# Patient Record
Sex: FEMALE | Race: WHITE | URBAN - METROPOLITAN AREA
[De-identification: names, ages, dates, MRNs, and addresses within clinical notes are randomized per-mention and may not be internally consistent; named-entity substitution may affect disease eponyms.]

---

## 2017-05-06 ENCOUNTER — INPATIENT (INPATIENT)
Facility: HOSPITAL | Age: 82
LOS: 4 days | Discharge: HOME HEALTH | End: 2017-05-11
Attending: FAMILY MEDICINE | Admitting: INTERNAL MEDICINE
Payer: MEDICARE

## 2017-05-06 VITALS — WEIGHT: 89.95 LBS

## 2017-05-06 DIAGNOSIS — J15.6 PNEUMONIA DUE TO OTHER GRAM-NEGATIVE BACTERIA: ICD-10-CM

## 2017-05-06 DIAGNOSIS — S62.102A FRACTURE OF UNSPECIFIED CARPAL BONE, LEFT WRIST, INITIAL ENCOUNTER FOR CLOSED FRACTURE: Chronic | ICD-10-CM

## 2017-05-06 DIAGNOSIS — S72.91XA UNSPECIFIED FRACTURE OF RIGHT FEMUR, INITIAL ENCOUNTER FOR CLOSED FRACTURE: Chronic | ICD-10-CM

## 2017-05-06 LAB
ALBUMIN SERPL ELPH-MCNC: 3.1 G/DL — LOW (ref 3.3–5)
ALP SERPL-CCNC: 135 U/L — HIGH (ref 40–120)
ALT FLD-CCNC: 34 U/L — SIGNIFICANT CHANGE UP (ref 12–78)
ANION GAP SERPL CALC-SCNC: 14 MMOL/L — SIGNIFICANT CHANGE UP (ref 5–17)
AST SERPL-CCNC: 23 U/L — SIGNIFICANT CHANGE UP (ref 15–37)
BASOPHILS # BLD AUTO: SIGNIFICANT CHANGE UP K/UL (ref 0–0.2)
BILIRUB SERPL-MCNC: 0.6 MG/DL — SIGNIFICANT CHANGE UP (ref 0.2–1.2)
BUN SERPL-MCNC: 16 MG/DL — SIGNIFICANT CHANGE UP (ref 7–23)
CALCIUM SERPL-MCNC: 10.2 MG/DL — HIGH (ref 8.5–10.1)
CHLORIDE SERPL-SCNC: 102 MMOL/L — SIGNIFICANT CHANGE UP (ref 96–108)
CO2 SERPL-SCNC: 27 MMOL/L — SIGNIFICANT CHANGE UP (ref 22–31)
CREAT SERPL-MCNC: 0.5 MG/DL — SIGNIFICANT CHANGE UP (ref 0.5–1.3)
EOSINOPHIL # BLD AUTO: SIGNIFICANT CHANGE UP K/UL (ref 0–0.5)
GLUCOSE SERPL-MCNC: 111 MG/DL — HIGH (ref 70–99)
HCT VFR BLD CALC: 45.5 % — HIGH (ref 34.5–45)
HGB BLD-MCNC: 14.8 G/DL — SIGNIFICANT CHANGE UP (ref 11.5–15.5)
LACTATE SERPL-SCNC: 1.5 MMOL/L — SIGNIFICANT CHANGE UP (ref 0.7–2)
LYMPHOCYTES # BLD AUTO: 11 % — LOW (ref 13–44)
LYMPHOCYTES # BLD AUTO: SIGNIFICANT CHANGE UP K/UL (ref 1–3.3)
MANUAL DIF COMMENT BLD-IMP: SIGNIFICANT CHANGE UP
MCHC RBC-ENTMCNC: 29 PG — SIGNIFICANT CHANGE UP (ref 27–34)
MCHC RBC-ENTMCNC: 32.5 GM/DL — SIGNIFICANT CHANGE UP (ref 32–36)
MCV RBC AUTO: 89.3 FL — SIGNIFICANT CHANGE UP (ref 80–100)
MONOCYTES # BLD AUTO: HIGH K/UL (ref 0–0.9)
MONOCYTES NFR BLD AUTO: 20 % — HIGH (ref 2–14)
NEUTROPHILS # BLD AUTO: HIGH K/UL (ref 1.8–7.4)
NEUTROPHILS NFR BLD AUTO: 61 % — SIGNIFICANT CHANGE UP (ref 43–77)
NEUTS BAND # BLD: 8 % — SIGNIFICANT CHANGE UP (ref 0–8)
PLAT MORPH BLD: NORMAL — SIGNIFICANT CHANGE UP
PLATELET # BLD AUTO: 366 K/UL — SIGNIFICANT CHANGE UP (ref 150–400)
POTASSIUM SERPL-MCNC: 3.4 MMOL/L — LOW (ref 3.5–5.3)
POTASSIUM SERPL-SCNC: 3.4 MMOL/L — LOW (ref 3.5–5.3)
PROT SERPL-MCNC: 7.9 GM/DL — SIGNIFICANT CHANGE UP (ref 6–8.3)
RAPID RVP RESULT: SIGNIFICANT CHANGE UP
RBC # BLD: 5.1 M/UL — SIGNIFICANT CHANGE UP (ref 3.8–5.2)
RBC # FLD: 12.5 % — SIGNIFICANT CHANGE UP (ref 10.3–14.5)
RBC BLD AUTO: NORMAL — SIGNIFICANT CHANGE UP
SODIUM SERPL-SCNC: 143 MMOL/L — SIGNIFICANT CHANGE UP (ref 135–145)
TROPONIN I SERPL-MCNC: 0.31 NG/ML — HIGH (ref 0.01–0.04)
TROPONIN I SERPL-MCNC: 0.38 NG/ML — HIGH (ref 0.01–0.04)
WBC # BLD: 10.4 K/UL — SIGNIFICANT CHANGE UP (ref 3.8–10.5)
WBC # FLD AUTO: 10.4 K/UL — SIGNIFICANT CHANGE UP (ref 3.8–10.5)

## 2017-05-06 PROCEDURE — 99285 EMERGENCY DEPT VISIT HI MDM: CPT

## 2017-05-06 PROCEDURE — 71010: CPT | Mod: 26

## 2017-05-06 RX ORDER — POTASSIUM CHLORIDE 20 MEQ
40 PACKET (EA) ORAL ONCE
Qty: 0 | Refills: 0 | Status: COMPLETED | OUTPATIENT
Start: 2017-05-06 | End: 2017-05-06

## 2017-05-06 RX ORDER — ASPIRIN/CALCIUM CARB/MAGNESIUM 324 MG
325 TABLET ORAL ONCE
Qty: 0 | Refills: 0 | Status: COMPLETED | OUTPATIENT
Start: 2017-05-06 | End: 2017-05-06

## 2017-05-06 RX ORDER — FUROSEMIDE 40 MG
40 TABLET ORAL DAILY
Qty: 0 | Refills: 0 | Status: DISCONTINUED | OUTPATIENT
Start: 2017-05-06 | End: 2017-05-07

## 2017-05-06 RX ORDER — ALBUTEROL 90 UG/1
2 AEROSOL, METERED ORAL EVERY 6 HOURS
Qty: 0 | Refills: 0 | Status: DISCONTINUED | OUTPATIENT
Start: 2017-05-06 | End: 2017-05-09

## 2017-05-06 RX ORDER — METOPROLOL TARTRATE 50 MG
12.5 TABLET ORAL EVERY 12 HOURS
Qty: 0 | Refills: 0 | Status: DISCONTINUED | OUTPATIENT
Start: 2017-05-06 | End: 2017-05-08

## 2017-05-06 RX ORDER — CEFTRIAXONE 500 MG/1
1 INJECTION, POWDER, FOR SOLUTION INTRAMUSCULAR; INTRAVENOUS ONCE
Qty: 0 | Refills: 0 | Status: COMPLETED | OUTPATIENT
Start: 2017-05-06 | End: 2017-05-06

## 2017-05-06 RX ORDER — ASPIRIN/CALCIUM CARB/MAGNESIUM 324 MG
81 TABLET ORAL DAILY
Qty: 0 | Refills: 0 | Status: DISCONTINUED | OUTPATIENT
Start: 2017-05-06 | End: 2017-05-11

## 2017-05-06 RX ORDER — FAMOTIDINE 10 MG/ML
20 INJECTION INTRAVENOUS AT BEDTIME
Qty: 0 | Refills: 0 | Status: DISCONTINUED | OUTPATIENT
Start: 2017-05-06 | End: 2017-05-11

## 2017-05-06 RX ORDER — DOCUSATE SODIUM 100 MG
100 CAPSULE ORAL THREE TIMES A DAY
Qty: 0 | Refills: 0 | Status: DISCONTINUED | OUTPATIENT
Start: 2017-05-06 | End: 2017-05-11

## 2017-05-06 RX ORDER — ONDANSETRON 8 MG/1
4 TABLET, FILM COATED ORAL EVERY 6 HOURS
Qty: 0 | Refills: 0 | Status: DISCONTINUED | OUTPATIENT
Start: 2017-05-06 | End: 2017-05-11

## 2017-05-06 RX ORDER — ZOLPIDEM TARTRATE 10 MG/1
5 TABLET ORAL AT BEDTIME
Qty: 0 | Refills: 0 | Status: DISCONTINUED | OUTPATIENT
Start: 2017-05-06 | End: 2017-05-11

## 2017-05-06 RX ORDER — ALBUTEROL 90 UG/1
2 AEROSOL, METERED ORAL
Qty: 0 | Refills: 0 | COMMUNITY

## 2017-05-06 RX ORDER — BUDESONIDE AND FORMOTEROL FUMARATE DIHYDRATE 160; 4.5 UG/1; UG/1
2 AEROSOL RESPIRATORY (INHALATION)
Qty: 0 | Refills: 0 | Status: DISCONTINUED | OUTPATIENT
Start: 2017-05-06 | End: 2017-05-11

## 2017-05-06 RX ORDER — ENOXAPARIN SODIUM 100 MG/ML
40 INJECTION SUBCUTANEOUS EVERY 24 HOURS
Qty: 0 | Refills: 0 | Status: DISCONTINUED | OUTPATIENT
Start: 2017-05-06 | End: 2017-05-11

## 2017-05-06 RX ORDER — SODIUM CHLORIDE 9 MG/ML
1500 INJECTION INTRAMUSCULAR; INTRAVENOUS; SUBCUTANEOUS ONCE
Qty: 0 | Refills: 0 | Status: COMPLETED | OUTPATIENT
Start: 2017-05-06 | End: 2017-05-06

## 2017-05-06 RX ORDER — NITROGLYCERIN 6.5 MG
0.5 CAPSULE, EXTENDED RELEASE ORAL ONCE
Qty: 0 | Refills: 0 | Status: COMPLETED | OUTPATIENT
Start: 2017-05-06 | End: 2017-05-06

## 2017-05-06 RX ORDER — ALBUTEROL 90 UG/1
2.5 AEROSOL, METERED ORAL ONCE
Qty: 0 | Refills: 0 | Status: COMPLETED | OUTPATIENT
Start: 2017-05-06 | End: 2017-05-06

## 2017-05-06 RX ADMIN — Medication 40 MILLIGRAM(S): at 13:46

## 2017-05-06 RX ADMIN — Medication 12.5 MILLIGRAM(S): at 18:44

## 2017-05-06 RX ADMIN — Medication 325 MILLIGRAM(S): at 13:34

## 2017-05-06 RX ADMIN — ENOXAPARIN SODIUM 40 MILLIGRAM(S): 100 INJECTION SUBCUTANEOUS at 16:28

## 2017-05-06 RX ADMIN — BUDESONIDE AND FORMOTEROL FUMARATE DIHYDRATE 2 PUFF(S): 160; 4.5 AEROSOL RESPIRATORY (INHALATION) at 20:48

## 2017-05-06 RX ADMIN — Medication 100 MILLIGRAM(S): at 21:24

## 2017-05-06 RX ADMIN — SODIUM CHLORIDE 1500 MILLILITER(S): 9 INJECTION INTRAMUSCULAR; INTRAVENOUS; SUBCUTANEOUS at 12:38

## 2017-05-06 RX ADMIN — FAMOTIDINE 20 MILLIGRAM(S): 10 INJECTION INTRAVENOUS at 21:24

## 2017-05-06 RX ADMIN — Medication 0.5 INCH(S): at 14:24

## 2017-05-06 RX ADMIN — CEFTRIAXONE 100 GRAM(S): 500 INJECTION, POWDER, FOR SOLUTION INTRAMUSCULAR; INTRAVENOUS at 13:07

## 2017-05-06 RX ADMIN — ALBUTEROL 2.5 MILLIGRAM(S): 90 AEROSOL, METERED ORAL at 12:58

## 2017-05-06 RX ADMIN — Medication 40 MILLIEQUIVALENT(S): at 16:01

## 2017-05-06 RX ADMIN — ZOLPIDEM TARTRATE 5 MILLIGRAM(S): 10 TABLET ORAL at 22:38

## 2017-05-06 NOTE — H&P ADULT - ASSESSMENT
83 year old woman with PMHx of asthma who presents with worsening dyspnea on exertion    SOB/MARTIN secondary to probable acute CHF exacerbation with troponemia/NSTEMI  -r/o ACS, trend troponins.  Denies chest pain or discomfort.  -xray --> mild congestion. Await official read  -tele shows sinus tach w/ frequent PVCs  -elevated BNP  -nitro patch in place  -start metoprolol  -lipid panel  -aspirin  -hold off on heparin gtt at this time  -lasix therapy  -f/u echo  -cardio consult for further evaluation    Asthma: No wheezing, appears stable.  -RVP negative  -dulera  -fluticasone  -nebs    Chronic  back pain:  -fentanyl 50mcg patch    GERD:  -ranitidine 150mg    Dvt ppx:  -lovenox

## 2017-05-06 NOTE — H&P ADULT - HISTORY OF PRESENT ILLNESS
83 year old woman with PMHx of asthma, chronic back pain, who presents with worsening dyspnea on exertion for past 5 days or so.  Breathing worse with ambulation.  SOB associated with generalized weakness.  Per son she is normally very active but has been lacking energy last several days.  Pt recently treated for URI with zithromax and prednisone.     in ER: Given lasix, albuterol, aspirin 325mg and ceftriaxone for possible CHF exacerbation.

## 2017-05-06 NOTE — ED PROVIDER NOTE - NS ED MD SCRIBE ATTENDING SCRIBE SECTIONS
REVIEW OF SYSTEMS/RESULTS/PAST MEDICAL/SURGICAL/SOCIAL HISTORY/DISPOSITION/PROGRESS NOTE/PHYSICAL EXAM/HISTORY OF PRESENT ILLNESS

## 2017-05-06 NOTE — ED PROVIDER NOTE - CARE PLAN
Principal Discharge DX:	Acute congestive heart failure, unspecified congestive heart failure type  Secondary Diagnosis:	Troponin I above reference range

## 2017-05-06 NOTE — H&P ADULT - NSHPPHYSICALEXAM_GEN_ALL_CORE
PHYSICAL EXAM:  Vital Signs Last 24 Hrs  T(C): 36.8, Max: 37.6 (05-06 @ 12:49)  T(F): 98.2, Max: 99.7 (05-06 @ 12:49)  HR: 109 (101 - 120)  BP: 131/93 (116/76 - 153/86)  BP(mean): --  RR: 21 (18 - 22)  SpO2: 99% (92% - 100%)    Constitutional: NAD, awake and alert, well-developed  HEENT: PERR, EOMI, Normal Hearing, MMM  Neck: Soft and supple  Respiratory: Breath sounds are decreased but clear bilaterally, No wheezing, rales or rhonchi  Cardiovascular: S1 and S2, regular rate and rhythm, no Murmurs, gallops or rubs  Gastrointestinal: Bowel Sounds present, soft, nontender, nondistended, no guarding, no rebound  Extremities: No peripheral edema  Neurological: A/O x 3, no focal deficits  Skin: No rashes

## 2017-05-06 NOTE — ED ADULT NURSE REASSESSMENT NOTE - NS ED NURSE REASSESS COMMENT FT1
pt urinated moderate amount in bedpan, unable to measure before discarded by techs.
pt reports her breathing has improved. resp even, labored at times, pt able to talk in complete sentences with no difficulty. assisted with using bedpan multiple times to void s/p lasix iv. pt assessed by hospitalist at bedside, awaiting inpatient orders.

## 2017-05-06 NOTE — ED PROVIDER NOTE - CARDIAC, MLM
Normal rate, regular rhythm.  Heart sounds S1, S2.  No murmurs, rubs or gallops. Normal rate, regular rhythm.  Heart sounds S1, S2.  tachycardic

## 2017-05-06 NOTE — ED PROVIDER NOTE - OBJECTIVE STATEMENT
82 y/o female with hx of asthma, IBS presents to the ED c/o cough and SOB x 5 days. She also notes chest pressure with deep breaths.  Notes mild productive cough, unable to bring anything up. Son notes weakness. Had a URI recently and was tx with z-pack and prednisone from PMD. Pt is a former smoker. No hx of PNA, MI, CHF.  No recent hospitalizations. Currently pt has no other complaints and denies fever and abd pain. PMD=[place].

## 2017-05-06 NOTE — PATIENT PROFILE ADULT. - VISION (WITH CORRECTIVE LENSES IF THE PATIENT USUALLY WEARS THEM):
Partially impaired: cannot see medication labels or newsprint, but can see obstacles in path, and the surrounding layout; can count fingers at arm's length/reading glasses/ glaucoma

## 2017-05-06 NOTE — ED STATDOCS - NS ED MD SCRIBE ATTENDING SCRIBE SECTIONS
REVIEW OF SYSTEMS/RESULTS/PROGRESS NOTE/PHYSICAL EXAM/HISTORY OF PRESENT ILLNESS/PAST MEDICAL/SURGICAL/SOCIAL HISTORY/VITAL SIGNS( Pullset)/DISPOSITION

## 2017-05-06 NOTE — H&P ADULT - NSHPLABSRESULTS_GEN_ALL_CORE
CARDIAC MARKERS ( 06 May 2017 12:42 )  0.415 ng/mL / x     / x     / x     / x                                14.8   10.4  )-----------( 366      ( 06 May 2017 12:42 )             45.5     05-06    143  |  102  |  16  ----------------------------<  111<H>  3.4<L>   |  27  |  0.50    Ca    10.2<H>      06 May 2017 12:42    TPro  7.9  /  Alb  3.1<L>  /  TBili  0.6  /  DBili  x   /  AST  23  /  ALT  34  /  AlkPhos  135<H>  05-06    CAPILLARY BLOOD GLUCOSE    LIVER FUNCTIONS - ( 06 May 2017 12:42 )  Alb: 3.1 g/dL / Pro: 7.9 gm/dL / ALK PHOS: 135 U/L / ALT: 34 U/L / AST: 23 U/L / GGT: x

## 2017-05-06 NOTE — ED ADULT NURSE NOTE - OBJECTIVE STATEMENT
pt states trouble breathing, worsening since last monday, trouble ambulating now due to breathing difficulty.

## 2017-05-06 NOTE — ED STATDOCS - OBJECTIVE STATEMENT
84 y/o female with PMHx of asthma, IBS presents to the ED c/o cough starting on Sunday. +difficulty breathing and chest pressure. Denies fevers. Notes mild productive cough, unable to bring anything up. Son notes weakness. Had a URI recently and was tx with z-pack, prednisone. No hx of PNA, MI, CHF. No recent hospitalizations.

## 2017-05-06 NOTE — H&P ADULT - PSH
Bakers cyst     delivery delivered  x 2  Closed fracture of right femur, unspecified fracture morphology, unspecified portion of femur, initial encounter    Fracture of left wrist, closed, initial encounter  s/p repair x 2  History of back surgery    S/P cataract surgery  left

## 2017-05-06 NOTE — ED PROVIDER NOTE - DETAILS:
The history, relevant review of systems, past medical and surgical history, medical decision making, and physical examination was documented by the scribe in my presence and I attest to the accuracy of the documentation (Leticia Macias MD).

## 2017-05-06 NOTE — ED PROVIDER NOTE - PROGRESS NOTE DETAILS
Suad Butterfield: Given pt's BMP and CXR, pt probably with CHF, will stop fluids at this time. Attending jelly Macias/ivy Ventura for admission.

## 2017-05-06 NOTE — ED PROVIDER NOTE - CONSTITUTIONAL, MLM
normal... Well appearing, well nourished, awake, alert, oriented to person, place, time/situation and in no apparent distress. awake, alert, oriented to person, place, time/situation and in mild distress due to SOB.

## 2017-05-07 DIAGNOSIS — R06.02 SHORTNESS OF BREATH: ICD-10-CM

## 2017-05-07 DIAGNOSIS — I50.9 HEART FAILURE, UNSPECIFIED: ICD-10-CM

## 2017-05-07 DIAGNOSIS — R74.8 ABNORMAL LEVELS OF OTHER SERUM ENZYMES: ICD-10-CM

## 2017-05-07 DIAGNOSIS — J45.909 UNSPECIFIED ASTHMA, UNCOMPLICATED: ICD-10-CM

## 2017-05-07 LAB
ADD ON TEST-SPECIMEN IN LAB: SIGNIFICANT CHANGE UP
ALBUMIN SERPL ELPH-MCNC: 2.6 G/DL — LOW (ref 3.3–5)
ALP SERPL-CCNC: 107 U/L — SIGNIFICANT CHANGE UP (ref 40–120)
ALT FLD-CCNC: 29 U/L — SIGNIFICANT CHANGE UP (ref 12–78)
ANION GAP SERPL CALC-SCNC: 9 MMOL/L — SIGNIFICANT CHANGE UP (ref 5–17)
APPEARANCE UR: CLEAR — SIGNIFICANT CHANGE UP
AST SERPL-CCNC: 20 U/L — SIGNIFICANT CHANGE UP (ref 15–37)
BACTERIA # UR AUTO: (no result)
BILIRUB SERPL-MCNC: 0.4 MG/DL — SIGNIFICANT CHANGE UP (ref 0.2–1.2)
BILIRUB UR-MCNC: NEGATIVE — SIGNIFICANT CHANGE UP
BUN SERPL-MCNC: 20 MG/DL — SIGNIFICANT CHANGE UP (ref 7–23)
CALCIUM SERPL-MCNC: 9.4 MG/DL — SIGNIFICANT CHANGE UP (ref 8.5–10.1)
CHLORIDE SERPL-SCNC: 106 MMOL/L — SIGNIFICANT CHANGE UP (ref 96–108)
CHOLEST SERPL-MCNC: 165 MG/DL — SIGNIFICANT CHANGE UP (ref 10–199)
CO2 SERPL-SCNC: 31 MMOL/L — SIGNIFICANT CHANGE UP (ref 22–31)
COLOR SPEC: YELLOW — SIGNIFICANT CHANGE UP
CREAT SERPL-MCNC: 0.5 MG/DL — SIGNIFICANT CHANGE UP (ref 0.5–1.3)
DIFF PNL FLD: (no result)
EPI CELLS # UR: SIGNIFICANT CHANGE UP
GLUCOSE SERPL-MCNC: 84 MG/DL — SIGNIFICANT CHANGE UP (ref 70–99)
GLUCOSE UR QL: NEGATIVE MG/DL — SIGNIFICANT CHANGE UP
GRAN CASTS # UR COMP ASSIST: (no result) /LPF
HBA1C BLD-MCNC: 5.6 % — SIGNIFICANT CHANGE UP (ref 4–5.6)
HCT VFR BLD CALC: 38.3 % — SIGNIFICANT CHANGE UP (ref 34.5–45)
HDLC SERPL-MCNC: 57 MG/DL — SIGNIFICANT CHANGE UP (ref 40–125)
HGB BLD-MCNC: 12.9 G/DL — SIGNIFICANT CHANGE UP (ref 11.5–15.5)
HYALINE CASTS # UR AUTO: (no result) /LPF
KETONES UR-MCNC: (no result)
LEUKOCYTE ESTERASE UR-ACNC: NEGATIVE — SIGNIFICANT CHANGE UP
LIPID PNL WITH DIRECT LDL SERPL: 91 MG/DL — SIGNIFICANT CHANGE UP
MAGNESIUM SERPL-MCNC: 2.1 MG/DL — SIGNIFICANT CHANGE UP (ref 1.8–2.4)
MCHC RBC-ENTMCNC: 30.2 PG — SIGNIFICANT CHANGE UP (ref 27–34)
MCHC RBC-ENTMCNC: 33.6 GM/DL — SIGNIFICANT CHANGE UP (ref 32–36)
MCV RBC AUTO: 90.1 FL — SIGNIFICANT CHANGE UP (ref 80–100)
NITRITE UR-MCNC: NEGATIVE — SIGNIFICANT CHANGE UP
NT-PROBNP SERPL-SCNC: 2190 PG/ML — HIGH (ref 0–450)
PH UR: 5 — SIGNIFICANT CHANGE UP (ref 5–8)
PHOSPHATE SERPL-MCNC: 3.1 MG/DL — SIGNIFICANT CHANGE UP (ref 2.5–4.5)
PLATELET # BLD AUTO: 308 K/UL — SIGNIFICANT CHANGE UP (ref 150–400)
POTASSIUM SERPL-MCNC: 3.3 MMOL/L — LOW (ref 3.5–5.3)
POTASSIUM SERPL-SCNC: 3.3 MMOL/L — LOW (ref 3.5–5.3)
PROT SERPL-MCNC: 6.7 GM/DL — SIGNIFICANT CHANGE UP (ref 6–8.3)
PROT UR-MCNC: 30 MG/DL
RBC # BLD: 4.26 M/UL — SIGNIFICANT CHANGE UP (ref 3.8–5.2)
RBC # FLD: 12.4 % — SIGNIFICANT CHANGE UP (ref 10.3–14.5)
RBC CASTS # UR COMP ASSIST: (no result) /HPF (ref 0–4)
SODIUM SERPL-SCNC: 146 MMOL/L — HIGH (ref 135–145)
SP GR SPEC: 1.01 — SIGNIFICANT CHANGE UP (ref 1.01–1.02)
T4 AB SER-ACNC: 7.8 UG/DL — SIGNIFICANT CHANGE UP (ref 4.6–12)
T4 FREE SERPL-MCNC: 1.42 NG/DL — SIGNIFICANT CHANGE UP (ref 0.76–1.46)
TOTAL CHOLESTEROL/HDL RATIO MEASUREMENT: 3 RATIO — LOW (ref 3.3–7.1)
TRIGL SERPL-MCNC: 87 MG/DL — SIGNIFICANT CHANGE UP (ref 10–149)
TSH SERPL-MCNC: 0.24 UIU/ML — LOW (ref 0.36–3.74)
UROBILINOGEN FLD QL: NEGATIVE MG/DL — SIGNIFICANT CHANGE UP
WBC # BLD: 9.5 K/UL — SIGNIFICANT CHANGE UP (ref 3.8–10.5)
WBC # FLD AUTO: 9.5 K/UL — SIGNIFICANT CHANGE UP (ref 3.8–10.5)
WBC UR QL: SIGNIFICANT CHANGE UP

## 2017-05-07 PROCEDURE — 93010 ELECTROCARDIOGRAM REPORT: CPT

## 2017-05-07 PROCEDURE — 99223 1ST HOSP IP/OBS HIGH 75: CPT

## 2017-05-07 RX ORDER — CLOPIDOGREL BISULFATE 75 MG/1
75 TABLET, FILM COATED ORAL DAILY
Qty: 0 | Refills: 0 | Status: DISCONTINUED | OUTPATIENT
Start: 2017-05-07 | End: 2017-05-09

## 2017-05-07 RX ORDER — FUROSEMIDE 40 MG
20 TABLET ORAL DAILY
Qty: 0 | Refills: 0 | Status: DISCONTINUED | OUTPATIENT
Start: 2017-05-07 | End: 2017-05-07

## 2017-05-07 RX ORDER — POTASSIUM CHLORIDE 20 MEQ
40 PACKET (EA) ORAL ONCE
Qty: 0 | Refills: 0 | Status: COMPLETED | OUTPATIENT
Start: 2017-05-07 | End: 2017-05-07

## 2017-05-07 RX ORDER — FENTANYL CITRATE 50 UG/ML
1 INJECTION INTRAVENOUS
Qty: 0 | Refills: 0 | Status: DISCONTINUED | OUTPATIENT
Start: 2017-05-07 | End: 2017-05-11

## 2017-05-07 RX ADMIN — Medication 100 MILLIGRAM(S): at 05:37

## 2017-05-07 RX ADMIN — Medication 81 MILLIGRAM(S): at 09:32

## 2017-05-07 RX ADMIN — Medication 12.5 MILLIGRAM(S): at 16:21

## 2017-05-07 RX ADMIN — FAMOTIDINE 20 MILLIGRAM(S): 10 INJECTION INTRAVENOUS at 21:12

## 2017-05-07 RX ADMIN — Medication 12.5 MILLIGRAM(S): at 05:38

## 2017-05-07 RX ADMIN — Medication 40 MILLIGRAM(S): at 05:39

## 2017-05-07 RX ADMIN — BUDESONIDE AND FORMOTEROL FUMARATE DIHYDRATE 2 PUFF(S): 160; 4.5 AEROSOL RESPIRATORY (INHALATION) at 10:11

## 2017-05-07 RX ADMIN — Medication 40 MILLIEQUIVALENT(S): at 09:32

## 2017-05-07 RX ADMIN — Medication 100 MILLIGRAM(S): at 21:11

## 2017-05-07 RX ADMIN — BUDESONIDE AND FORMOTEROL FUMARATE DIHYDRATE 2 PUFF(S): 160; 4.5 AEROSOL RESPIRATORY (INHALATION) at 20:33

## 2017-05-07 RX ADMIN — ENOXAPARIN SODIUM 40 MILLIGRAM(S): 100 INJECTION SUBCUTANEOUS at 16:21

## 2017-05-07 RX ADMIN — FENTANYL CITRATE 1 PATCH: 50 INJECTION INTRAVENOUS at 11:19

## 2017-05-07 RX ADMIN — Medication 0.5 INCH(S): at 05:49

## 2017-05-07 RX ADMIN — CLOPIDOGREL BISULFATE 75 MILLIGRAM(S): 75 TABLET, FILM COATED ORAL at 11:19

## 2017-05-07 RX ADMIN — ZOLPIDEM TARTRATE 5 MILLIGRAM(S): 10 TABLET ORAL at 21:10

## 2017-05-07 NOTE — CONSULT NOTE ADULT - SUBJECTIVE AND OBJECTIVE BOX
C    HPI:  83 year old woman with PMHx of asthma, chronic back pain, who presents with worsening dyspnea on exertion for past one week   Patient was feeling weak for few days with subsequent cough with  scanty productive cough , with chest heavyness , increase with activity , occasional wheezing , patient did receive bronchodilator treatment , with antibiotics , however she continue feel weak , decided to come to ER   . Pt recently treated for URI with zithromax and prednisone.     in ER: Given lasix, albuterol, aspirin 325mg and ceftriaxone for possible  pneumonia  (06 May 2017 15:11)  Patient blood work showed mild elevated troponin  with new EKg changes     patient says she had  negative cardiac work up 2 years       PAST MEDICAL & SURGICAL HISTORY:  IBS (irritable bowel syndrome)  Chronic back pain  Asthma  Fracture of left wrist, closed, initial encounter: s/p repair x 2  Closed fracture of right femur, unspecified fracture morphology, unspecified portion of femur, initial encounter   delivery delivered: x 2  Bakers cyst  S/P cataract surgery: left  History of back surgery      Allergies    &quot;almost &quot; from MRI dye (Other)  No Known Drug Allergies    Intolerances    oxycodone (Nausea)      SOCIAL HISTORY: never smoker     FAMILY HISTORY:  No pertinent family history in first degree relatives      MEDICATIONS:  MEDICATIONS  (STANDING):  furosemide   Injectable 40milliGRAM(s) IV Push daily  buDESOnide 160 MICROgram(s)/formoterol 4.5 MICROgram(s) Inhaler 2Puff(s) Inhalation two times a day  enoxaparin Injectable 40milliGRAM(s) SubCutaneous every 24 hours  metoprolol 12.5milliGRAM(s) Oral every 12 hours  docusate sodium 100milliGRAM(s) Oral three times a day  aspirin  chewable 81milliGRAM(s) Oral daily  famotidine    Tablet 20milliGRAM(s) Oral at bedtime  potassium chloride    Tablet ER 40milliEquivalent(s) Oral once    MEDICATIONS  (PRN):  ondansetron Injectable 4milliGRAM(s) IV Push every 6 hours PRN Nausea  zolpidem 5milliGRAM(s) Oral at bedtime PRN Insomnia  ALBUTerol    90 MICROgram(s) HFA Inhaler 2Puff(s) Inhalation every 6 hours PRN Bronchospasm      REVIEW OF SYSTEMS:     no fever   All other review of systems is negative unless indicated above    Vital Signs Last 24 Hrs  T(C): 36.4, Max: 37.6 ( @ 12:49)  T(F): 97.6, Max: 99.7 ( @ 12:49)  HR: 87 (72 - 120)  BP: 120/70 (102/76 - 153/86)  BP(mean): --  RR: 17 (17 - 22)  SpO2: 98% (92% - 100%)    I&O's Summary    I & Os for current day (as of 07 May 2017 09:24)  =============================================  IN: 1200 ml / OUT: 575 ml / NET: 625 ml      PHYSICAL EXAM:    Constitutional: NAD, awake and alert, thin built   HEENT: PERR, EOMI,  No oral cyananosis.  Neck:  supple,  No JVD  Respiratory: Breath sounds are clear bilaterally,scattered  rhonchi  Cardiovascular: S1 and S2, regular rate and rhythm,with ectopic beats  Gastrointestinal: Bowel Sounds present, soft, nontender.   Extremities: No peripheral edema. No clubbing or cyanosis.  Vascular: 2+ peripheral pulses  Neurological: A/O x 3, no focal deficits  Musculoskeletal: no calf tenderness.  Skin: No rashes.      LABS: All Labs Reviewed:                        12.9   9.5   )-----------( 308      ( 07 May 2017 05:28 )             38.3                         14.8   10.4  )-----------( 366      ( 06 May 2017 12:42 )             45.5     07 May 2017 05:28    146    |  106    |  20     ----------------------------<  84     3.3     |  31     |  0.50   06 May 2017 12:42    143    |  102    |  16     ----------------------------<  111    3.4     |  27     |  0.50     Ca    9.4        07 May 2017 05:28  Ca    10.2       06 May 2017 12:42  Phos  3.1       07 May 2017 05:28  Mg     2.1       07 May 2017 05:28    TPro  6.7    /  Alb  2.6    /  TBili  0.4    /  DBili  x      /  AST  20     /  ALT  29     /  AlkPhos  107    07 May 2017 05:28  TPro  7.9    /  Alb  3.1    /  TBili  0.6    /  DBili  x      /  AST  23     /  ALT  34     /  AlkPhos  135    06 May 2017 12:42      CARDIAC MARKERS ( 06 May 2017 19:42 )  0.312 ng/mL / x     / x     / x     / x      CARDIAC MARKERS ( 06 May 2017 16:25 )  0.384 ng/mL / x     / x     / x     / x      CARDIAC MARKERS ( 06 May 2017 12:42 )  0.415 ng/mL / x     / x     / x     / x          Blood Culture:    @ 05:28  Pro Bnp 2190   @ 12:42  Pro Bnp 1377     @ 05:28  TSH: 0.243      RADIOLOGY/EK2017  Sinus tachycardia  frequent PVC  left axis  ICRBBB,  2017  Sinus rhythm  New T wave inversion inferior leads , anterior leads ,

## 2017-05-07 NOTE — CONSULT NOTE ADULT - PROBLEM SELECTOR RECOMMENDATION 4
elevated troponin with eg changes ,  will continue ecotrin , will give plavix ,  recommend cardiac cath , will evaluate after echo

## 2017-05-07 NOTE — DIETITIAN INITIAL EVALUATION ADULT. - NS AS NUTRI DX NUTRIENT
Patient meets criteria for severe protein energy malnutrition due to <75% intake of nutritional needs >7 days, and NFPE reveals severe clavicle, interosseous, biceps, quadriceps muscle wasting and severe orbital  fat loss, visible squaring of shoulders and protruding acromion process/Malnutrition

## 2017-05-07 NOTE — CONSULT NOTE ADULT - PROBLEM SELECTOR RECOMMENDATION 3
possible ACS as patient elevated troponin with New EKG changes   patient might need coronary angiogram.  left message with son

## 2017-05-07 NOTE — DIETITIAN INITIAL EVALUATION ADULT. - PHYSICAL APPEARANCE
NFPE reveals severe clavicle, interosseous, biceps, quadriceps muscle wasting and severe orbital  fat loss, visible squaring of shoulders and protruding acromion process./underweight

## 2017-05-07 NOTE — PHYSICAL THERAPY INITIAL EVALUATION ADULT - DIAGNOSIS, PT EVAL
SOB/MARTIN secondary to probable acute CHF exacerbation with troponemia/NSTEMI vs less likely asthma exacerbation

## 2017-05-07 NOTE — CHART NOTE - NSCHARTNOTEFT_GEN_A_CORE
Upon Nutritional Assessment by the Registered Dietitian your patient was determined to meet criteria has evidence of the following diagnosis/diagnoses:        [ ]  Mild Protein Calorie Malnutrition        [ ]  Moderate Protein Calorie Malnutrition        [X] Severe Protein Calorie Malnutrition        [ ] Unspecified Protein Calorie Malnutrition    Findings as based on:  •  Comprehensive nutrition assessment and Nutrition focused physical examination  •  Insufficient food/energy intake  •  Weight loss over time(Please specify time period)  •  Loss of muscle mass  •  Loss of fat mass  •  Fluid accumulation    Findings relevant to patient:  1.<75% intake of nutritional needs >7 days,   2.  severe clavicle, interosseous, biceps, quadriceps muscle wasting   3 severe orbital  fat loss, visible squaring of shoulders and protruding acromion process    Nutrition Interventions:  1. add 4 oz ensure enlive TID  2. ensure pudding TID  3.    TO BE COMPLETED BY PROVIDER:          [ ] Agree:         [ ] Disagree:    Comments:

## 2017-05-07 NOTE — PROGRESS NOTE ADULT - SUBJECTIVE AND OBJECTIVE BOX
Patient is a 83y old  Female who presents with a chief complaint of Difficulty breathing. (06 May 2017 15:11)      HPI:  83 year old woman with PMHx of asthma, chronic back pain, who presents with worsening dyspnea on exertion for past 5 days or so.  Breathing worse with ambulation.  SOB associated with generalized weakness.  Per son she is normally very active but has been lacking energy last several days.  Pt recently treated for URI with zithromax and prednisone.     in ER: Given lasix, albuterol, aspirin 325mg and ceftriaxone for possible CHF exacerbation. (06 May 2017 15:11).    : Seen at bedside, still winded on ambulation.  Comfortable at rest.  Denies fever, chills, N, V, abd pain, CP.      Review of system- Rest of the review of system are normal except mentioned in HPI      Vital Signs Last 24 Hrs  T(C): 36.3, Max: 37.6 (05-06 @ 12:49)  T(F): 97.3, Max: 99.7 (05-06 @ 12:49)  HR: 105 (72 - 113)  BP: 103/61 (102/76 - 153/86)  BP(mean): --  RR: 16 (16 - 22)  SpO2: 93% (93% - 100%)    PHYSICAL EXAM:    Constitutional: NAD, awake and alert, well-developed  HEENT: PERR, EOMI, Normal Hearing, MMM  Neck: Soft and supple  Respiratory: Breath sounds are clear bilaterally, No wheezing, rales or rhonchi  Cardiovascular: S1 and S2, regular rate and rhythm, no Murmurs, gallops or rubs  Gastrointestinal: Bowel Sounds present, soft, nontender, nondistended, no guarding, no rebound  Extremities: No peripheral edema  Neurological: A/O x 3, no focal deficits  Skin: No rashes            Allergies    &quot;almost &quot; from MRI dye (Other)  No Known Drug Allergies    Intolerances    oxycodone (Nausea)    MEDICATIONS  (STANDING):  buDESOnide 160 MICROgram(s)/formoterol 4.5 MICROgram(s) Inhaler 2Puff(s) Inhalation two times a day  enoxaparin Injectable 40milliGRAM(s) SubCutaneous every 24 hours  metoprolol 12.5milliGRAM(s) Oral every 12 hours  docusate sodium 100milliGRAM(s) Oral three times a day  aspirin  chewable 81milliGRAM(s) Oral daily  famotidine    Tablet 20milliGRAM(s) Oral at bedtime  furosemide   Injectable 20milliGRAM(s) IV Push daily  clopidogrel Tablet 75milliGRAM(s) Oral daily  fentaNYL   Patch  50 MICROgram(s)/Hr. 1Patch Transdermal every 48 hours    MEDICATIONS  (PRN):  ondansetron Injectable 4milliGRAM(s) IV Push every 6 hours PRN Nausea  zolpidem 5milliGRAM(s) Oral at bedtime PRN Insomnia  ALBUTerol    90 MICROgram(s) HFA Inhaler 2Puff(s) Inhalation every 6 hours PRN Bronchospasm        CARDIAC MARKERS ( 06 May 2017 19:42 )  0.312 ng/mL / x     / x     / x     / x      CARDIAC MARKERS ( 06 May 2017 16:25 )  0.384 ng/mL / x     / x     / x     / x      CARDIAC MARKERS ( 06 May 2017 12:42 )  0.415 ng/mL / x     / x     / x     / x                                12.9   9.5   )-----------( 308      ( 07 May 2017 05:28 )             38.3     05-07    146<H>  |  106  |  20  ----------------------------<  84  3.3<L>   |  31  |  0.50    Ca    9.4      07 May 2017 05:28  Phos  3.1     05-07  Mg     2.1     05-07    TPro  6.7  /  Alb  2.6<L>  /  TBili  0.4  /  DBili  x   /  AST  20  /  ALT  29  /  AlkPhos  107  05-07    CAPILLARY BLOOD GLUCOSE    LIVER FUNCTIONS - ( 07 May 2017 05:28 )  Alb: 2.6 g/dL / Pro: 6.7 gm/dL / ALK PHOS: 107 U/L / ALT: 29 U/L / AST: 20 U/L / GGT: x             Assessment and Plan:   Assessment:  · Assessment		  83 year old woman with PMHx of asthma who presents with worsening dyspnea on exertion.    SOB/MARTIN secondary to probable acute CHF exacerbation with troponemia/NSTEMI vs less likely asthma exacerbation  -monitor on tele. Sinus tach, PVCs.  EKG changes noted by cardiology.  -Troponins trending down.  Denies chest pain or discomfort.  -xray --> mild congestion. Await official read  -elevated BNP  -s/p nitro patch in ER  -c/w metoprolol - started on this admission.  Tolerating well.  BP improved.  -f/u lipid panel  -a1c 5.6  -aspirin and plavix initiated  -c/w daily lasix therapy  -f/u echo  -cardio consult for further evaluation - may need cardiac catheterization.    Asthma: No wheezing, appears stable.  -RVP negative  -dulera  -fluticasone  -nebs    hypokalemia: secondary to lasix  -replete and monitor    Chronic  back pain:  -fentanyl 50mcg patch q48hrs    GERD:  -ranitidine 150mg    Dvt ppx:  -lovenox    Attending Statement:  >35 minutes spent on total encounter; more than 50% of the visit was spent counseling and/or coordinating care by the attending physician.

## 2017-05-07 NOTE — DIETITIAN INITIAL EVALUATION ADULT. - OTHER INFO
24 Hour Recall: Breakfast: eggs, pancakes; Luch: ensure, gatorade, yogurt; Dinner: steak, potato 24 Hour Recall: Breakfast: eggs, pancakes; Lunch: ensure, gatorade, yogurt; Dinner: steak, potato

## 2017-05-07 NOTE — CONSULT NOTE ADULT - PROBLEM SELECTOR RECOMMENDATION 9
multifactorial , possible acute bronchitis with asthma , new CHF ,       ecotrin plavix  , echocardiogram to asses left ventricular function

## 2017-05-07 NOTE — PHYSICAL THERAPY INITIAL EVALUATION ADULT - PERTINENT HX OF CURRENT PROBLEM, REHAB EVAL
82 yo F admitted  with c/o worsening dyspnea on exertion for past 5 days or so.  Breathing worse with ambulation.  SOB associated with generalized weakness.

## 2017-05-08 LAB
ANION GAP SERPL CALC-SCNC: 8 MMOL/L — SIGNIFICANT CHANGE UP (ref 5–17)
BUN SERPL-MCNC: 29 MG/DL — HIGH (ref 7–23)
CALCIUM SERPL-MCNC: 9.8 MG/DL — SIGNIFICANT CHANGE UP (ref 8.5–10.1)
CHLORIDE SERPL-SCNC: 106 MMOL/L — SIGNIFICANT CHANGE UP (ref 96–108)
CO2 SERPL-SCNC: 32 MMOL/L — HIGH (ref 22–31)
CREAT SERPL-MCNC: 0.67 MG/DL — SIGNIFICANT CHANGE UP (ref 0.5–1.3)
CULTURE RESULTS: SIGNIFICANT CHANGE UP
GLUCOSE SERPL-MCNC: 99 MG/DL — SIGNIFICANT CHANGE UP (ref 70–99)
POTASSIUM SERPL-MCNC: 4.1 MMOL/L — SIGNIFICANT CHANGE UP (ref 3.5–5.3)
POTASSIUM SERPL-SCNC: 4.1 MMOL/L — SIGNIFICANT CHANGE UP (ref 3.5–5.3)
SODIUM SERPL-SCNC: 146 MMOL/L — HIGH (ref 135–145)
SPECIMEN SOURCE: SIGNIFICANT CHANGE UP
T4 FREE SERPL-MCNC: 1.5 NG/DL — HIGH (ref 0.76–1.46)

## 2017-05-08 PROCEDURE — 71250 CT THORAX DX C-: CPT | Mod: 26

## 2017-05-08 PROCEDURE — 93306 TTE W/DOPPLER COMPLETE: CPT | Mod: 26

## 2017-05-08 PROCEDURE — 99233 SBSQ HOSP IP/OBS HIGH 50: CPT

## 2017-05-08 RX ORDER — SODIUM CHLORIDE 9 MG/ML
500 INJECTION, SOLUTION INTRAVENOUS
Qty: 0 | Refills: 0 | Status: DISCONTINUED | OUTPATIENT
Start: 2017-05-08 | End: 2017-05-10

## 2017-05-08 RX ORDER — METOPROLOL TARTRATE 50 MG
25 TABLET ORAL
Qty: 0 | Refills: 0 | Status: DISCONTINUED | OUTPATIENT
Start: 2017-05-08 | End: 2017-05-09

## 2017-05-08 RX ORDER — CEFUROXIME AXETIL 250 MG
500 TABLET ORAL EVERY 12 HOURS
Qty: 0 | Refills: 0 | Status: DISCONTINUED | OUTPATIENT
Start: 2017-05-08 | End: 2017-05-09

## 2017-05-08 RX ADMIN — Medication 100 MILLIGRAM(S): at 05:34

## 2017-05-08 RX ADMIN — Medication 40 MILLIGRAM(S): at 22:20

## 2017-05-08 RX ADMIN — Medication 100 MILLIGRAM(S): at 22:20

## 2017-05-08 RX ADMIN — ZOLPIDEM TARTRATE 5 MILLIGRAM(S): 10 TABLET ORAL at 22:20

## 2017-05-08 RX ADMIN — Medication 12.5 MILLIGRAM(S): at 05:34

## 2017-05-08 RX ADMIN — FAMOTIDINE 20 MILLIGRAM(S): 10 INJECTION INTRAVENOUS at 22:20

## 2017-05-08 RX ADMIN — Medication 81 MILLIGRAM(S): at 12:09

## 2017-05-08 RX ADMIN — Medication 600 MILLIGRAM(S): at 22:20

## 2017-05-08 RX ADMIN — ENOXAPARIN SODIUM 40 MILLIGRAM(S): 100 INJECTION SUBCUTANEOUS at 18:01

## 2017-05-08 RX ADMIN — CLOPIDOGREL BISULFATE 75 MILLIGRAM(S): 75 TABLET, FILM COATED ORAL at 12:09

## 2017-05-08 RX ADMIN — Medication 25 MILLIGRAM(S): at 18:01

## 2017-05-08 RX ADMIN — Medication 500 MILLIGRAM(S): at 22:20

## 2017-05-08 RX ADMIN — BUDESONIDE AND FORMOTEROL FUMARATE DIHYDRATE 2 PUFF(S): 160; 4.5 AEROSOL RESPIRATORY (INHALATION) at 08:19

## 2017-05-08 NOTE — PROGRESS NOTE ADULT - ASSESSMENT
Patient is seen and consult dictated     IMPRESSION     - Asthma with symptoms of exacerbation with continued cough with ppt by bronchoilitis with tree in bud pattern of lung infiltrates with small vessel air way plugging   - status post cardiac cath with recent positive troponins with non obstructive coronaries.  - History of acid reflux .  - no chf with the cxr or current ct scan of the chest     PLAN   - would recommend repeat course of low dose prednisone with po ceftin along symbicort with the use of albuterol as needed for the wheezing .  - check sat on room air and with ambulation   - continue with anti ischemic therapy as per the cardiology   - encourage ambulation   - dvt prophylaxis with Lovenox

## 2017-05-08 NOTE — PROGRESS NOTE ADULT - SUBJECTIVE AND OBJECTIVE BOX
Patient is a 83y old  Female who presents with a chief complaint of Difficulty breathing. (06 May 2017 15:11)      HPI:  83 year old woman with PMHx of asthma, chronic back pain, who presents with worsening dyspnea on exertion for past 5 days or so.  Breathing worse with ambulation.  SOB associated with generalized weakness.  Per son she is normally very active but has been lacking energy last several days.  Pt recently treated for URI with zithromax and prednisone.     in ER: Given lasix, albuterol, aspirin 325mg and ceftriaxone for possible CHF exacerbation. (06 May 2017 15:11).    : Seen at bedside, still winded on ambulation.  Comfortable at rest.  Denies fever, chills, N, V, abd pain, CP.  : Pt still with dyspnea on exertion.  No fever, chills.  Has cough but non-productive.        Review of system- Rest of the review of system are normal except mentioned in HPI    Vital Signs Last 24 Hrs  T(C): 36.2, Max: 36.8 (-08 @ 10:10)  T(F): 97.2, Max: 98.3 (05-08 @ 10:10)  HR: 75 (75 - 104)  BP: 150/71 (121/87 - 170/93)  BP(mean): --  RR: 16 (16 - 20)  SpO2: 98% (86% - 98%)    PHYSICAL EXAM:    Constitutional: NAD, awake and alert, well-developed  HEENT: PERR, EOMI, Normal Hearing, MMM  Neck: Soft and supple  Respiratory: Breath sounds are clear bilaterally, No wheezing, rales or rhonchi  Cardiovascular: S1 and S2, regular rate and rhythm, no Murmurs, gallops or rubs  Gastrointestinal: Bowel Sounds present, soft, nontender, nondistended, no guarding, no rebound  Extremities: No peripheral edema  Neurological: A/O x 3, no focal deficits  Skin: No rashes            Allergies    &quot;almost &quot; from MRI dye (Other)  No Known Drug Allergies    Intolerances    oxycodone (Nausea)    MEDICATIONS  (STANDING):  buDESOnide 160 MICROgram(s)/formoterol 4.5 MICROgram(s) Inhaler 2Puff(s) Inhalation two times a day  enoxaparin Injectable 40milliGRAM(s) SubCutaneous every 24 hours  docusate sodium 100milliGRAM(s) Oral three times a day  aspirin  chewable 81milliGRAM(s) Oral daily  famotidine    Tablet 20milliGRAM(s) Oral at bedtime  clopidogrel Tablet 75milliGRAM(s) Oral daily  fentaNYL   Patch  50 MICROgram(s)/Hr. 1Patch Transdermal every 48 hours  sodium chloride 0.45%. 500milliLiter(s) IV Continuous <Continuous>  metoprolol 25milliGRAM(s) Oral two times a day    MEDICATIONS  (PRN):  ondansetron Injectable 4milliGRAM(s) IV Push every 6 hours PRN Nausea  zolpidem 5milliGRAM(s) Oral at bedtime PRN Insomnia  ALBUTerol    90 MICROgram(s) HFA Inhaler 2Puff(s) Inhalation every 6 hours PRN Bronchospasm        CARDIAC MARKERS ( 06 May 2017 19:42 )  0.312 ng/mL / x     / x     / x     / x      CARDIAC MARKERS ( 06 May 2017 16:25 )  0.384 ng/mL / x     / x     / x     / x                                12.9   9.5   )-----------( 308      ( 07 May 2017 05:28 )             38.3     05-08    146<H>  |  106  |  29<H>  ----------------------------<  99  4.1   |  32<H>  |  0.67    Ca    9.8      08 May 2017 05:45  Phos  3.1     05-07  Mg     2.1     05-07    TPro  6.7  /  Alb  2.6<L>  /  TBili  0.4  /  DBili  x   /  AST  20  /  ALT  29  /  AlkPhos  107  05-07    CAPILLARY BLOOD GLUCOSE    LIVER FUNCTIONS - ( 07 May 2017 05:28 )  Alb: 2.6 g/dL / Pro: 6.7 gm/dL / ALK PHOS: 107 U/L / ALT: 29 U/L / AST: 20 U/L / GGT: x             Urinalysis Basic - ( 07 May 2017 15:20 )    Color: Yellow / Appearance: Clear / S.015 / pH: x  Gluc: x / Ketone: Trace  / Bili: Negative / Urobili: Negative mg/dL   Blood: x / Protein: 30 mg/dL / Nitrite: Negative   Leuk Esterase: Negative / RBC: 3-5 /HPF / WBC 3-5   Sq Epi: x / Non Sq Epi: Occasional / Bacteria: Few      Assessment and Plan:   Assessment:  · Assessment		  83 year old woman with PMHx of asthma who presents with worsening dyspnea on exertion.    SOB/MARTIN: Unclear etiology, possibly related to acute diastolic CHF vs asthma exacerbation vs PE?  -monitor on tele. Sinus tach, PVCs.  EKG changes noted by cardiology.  -Troponins trending down.  Denies chest pain or discomfort.  -cardio catheterization --> non-obstructive CAD, preserved EF.  -xray official read --> Clear lungs  -elevated BNP  -s/p nitro patch in ER and lasix therapy x 3 days.  -c/w metoprolol - started on this admission.  Tolerating well.  BP improved.  -ipid panel - acceptable  -a1c 5.6  -aspirin and plavix initiated.  d/c plavix and continue aspirin.  -f/u echo  -If still with MARTIN and no source may need to r/o PE    Hypertension: New onset, uncontrolled  -uptitrate metoprolol      Asthma: No wheezing, appears stable.  -RVP negative  -CT chest to r/o underlying pathology  -pulm consult  -dulera  -fluticasone  -nebs    hypokalemia: secondary to lasix: RESOLVED.  -replete and monitor    Chronic  back pain: STABLE  -fentanyl 50mcg patch q48hrs    GERD: STABLE  -ranitidine 150mg    Dvt ppx:  -lovenox    Attending Statement:  >35 minutes spent on total encounter; more than 50% of the visit was spent counseling and/or coordinating care by the attending physician. Patient is a 83y old  Female who presents with a chief complaint of Difficulty breathing. (06 May 2017 15:11)      HPI:  83 year old woman with PMHx of asthma, chronic back pain, who presents with worsening dyspnea on exertion for past 5 days or so.  Breathing worse with ambulation.  SOB associated with generalized weakness.  Per son she is normally very active but has been lacking energy last several days.  Pt recently treated for URI with zithromax and prednisone.     in ER: Given lasix, albuterol, aspirin 325mg and ceftriaxone for possible CHF exacerbation. (06 May 2017 15:11).    : Seen at bedside, still winded on ambulation.  Comfortable at rest.  Denies fever, chills, N, V, abd pain, CP.  : Pt still with dyspnea on exertion.  No fever, chills.  Has cough but non-productive.        Review of system- Rest of the review of system are normal except mentioned in HPI    Vital Signs Last 24 Hrs  T(C): 36.2, Max: 36.8 (-08 @ 10:10)  T(F): 97.2, Max: 98.3 (05-08 @ 10:10)  HR: 75 (75 - 104)  BP: 150/71 (121/87 - 170/93)  BP(mean): --  RR: 16 (16 - 20)  SpO2: 98% (86% - 98%)    PHYSICAL EXAM:    Constitutional: NAD, awake and alert, well-developed  HEENT: PERR, EOMI, Normal Hearing, MMM  Neck: Soft and supple  Respiratory: Breath sounds are clear bilaterally, No wheezing, rales or rhonchi  Cardiovascular: S1 and S2, regular rate and rhythm, no Murmurs, gallops or rubs  Gastrointestinal: Bowel Sounds present, soft, nontender, nondistended, no guarding, no rebound  Extremities: No peripheral edema  Neurological: A/O x 3, no focal deficits  Skin: No rashes            Allergies    &quot;almost &quot; from MRI dye (Other)  No Known Drug Allergies    Intolerances    oxycodone (Nausea)    MEDICATIONS  (STANDING):  buDESOnide 160 MICROgram(s)/formoterol 4.5 MICROgram(s) Inhaler 2Puff(s) Inhalation two times a day  enoxaparin Injectable 40milliGRAM(s) SubCutaneous every 24 hours  docusate sodium 100milliGRAM(s) Oral three times a day  aspirin  chewable 81milliGRAM(s) Oral daily  famotidine    Tablet 20milliGRAM(s) Oral at bedtime  clopidogrel Tablet 75milliGRAM(s) Oral daily  fentaNYL   Patch  50 MICROgram(s)/Hr. 1Patch Transdermal every 48 hours  sodium chloride 0.45%. 500milliLiter(s) IV Continuous <Continuous>  metoprolol 25milliGRAM(s) Oral two times a day    MEDICATIONS  (PRN):  ondansetron Injectable 4milliGRAM(s) IV Push every 6 hours PRN Nausea  zolpidem 5milliGRAM(s) Oral at bedtime PRN Insomnia  ALBUTerol    90 MICROgram(s) HFA Inhaler 2Puff(s) Inhalation every 6 hours PRN Bronchospasm        CARDIAC MARKERS ( 06 May 2017 19:42 )  0.312 ng/mL / x     / x     / x     / x      CARDIAC MARKERS ( 06 May 2017 16:25 )  0.384 ng/mL / x     / x     / x     / x                                12.9   9.5   )-----------( 308      ( 07 May 2017 05:28 )             38.3     05-08    146<H>  |  106  |  29<H>  ----------------------------<  99  4.1   |  32<H>  |  0.67    Ca    9.8      08 May 2017 05:45  Phos  3.1     05-07  Mg     2.1     05-07    TPro  6.7  /  Alb  2.6<L>  /  TBili  0.4  /  DBili  x   /  AST  20  /  ALT  29  /  AlkPhos  107  05-07    CAPILLARY BLOOD GLUCOSE    LIVER FUNCTIONS - ( 07 May 2017 05:28 )  Alb: 2.6 g/dL / Pro: 6.7 gm/dL / ALK PHOS: 107 U/L / ALT: 29 U/L / AST: 20 U/L / GGT: x             Urinalysis Basic - ( 07 May 2017 15:20 )    Color: Yellow / Appearance: Clear / S.015 / pH: x  Gluc: x / Ketone: Trace  / Bili: Negative / Urobili: Negative mg/dL   Blood: x / Protein: 30 mg/dL / Nitrite: Negative   Leuk Esterase: Negative / RBC: 3-5 /HPF / WBC 3-5   Sq Epi: x / Non Sq Epi: Occasional / Bacteria: Few      Assessment and Plan:   Assessment:  · Assessment		  83 year old woman with PMHx of asthma who presents with worsening dyspnea on exertion.    SOB/MARTIN: Unclear etiology, possibly related to acute diastolic CHF vs asthma exacerbation vs PE?  -monitor on tele. Sinus tach, PVCs.  EKG changes noted by cardiology.  -Troponins trending down.  Denies chest pain or discomfort.  -cardio catheterization --> non-obstructive CAD, preserved EF.  -xray official read --> Clear lungs  -elevated BNP  -s/p nitro patch in ER and lasix therapy x 3 days.  -c/w metoprolol - started on this admission.  Tolerating well.  BP improved.  -ipid panel - acceptable  -a1c 5.6  -aspirin and plavix initiated.  d/c plavix and continue aspirin.  -f/u echo  -If still with MARTIN and no source may need to r/o PE    Hypertension: New onset, uncontrolled  -uptitrate metoprolol      Asthma: No wheezing, appears stable.  -RVP negative  -CT chest to r/o underlying pathology  -pulm consult  -dulera  -fluticasone  -nebs    Hyperthyroidism: No history of this  -TSH borderline low with borderline high free T4  -endo eval    hypokalemia: secondary to lasix: RESOLVED.  -replete and monitor    Chronic  back pain: STABLE  -fentanyl 50mcg patch q48hrs    GERD: STABLE  -ranitidine 150mg    Dvt ppx:  -lovenox    Attending Statement:  >35 minutes spent on total encounter; more than 50% of the visit was spent counseling and/or coordinating care by the attending physician.

## 2017-05-08 NOTE — CDI QUERY NOTE - NSCDIOTHERTXTBX_GEN_ALL_CORE_HH
1) CHF exacerbation and New CHF documented.  BNP= 2190, patient on lasix 40mg IV qday. Echo done 5/8, results pending.    When known please clarify the type of CHF exacerbation...  Diastolic ? Systolic ? Other ?    2) Per nutrition consult:Malnutrition; Patient meets criteria for severe protein energy malnutrition due to <75% intake of nutritional needs >7 days, and NFPE reveals severe clavicle, interosseous, biceps, quadriceps muscle wasting and severe orbital  fat loss, visible squaring of shoulders and protruding acromion process. BMI= 16.9    Please clarify if you agree or disagree with the clinical diagnosis of Severe protein-calorie malnutrition ?

## 2017-05-08 NOTE — PROGRESS NOTE ADULT - SUBJECTIVE AND OBJECTIVE BOX
s/p LHC revealing non obstructive CAD    Patient feels well.  Denies chest pain, shortness of breath, dizziness or palpitations at this time    Right groin procedure site CDI.  no bleeding, no hematoma, site soft, non tender, positive pedal pulses bilaterally        HPI:  83 year old woman with PMHx of asthma, chronic back pain, who presents with worsening dyspnea on exertion for past 5 days or so.  Breathing worse with ambulation.  SOB associated with generalized weakness.   now s/p C revealing non obstructive CAD  continue medical management  no intervention advised at this time      -continue tele  -continue hospitalist service  -vital signs, labs, diet and activity per post procedure orders  -ambulte ad christina post bedrest  -iv hydration  -encourage PO fluids  -plan of care discussed with patient, family and MD Segovia  -post procedure and d/c instructions reviewed  -follow up with MD in 1-2 weeks  -Discussed therapeutic lifestyle changes to reduce risk factors such as following a cardiac diet, weight loss, maintaining a healthy weight, exercise, smoking cessation, medication compliance, and regular follow-up  with MD to know your numbers (BP, cholesterol, weight, and glucose)

## 2017-05-08 NOTE — PROGRESS NOTE ADULT - SUBJECTIVE AND OBJECTIVE BOX
HPI:  83 year old woman with PMHx of asthma, chronic back pain, who presents with worsening dyspnea on exertion for past one week   Patient was feeling weak for few days with subsequent cough with  scanty productive cough , with chest heavyness , increase with activity , occasional wheezing , patient did receive bronchodilator treatment , with antibiotics , however she continue feel weak , decided to come to ER   . Pt recently treated for URI with zithromax and prednisone.     in ER: Given lasix, albuterol, aspirin 325mg and ceftriaxone for possible  pneumonia  (06 May 2017 15:11)  Patient blood work showed mild elevated troponin  with new EKg changes     patient says she had  negative cardiac work up 2 years     2017  Patient is feeling better ,denies any chest pain , breathing better ,         PAST MEDICAL & SURGICAL HISTORY:  IBS (irritable bowel syndrome)  Chronic back pain  Asthma  Fracture of left wrist, closed, initial encounter: s/p repair x 2  Closed fracture of right femur, unspecified fracture morphology, unspecified portion of femur, initial encounter   delivery delivered: x 2  Bakers cyst  S/P cataract surgery: left  History of back surgery      Allergies  almost  from MRI dye (Other)  No Known Drug Allergies    Intolerances    oxycodone (Nausea)      SOCIAL HISTORY: never smoker     FAMILY HISTORY:  No pertinent family history in first degree relatives      MEDICATIONS  (STANDING):  buDESOnide 160 MICROgram(s)/formoterol 4.5 MICROgram(s) Inhaler 2Puff(s) Inhalation two times a day  enoxaparin Injectable 40milliGRAM(s) SubCutaneous every 24 hours  metoprolol 12.5milliGRAM(s) Oral every 12 hours  docusate sodium 100milliGRAM(s) Oral three times a day  aspirin  chewable 81milliGRAM(s) Oral daily  famotidine    Tablet 20milliGRAM(s) Oral at bedtime  clopidogrel Tablet 75milliGRAM(s) Oral daily  fentaNYL   Patch  50 MICROgram(s)/Hr. 1Patch Transdermal every 48 hours    MEDICATIONS  (PRN):  ondansetron Injectable 4milliGRAM(s) IV Push every 6 hours PRN Nausea  zolpidem 5milliGRAM(s) Oral at bedtime PRN Insomnia  ALBUTerol    90 MICROgram(s) HFA Inhaler 2Puff(s) Inhalation every 6 hours PRN Bronchospasm    REVIEW OF SYSTEMS:     no fever   All other review of systems is negative unless indicated above    Vital Signs Last 24 Hrs  T(C): 36.8, Max: 36.8 (-08 @ 10:10)  T(F): 98.3, Max: 98.3 (-08 @ 10:10)  HR: 86 (80 - 104)  BP: 121/87 (121/87 - 170/93)  BP(mean): --  RR: 20 (20 - 20)  SpO2: 86% (86% - 93%)    I&O's Summary    I & Os for current day (as of 08 May 2017 11:28)  =============================================  IN: 0 ml / OUT: 200 ml / NET: -200 ml    PHYSICAL EXAM:    Constitutional: NAD, awake and alert, thin built   HEENT: PERR, EOMI,  No oral cyanosis  Neck:  supple,  No JVD  Respiratory: Breath sounds are clear bilaterally,scattered  rhonchi  Cardiovascular: S1 and S2, regular rate and rhythm, with ectopic beats  Gastrointestinal: Bowel Sounds present, soft, nontender.   Extremities: No peripheral edema. No clubbing or cyanosis.  Vascular: 2+ peripheral pulses  Neurological: A/O x 3, no focal deficits  Musculoskeletal: no calf tenderness.  Skin: No rashes.      LABS: All Labs Reviewed:                                 12.9   9.5   )-----------( 308      ( 07 May 2017 05:28 )             38.3     05-08    146<H>  |  106  |  29<H>  ----------------------------<  99  4.1   |  32<H>  |  0.67    Ca    9.8      08 May 2017 05:45  Phos  3.1     05-07  Mg     2.1     05-07    TPro  6.7  /  Alb  2.6<L>  /  TBili  0.4  /  DBili  x   /  AST  20  /  ALT  29  /  AlkPhos  107  05-07    CARDIAC MARKERS ( 06 May 2017 19:42 )  0.312 ng/mL / x     / x     / x     / x      CARDIAC MARKERS ( 06 May 2017 16:25 )  0.384 ng/mL / x     / x     / x     / x      CARDIAC MARKERS ( 06 May 2017 12:42 )  0.415 ng/mL / x     / x     / x     / x          CARDIAC MARKERS ( 06 May 2017 19:42 )  0.312 ng/mL / x     / x     / x     / x      CARDIAC MARKERS ( 06 May 2017 16:25 )  0.384 ng/mL / x     / x     / x     / x      CARDIAC MARKERS ( 06 May 2017 12:42 )  0.415 ng/mL / x     / x     / x     / x          Blood Culture:    @ 05:28  Pro Bnp 2190   @ 12:42  Pro Bnp 1377     @ 05:28  TSH: 0.243      RADIOLOGY/EK2017  Sinus tachycardia  frequent PVC  left axis  ICRBBB,  2017  Sinus rhythm  New T wave inversion inferior leads , anterior leads ,       chest x ray 2017   NTERPRETATION:  CHEST SINGLE VIEW FRONTAL    Single AP view    HISTORY:  fever    Comparison:  none.    The cardiac silhouette is within normal limits. The lungs are clear. No   effusion.    IMPRESSION: Clear lungs        Echo: preliminary , normal EF

## 2017-05-09 DIAGNOSIS — R94.31 ABNORMAL ELECTROCARDIOGRAM [ECG] [EKG]: ICD-10-CM

## 2017-05-09 LAB
ALBUMIN SERPL ELPH-MCNC: 3 G/DL — LOW (ref 3.3–5)
ALP SERPL-CCNC: 101 U/L — SIGNIFICANT CHANGE UP (ref 40–120)
ALT FLD-CCNC: 33 U/L — SIGNIFICANT CHANGE UP (ref 12–78)
ANION GAP SERPL CALC-SCNC: 8 MMOL/L — SIGNIFICANT CHANGE UP (ref 5–17)
AST SERPL-CCNC: 25 U/L — SIGNIFICANT CHANGE UP (ref 15–37)
BILIRUB SERPL-MCNC: 0.3 MG/DL — SIGNIFICANT CHANGE UP (ref 0.2–1.2)
BUN SERPL-MCNC: 23 MG/DL — SIGNIFICANT CHANGE UP (ref 7–23)
CALCIUM SERPL-MCNC: 9.3 MG/DL — SIGNIFICANT CHANGE UP (ref 8.5–10.1)
CHLORIDE SERPL-SCNC: 107 MMOL/L — SIGNIFICANT CHANGE UP (ref 96–108)
CO2 SERPL-SCNC: 31 MMOL/L — SIGNIFICANT CHANGE UP (ref 22–31)
CREAT SERPL-MCNC: 0.62 MG/DL — SIGNIFICANT CHANGE UP (ref 0.5–1.3)
D DIMER BLD IA.RAPID-MCNC: 340 NG/ML DDU — HIGH
GLUCOSE SERPL-MCNC: 139 MG/DL — HIGH (ref 70–99)
HCT VFR BLD CALC: 42.2 % — SIGNIFICANT CHANGE UP (ref 34.5–45)
HGB BLD-MCNC: 13.6 G/DL — SIGNIFICANT CHANGE UP (ref 11.5–15.5)
MCHC RBC-ENTMCNC: 29.1 PG — SIGNIFICANT CHANGE UP (ref 27–34)
MCHC RBC-ENTMCNC: 32.4 GM/DL — SIGNIFICANT CHANGE UP (ref 32–36)
MCV RBC AUTO: 89.7 FL — SIGNIFICANT CHANGE UP (ref 80–100)
PLATELET # BLD AUTO: 393 K/UL — SIGNIFICANT CHANGE UP (ref 150–400)
POTASSIUM SERPL-MCNC: 3.7 MMOL/L — SIGNIFICANT CHANGE UP (ref 3.5–5.3)
POTASSIUM SERPL-SCNC: 3.7 MMOL/L — SIGNIFICANT CHANGE UP (ref 3.5–5.3)
PROT SERPL-MCNC: 7.2 GM/DL — SIGNIFICANT CHANGE UP (ref 6–8.3)
RBC # BLD: 4.7 M/UL — SIGNIFICANT CHANGE UP (ref 3.8–5.2)
RBC # FLD: 12.7 % — SIGNIFICANT CHANGE UP (ref 10.3–14.5)
SODIUM SERPL-SCNC: 146 MMOL/L — HIGH (ref 135–145)
WBC # BLD: 7 K/UL — SIGNIFICANT CHANGE UP (ref 3.8–10.5)
WBC # FLD AUTO: 7 K/UL — SIGNIFICANT CHANGE UP (ref 3.8–10.5)

## 2017-05-09 PROCEDURE — 99232 SBSQ HOSP IP/OBS MODERATE 35: CPT

## 2017-05-09 RX ORDER — DIPHENHYDRAMINE HCL 50 MG
25 CAPSULE ORAL ONCE
Qty: 0 | Refills: 0 | Status: COMPLETED | OUTPATIENT
Start: 2017-05-09 | End: 2017-05-09

## 2017-05-09 RX ORDER — CEFTRIAXONE 500 MG/1
1 INJECTION, POWDER, FOR SOLUTION INTRAMUSCULAR; INTRAVENOUS EVERY 24 HOURS
Qty: 0 | Refills: 0 | Status: DISCONTINUED | OUTPATIENT
Start: 2017-05-10 | End: 2017-05-11

## 2017-05-09 RX ORDER — CEFTRIAXONE 500 MG/1
INJECTION, POWDER, FOR SOLUTION INTRAMUSCULAR; INTRAVENOUS
Qty: 0 | Refills: 0 | Status: DISCONTINUED | OUTPATIENT
Start: 2017-05-09 | End: 2017-05-11

## 2017-05-09 RX ORDER — CEFTRIAXONE 500 MG/1
1 INJECTION, POWDER, FOR SOLUTION INTRAMUSCULAR; INTRAVENOUS ONCE
Qty: 0 | Refills: 0 | Status: COMPLETED | OUTPATIENT
Start: 2017-05-09 | End: 2017-05-09

## 2017-05-09 RX ORDER — ALBUTEROL 90 UG/1
2.5 AEROSOL, METERED ORAL EVERY 6 HOURS
Qty: 0 | Refills: 0 | Status: DISCONTINUED | OUTPATIENT
Start: 2017-05-09 | End: 2017-05-11

## 2017-05-09 RX ORDER — AMLODIPINE BESYLATE 2.5 MG/1
5 TABLET ORAL AT BEDTIME
Qty: 0 | Refills: 0 | Status: DISCONTINUED | OUTPATIENT
Start: 2017-05-09 | End: 2017-05-09

## 2017-05-09 RX ADMIN — Medication 81 MILLIGRAM(S): at 11:33

## 2017-05-09 RX ADMIN — Medication 25 MILLIGRAM(S): at 22:18

## 2017-05-09 RX ADMIN — ENOXAPARIN SODIUM 40 MILLIGRAM(S): 100 INJECTION SUBCUTANEOUS at 16:09

## 2017-05-09 RX ADMIN — FENTANYL CITRATE 1 PATCH: 50 INJECTION INTRAVENOUS at 11:33

## 2017-05-09 RX ADMIN — Medication 100 MILLIGRAM(S): at 16:09

## 2017-05-09 RX ADMIN — CEFTRIAXONE 100 GRAM(S): 500 INJECTION, POWDER, FOR SOLUTION INTRAMUSCULAR; INTRAVENOUS at 14:02

## 2017-05-09 RX ADMIN — Medication 25 MILLIGRAM(S): at 07:04

## 2017-05-09 RX ADMIN — Medication 100 MILLIGRAM(S): at 07:04

## 2017-05-09 RX ADMIN — ZOLPIDEM TARTRATE 5 MILLIGRAM(S): 10 TABLET ORAL at 22:17

## 2017-05-09 RX ADMIN — Medication 500 MILLIGRAM(S): at 07:03

## 2017-05-09 RX ADMIN — FENTANYL CITRATE 1 PATCH: 50 INJECTION INTRAVENOUS at 11:43

## 2017-05-09 RX ADMIN — Medication 600 MILLIGRAM(S): at 07:04

## 2017-05-09 RX ADMIN — Medication 600 MILLIGRAM(S): at 17:43

## 2017-05-09 NOTE — PROGRESS NOTE ADULT - PROBLEM SELECTOR PLAN 3
Mild elevation in serum troponin without CAD; d/c Plavix; d/c Metoprolol (given the presence of asthma with acute exacerbation and the absence of CAD/LV dysfunction).
possible ACS as patient elevated troponin with New EKG changes   recommended cardiac cath . patient understand , accept the recommendation , will schedule cath today

## 2017-05-09 NOTE — PROGRESS NOTE ADULT - SUBJECTIVE AND OBJECTIVE BOX
REASON FOR VISIT: Abnormal ECG, elevated troponin    HPI:  83 year old woman with asthma, chronic back pain, IBS, admitted 5/6/17 with asthma exacerbation / bronchitis associated with abnormal ECG and elevated serum troponin --  now s/p coronary angiography (5/8) with no obstructive disease.    5/9/17:  Modest improvement in cough, dyspnea; no angina.    MEDICATIONS  (STANDING):  buDESOnide 160 MICROgram(s)/formoterol 4.5 MICROgram(s) Inhaler 2Puff(s) Inhalation two times a day  enoxaparin Injectable 40milliGRAM(s) SubCutaneous every 24 hours  docusate sodium 100milliGRAM(s) Oral three times a day  aspirin  chewable 81milliGRAM(s) Oral daily  famotidine    Tablet 20milliGRAM(s) Oral at bedtime  clopidogrel Tablet 75milliGRAM(s) Oral daily  fentaNYL   Patch  50 MICROgram(s)/Hr. 1Patch Transdermal every 48 hours  sodium chloride 0.45%. 500milliLiter(s) IV Continuous <Continuous>  metoprolol 25milliGRAM(s) Oral two times a day  predniSONE   Tablet 40milliGRAM(s) Oral daily  cefuroxime   Tablet 500milliGRAM(s) Oral every 12 hours  guaiFENesin ER 600milliGRAM(s) Oral every 12 hours    MEDICATIONS  (PRN):  ondansetron Injectable 4milliGRAM(s) IV Push every 6 hours PRN Nausea  zolpidem 5milliGRAM(s) Oral at bedtime PRN Insomnia  ALBUTerol    90 MICROgram(s) HFA Inhaler 2Puff(s) Inhalation every 6 hours PRN Bronchospasm    Vital Signs Last 24 Hrs  T(C): 36.4, Max: 36.8 (05-08 @ 10:10)  T(F): 97.5, Max: 98.3 (05-08 @ 10:10)  HR: 82 (73 - 86)  BP: 147/92 (121/87 - 150/71)  RR: 18 (16 - 20)  SpO2: 96% (86% - 98%)    PHYSICAL EXAM:  Constitutional: NAD, awake and alert  HEENT: No oral cyanosis  Neck:  supple,  No JVD  Respiratory: Nonlabored, no wheeze or crackles  Cardiovascular: S1 and S2, regular rate and rhythm, femoral cath site appears normal (no hematoma, clean/dry bandage in place)  Gastrointestinal: Bowel Sounds present, soft, nontender.   Extremities: No peripheral edema.   Skin: No rash.    LABS:                  13.6   7.0   )-----------( 393      ( 09 May 2017 06:41 )             42.2     146<H>  |  107  |  23  ----------------------------<  139<H>  3.7   |  31  |  0.62    Echo:  Left ventricular wall motion is normal.  The right ventricle is normal in size, wall thickness, wall motion, and  contractility.  The aortic valve is well visualized, appears mildly sclerotic. Trace aortic regurgitation.    ECG (5/7/17): Sinus rhythm with frequent Premature ventricular complexes  Left anterior fascicular block  Possible Inferior infarct (cited on or before 07-MAY-2017)  Anterior infarct (cited on or before 07-MAY-2017)  T wave abnormality, consider lateral ischemia  Abnormal ECG    Cath (5/8/17): Non obstructive coronary artery disease.   Normal LV systolic function. Estimated LV ejection fraction is 60 %.

## 2017-05-09 NOTE — PROGRESS NOTE ADULT - SUBJECTIVE AND OBJECTIVE BOX
SUBJECTIVE  patient feeling better with the cough and still has wheeze and cough and says want to ambulate feels not ready to discharge. seen by the cardiology and betablocker was discontinued as she has normal coronaries. No fever           OBJECTIVE     Vital Signs Last 24 Hrs  T(C): 36.5, Max: 36.8 (05-08 @ 20:11)  T(F): 97.7, Max: 98.2 (05-08 @ 20:11)  HR: 71 (71 - 85)  BP: 112/70 (112/70 - 150/71)  BP(mean): --  RR: 16 (16 - 19)  SpO2: 90% (90% - 98%)    PHYSICAL EXAMINATION:    GENERAL: The patient comfortable with no apparent distress.     HEENT: Head is normocephalic and atraumatic.      NECK: Supple with no elevated JVP.    LUNGS: Bilateral air entry with mild wheeze and rhonchi    HEART: S1 and S2 present without murmur.    ABDOMEN: Soft, nontender, and nondistended.     EXTREMITIES: No edema or calf tenderness.    NEUROLOGIC: Grossly intact.      MEDICATIONS     MEDICATIONS  (STANDING):  buDESOnide 160 MICROgram(s)/formoterol 4.5 MICROgram(s) Inhaler 2Puff(s) Inhalation two times a day  enoxaparin Injectable 40milliGRAM(s) SubCutaneous every 24 hours  docusate sodium 100milliGRAM(s) Oral three times a day  aspirin  chewable 81milliGRAM(s) Oral daily  famotidine    Tablet 20milliGRAM(s) Oral at bedtime  fentaNYL   Patch  50 MICROgram(s)/Hr. 1Patch Transdermal every 48 hours  sodium chloride 0.45%. 500milliLiter(s) IV Continuous <Continuous>  predniSONE   Tablet 40milliGRAM(s) Oral daily  guaiFENesin ER 600milliGRAM(s) Oral every 12 hours  cefTRIAXone   IVPB  IV Intermittent       LABS:                        13.6   7.0   )-----------( 393      ( 09 May 2017 06:41 )             42.2     05-09    146<H>  |  107  |  23  ----------------------------<  139<H>  3.7   |  31  |  0.62    Ca    9.3      09 May 2017 06:41    TPro  7.2  /  Alb  3.0<L>  /  TBili  0.3  /  DBili  x   /  AST  25  /  ALT  33  /  AlkPhos  101  05-09

## 2017-05-09 NOTE — PROGRESS NOTE ADULT - SUBJECTIVE AND OBJECTIVE BOX
Patient is a 83y old  Female who presents with a chief complaint of Difficulty breathing. (06 May 2017 15:11)      HPI:  83 year old woman with PMHx of asthma, chronic back pain, who presents with worsening dyspnea on exertion for past 5 days or so.  Breathing worse with ambulation.  SOB associated with generalized weakness.  Per son she is normally very active but has been lacking energy last several days.  Pt recently treated for URI with zithromax and prednisone.     in ER: Given lasix, albuterol, aspirin 325mg and ceftriaxone for possible CHF exacerbation. (06 May 2017 15:11).    : Seen at bedside, still winded on ambulation.  Comfortable at rest.  Denies fever, chills, N, V, abd pain, CP.  : Pt still with dyspnea on exertion.  No fever, chills.  Has cough but non-productive.    : Continued MARTIN.  s/p cath showing non-obstructive CAD.  CT revealed likely bronchitis/PNA.  Started on antibiotics.      Review of system- Rest of the review of system are normal except mentioned in HPI    Vital Signs Last 24 Hrs  T(C): 36.5, Max: 36.8 (05-08 @ 20:11)  T(F): 97.7, Max: 98.2 (05-08 @ 20:11)  HR: 71 (71 - 85)  BP: 112/70 (112/70 - 147/92)  BP(mean): --  RR: 16 (16 - 18)  SpO2: 90% (90% - 97%)    PHYSICAL EXAM:    Constitutional: NAD, awake and alert, well-developed  HEENT: PERR, EOMI, Normal Hearing, MMM  Neck: Soft and supple  Respiratory: Breath sounds are clear bilaterally, No wheezing, rales or rhonchi  Cardiovascular: S1 and S2, regular rate and rhythm, no Murmurs, gallops or rubs  Gastrointestinal: Bowel Sounds present, soft, nontender, nondistended, no guarding, no rebound  Extremities: No peripheral edema  Neurological: A/O x 3, no focal deficits  Skin: No rashes    MEDICATIONS  (STANDING):  buDESOnide 160 MICROgram(s)/formoterol 4.5 MICROgram(s) Inhaler 2Puff(s) Inhalation two times a day  enoxaparin Injectable 40milliGRAM(s) SubCutaneous every 24 hours  docusate sodium 100milliGRAM(s) Oral three times a day  aspirin  chewable 81milliGRAM(s) Oral daily  famotidine    Tablet 20milliGRAM(s) Oral at bedtime  fentaNYL   Patch  50 MICROgram(s)/Hr. 1Patch Transdermal every 48 hours  sodium chloride 0.45%. 500milliLiter(s) IV Continuous <Continuous>  predniSONE   Tablet 40milliGRAM(s) Oral daily  guaiFENesin ER 600milliGRAM(s) Oral every 12 hours  cefTRIAXone   IVPB  IV Intermittent   ALBUTerol    0.083% 2.5milliGRAM(s) Nebulizer every 6 hours    MEDICATIONS  (PRN):  ondansetron Injectable 4milliGRAM(s) IV Push every 6 hours PRN Nausea  zolpidem 5milliGRAM(s) Oral at bedtime PRN Insomnia                              13.6   7.0   )-----------( 393      ( 09 May 2017 06:41 )             42.2     05-    146<H>  |  107  |  23  ----------------------------<  139<H>  3.7   |  31  |  0.62    Ca    9.3      09 May 2017 06:41    TPro  7.2  /  Alb  3.0<L>  /  TBili  0.3  /  DBili  x   /  AST  25  /  ALT  33  /  AlkPhos  101  05-09    CAPILLARY BLOOD GLUCOSE    LIVER FUNCTIONS - ( 09 May 2017 06:41 )  Alb: 3.0 g/dL / Pro: 7.2 gm/dL / ALK PHOS: 101 U/L / ALT: 33 U/L / AST: 25 U/L / GGT: x             Urinalysis Basic - ( 07 May 2017 15:20 )    Color: Yellow / Appearance: Clear / S.015 / pH: x  Gluc: x / Ketone: Trace  / Bili: Negative / Urobili: Negative mg/dL   Blood: x / Protein: 30 mg/dL / Nitrite: Negative   Leuk Esterase: Negative / RBC: 3-5 /HPF / WBC 3-5   Sq Epi: x / Non Sq Epi: Occasional / Bacteria: Few      CT Chest:  IMPRESSION:     Mild right-sided bronchiolitis characterized by bronchial wall   thickening, distal airway mucous plugging, and tree-in-bud opacities.      Assessment and Plan:   Assessment:  · Assessment		  83 year old woman with PMHx of asthma who presents with worsening dyspnea on exertion.    SOB/MARTIN: Likely multifactorial - asthma exacerbation in setting of bronchitis/pneumonia  -d/c tele - no significant events.  -Troponins trending down.  Denies chest pain or discomfort.  -cardio catheterization --> non-obstructive CAD, preserved EF.  -xray official read --> Clear lungs however CT with bronchiolitis/mucous plugging/tree in bud opacities.  -Stop further metoprolol to prevent worsening bronchospam.  -RVP negative  -pulm consult appreciated - IV antibiotics and po steroids  -dulera  -fluticasone  -nebs    CAD :   -cath --> non-obstructive CAD  -elevated BNP. s/p nitro patch in ER and lasix therapy x 3 days - hold further.   -c/w aspirin   -lipid panel - acceptable  -a1c 5.6  -f/u echo = preserved EF, no significant valvular abnormalities.    Hypertension: New onset, uncontrolled  -hold metoprolol to prevent bronchospam.   -If remains elevated, will start ACEI    Severe protein calorie malnutrition:  -nutrition consult    Hyperthyroidism?: Borderline values.  -TSH borderline low with borderline high free T4  -endo eval as outpatient for repeat studies.    hypokalemia: secondary to lasix: RESOLVED.  -replete and monitor    Chronic  back pain: STABLE  -fentanyl 50mcg patch q48hrs    GERD: STABLE  -ranitidine 150mg    Dvt ppx:  -lovenox    Attending Statement:  >35 minutes spent on total encounter; more than 50% of the visit was spent counseling and/or coordinating care by the attending physician.

## 2017-05-09 NOTE — PROGRESS NOTE ADULT - PROBLEM SELECTOR PLAN 1
Dyspnea is improving and likely multifactorial - however, no significant CAD and normal LV function on echo.
multifactorial , possible acute bronchitis with asthma , new CHF ,       ecotrin,plavix  , echocardiogram to asses left ventricular function.

## 2017-05-09 NOTE — CDI QUERY NOTE - NSCDIOTHERTXTBX_GEN_ALL_CORE_HH
1) Per nutrition consult: Malnutrition; Patient meets criteria for severe protein energy malnutrition due to <75% intake of nutritional needs >7 days, and NFPE reveals severe clavicle, interosseous, biceps, quadriceps muscle wasting and severe orbital  fat loss, visible squaring of shoulders and protruding acromion process. BMI= 16.9    Please clarify if you agree or disagree with the clinical diagnosis of Severe protein-calorie malnutrition ?

## 2017-05-09 NOTE — PROGRESS NOTE ADULT - ASSESSMENT
IMPRESSION     - Asthma with symptoms of exacerbation with continued cough with ppt by bronchoilitis with tree in bud pattern of lung infiltrates with small vessel air way plugging   - status post cardiac cath with recent positive troponins with non obstructive coronaries.  - History of acid reflux .  - no chf with the cxr or current ct scan of the chest     PLAN   - would recommend as the pt still symptomatic with iv antibiotics during the hospital stay   - check sat on room air and with ambulation   - continue with prednisone and pt prefers to have low dose of prednisone   -  agree with the cardiology with discontinuation of metoprolol   - encourage ambulation .   - dvt prophylaxis with Lovenox.  - monitor for symptomatic improvement and follow up of the electrolytes

## 2017-05-09 NOTE — PROGRESS NOTE ADULT - PROBLEM SELECTOR PLAN 4
Associated with normal LV systolic function and essentially normal coronary arteries on cath
elevated troponin with eg changes ,  will continue ecotrin , will give plavix ,  recommend cardiac cath , will evaluate after echo.

## 2017-05-10 PROCEDURE — 78582 LUNG VENTILAT&PERFUS IMAGING: CPT | Mod: 26

## 2017-05-10 PROCEDURE — 71010: CPT | Mod: 26

## 2017-05-10 RX ORDER — ASPIRIN/CALCIUM CARB/MAGNESIUM 324 MG
1 TABLET ORAL
Qty: 0 | Refills: 0 | COMMUNITY
Start: 2017-05-10

## 2017-05-10 RX ORDER — LISINOPRIL 2.5 MG/1
10 TABLET ORAL DAILY
Qty: 0 | Refills: 0 | Status: DISCONTINUED | OUTPATIENT
Start: 2017-05-10 | End: 2017-05-11

## 2017-05-10 RX ADMIN — ENOXAPARIN SODIUM 40 MILLIGRAM(S): 100 INJECTION SUBCUTANEOUS at 17:16

## 2017-05-10 RX ADMIN — LISINOPRIL 10 MILLIGRAM(S): 2.5 TABLET ORAL at 17:14

## 2017-05-10 RX ADMIN — CEFTRIAXONE 100 GRAM(S): 500 INJECTION, POWDER, FOR SOLUTION INTRAMUSCULAR; INTRAVENOUS at 12:57

## 2017-05-10 RX ADMIN — Medication 600 MILLIGRAM(S): at 17:14

## 2017-05-10 RX ADMIN — ZOLPIDEM TARTRATE 5 MILLIGRAM(S): 10 TABLET ORAL at 23:02

## 2017-05-10 RX ADMIN — Medication 81 MILLIGRAM(S): at 12:50

## 2017-05-10 RX ADMIN — Medication 100 MILLIGRAM(S): at 14:30

## 2017-05-10 RX ADMIN — ALBUTEROL 2.5 MILLIGRAM(S): 90 AEROSOL, METERED ORAL at 14:13

## 2017-05-10 RX ADMIN — Medication 600 MILLIGRAM(S): at 05:52

## 2017-05-10 RX ADMIN — ALBUTEROL 2.5 MILLIGRAM(S): 90 AEROSOL, METERED ORAL at 19:44

## 2017-05-10 RX ADMIN — Medication 40 MILLIGRAM(S): at 05:52

## 2017-05-10 RX ADMIN — BUDESONIDE AND FORMOTEROL FUMARATE DIHYDRATE 2 PUFF(S): 160; 4.5 AEROSOL RESPIRATORY (INHALATION) at 19:54

## 2017-05-10 RX ADMIN — Medication 100 MILLIGRAM(S): at 21:03

## 2017-05-10 RX ADMIN — FAMOTIDINE 20 MILLIGRAM(S): 10 INJECTION INTRAVENOUS at 21:03

## 2017-05-10 RX ADMIN — BUDESONIDE AND FORMOTEROL FUMARATE DIHYDRATE 2 PUFF(S): 160; 4.5 AEROSOL RESPIRATORY (INHALATION) at 07:41

## 2017-05-10 RX ADMIN — ALBUTEROL 2.5 MILLIGRAM(S): 90 AEROSOL, METERED ORAL at 07:41

## 2017-05-10 NOTE — SWALLOW BEDSIDE ASSESSMENT ADULT - ORAL PHASE
Bolus formation/transfer were mechanically functional for age and  she cleared oral debris on own after age acceptable piecemeal deglutition.

## 2017-05-10 NOTE — SWALLOW BEDSIDE ASSESSMENT ADULT - SLP GENERAL OBSERVATIONS
Pt seen at bedside, On encounter, pt was alert and interactive. A non nutritive cough was noted at times in setting of acute on chronic pulmonary issues(PNA/asthma). Patient was verbally responsive and c/o decreased appetite while acutely ill. When verbalizing, she demonstrated functional vocal Presbypnonia in setting of advanced age with presbyphonic features possibly appearing heightened at times when respiratory statis is acutely deconditioned. With that being stated, pt's voice was functionally audible. Moreover, pt was able to competently verbalize her intents conversation without an overt primary motor speech or primary linguistic pathology. No overt dysarthria, verbal apraxia or aphasia were evident on exam. Pt seen at bedside, On encounter, pt was alert and interactive. A non nutritive cough was noted at times in setting of acute on chronic pulmonary issues(PNA/asthma). Patient was verbally responsive and c/o decreased appetite while acutely ill. When verbalizing, she demonstrated functional vocal Presbypnonia in setting of advanced age with presbyphonic features possibly appearing heightened at times when respiratory status is acutely declined. With that being stated, pt's voice was functionally audible. Moreover, pt was able to competently verbalize her intents in conversation without an overt primary motor speech or primary linguistic pathology. No overt dysarthria, verbal apraxia or aphasia were evident on exam.

## 2017-05-10 NOTE — SWALLOW BEDSIDE ASSESSMENT ADULT - COMMENTS
Pt admitted to  with dyspnea, and generalized weakness. Hosp course is notable for pneumonia with asthma exacerbation, elevated BP, hyperthyroidism, elevated troponins, hypokalemia, protein calorie malnutrition and generalized weakness, This profile is superimposed upon a history of recent URI(treated with abx), asthma, CAD, HTN, GERD and chronic back pain. Pt admitted to  with dyspnea, and generalized weakness. Hosp course is notable for pneumonia with asthma exacerbation, elevated BP, hyperthyroidism, elevated troponins, hypokalemia, protein calorie malnutrition and generalized weakness, This profile is superimposed upon a history of recent URI(treated with abx), asthma, CAD, HTN, IBS, GERD, chronic back pain, cataracts s/p sx, Bakers Cyst s/p sx.

## 2017-05-10 NOTE — SWALLOW BEDSIDE ASSESSMENT ADULT - SWALLOW EVAL: CRITERIA FOR SKILLED INTERVENTION MET
no problems identified which require skilled intervention/Acute speech path intervention is not clinically warranted. Thus, will NOT actively follow. Reconsult PRN.

## 2017-05-10 NOTE — SWALLOW BEDSIDE ASSESSMENT ADULT - SWALLOW EVAL: DIAGNOSIS
1) Pt with non nutritive cough in setting of acute on chronic pulmonary issues(PNA/asthma) as well as c/o acutely decreased appetite while acutely ill. This is atop Oropharyngeal Swallowing which subjectively appeared to be stable and within functional parameters for age. No behavioral aspiration signs exhibited. Do NOT feel that non nutritive cough is reflective of an overt oropharyngeal swallowing problem,   2) Pt demonstrates functional vocal Presbypnonia in setting of advanced age with presbyphonic features possibly appearing heightened at times when respiratory statis is acutely deconditioned. With that being stated, pt's voice was functionally audible. Moreover, pt was able to competently verbalize her intents conversation without an overt primary motor speech or primary linguistic pathology. No overt dysarthria, verbal apraxia or aphasia were evident on exam. 1) Pt with non nutritive cough in setting of acute on chronic pulmonary issues(PNA/asthma) as well as c/o recently decreased appetite while acutely ill. This is atop Oropharyngeal Swallowing which subjectively appeared to be stable and within functional parameters for age. No behavioral aspiration signs exhibited. Do NOT feel that non nutritive cough is reflective of an overt oropharyngeal swallowing problem,   2) Pt demonstrates functional vocal Presbypnonia in setting of advanced age with presbyphonic features possibly appearing heightened at times when respiratory status is acutely decompensated. With that being stated, pt's voice was functionally audible. Moreover, pt was able to competently verbalize her intents in conversation without an overt primary motor speech or primary linguistic pathology. No overt dysarthria, verbal apraxia or aphasia were evident on exam.

## 2017-05-10 NOTE — PROGRESS NOTE ADULT - SUBJECTIVE AND OBJECTIVE BOX
Patient is a 83y old  Female who presents with a chief complaint of Difficulty breathing. (06 May 2017 15:11)      HPI:  83 year old woman with PMHx of asthma, chronic back pain, who presents with worsening dyspnea on exertion for past 5 days or so.  Breathing worse with ambulation.  SOB associated with generalized weakness.  Per son she is normally very active but has been lacking energy last several days.  Pt recently treated for URI with zithromax and prednisone.     in ER: Given lasix, albuterol, aspirin 325mg and ceftriaxone for possible CHF exacerbation. (06 May 2017 15:11).    5/7: Seen at bedside, still winded on ambulation.  Comfortable at rest.  Denies fever, chills, N, V, abd pain, CP.  5/8: Pt still with dyspnea on exertion.  No fever, chills.  Has cough but non-productive.    5/9: Continued MARTIN.  s/p cath showing non-obstructive CAD.  CT revealed likely bronchitis/PNA.  Started on antibiotics.  5/10: Breathing/MARTIN improving per pt.  Feeling better on steroid therapy and antibiotics.  Denies fever, chills, N, V, abd pain, CP.    Review of system- Rest of the review of system are normal except mentioned in HPI    Vital Signs Last 24 Hrs  T(C): 36.8, Max: 36.8 (05-09 @ 20:00)  T(F): 98.3, Max: 98.3 (05-09 @ 20:00)  HR: 76 (76 - 87)  BP: 127/71 (127/71 - 154/84)  BP(mean): --  RR: 18 (18 - 18)  SpO2: 91% (91% - 99%)    PHYSICAL EXAM:    Constitutional: NAD, awake and alert, frail, thin, anxious appearing  HEENT: PERR, EOMI, Normal Hearing, MMM  Neck: Soft and supple  Respiratory: Breath sounds are clear bilaterally, No wheezing, rales or rhonchi  Cardiovascular: S1 and S2, regular rate and rhythm, no Murmurs, gallops or rubs  Gastrointestinal: Bowel Sounds present, soft, nontender, nondistended, no guarding, no rebound  Extremities: No peripheral edema  Neurological: A/O x 3, no focal deficits  Skin: No rashes    MEDICATIONS  (STANDING):  buDESOnide 160 MICROgram(s)/formoterol 4.5 MICROgram(s) Inhaler 2Puff(s) Inhalation two times a day  enoxaparin Injectable 40milliGRAM(s) SubCutaneous every 24 hours  docusate sodium 100milliGRAM(s) Oral three times a day  aspirin  chewable 81milliGRAM(s) Oral daily  famotidine    Tablet 20milliGRAM(s) Oral at bedtime  fentaNYL   Patch  50 MICROgram(s)/Hr. 1Patch Transdermal every 48 hours  predniSONE   Tablet 40milliGRAM(s) Oral daily  guaiFENesin ER 600milliGRAM(s) Oral every 12 hours  cefTRIAXone   IVPB  IV Intermittent   ALBUTerol    0.083% 2.5milliGRAM(s) Nebulizer every 6 hours  cefTRIAXone   IVPB 1Gram(s) IV Intermittent every 24 hours    MEDICATIONS  (PRN):  ondansetron Injectable 4milliGRAM(s) IV Push every 6 hours PRN Nausea  zolpidem 5milliGRAM(s) Oral at bedtime PRN Insomnia                              13.6   7.0   )-----------( 393      ( 09 May 2017 06:41 )             42.2     05-09    146<H>  |  107  |  23  ----------------------------<  139<H>  3.7   |  31  |  0.62    Ca    9.3      09 May 2017 06:41    TPro  7.2  /  Alb  3.0<L>  /  TBili  0.3  /  DBili  x   /  AST  25  /  ALT  33  /  AlkPhos  101  05-09    CAPILLARY BLOOD GLUCOSE    LIVER FUNCTIONS - ( 09 May 2017 06:41 )  Alb: 3.0 g/dL / Pro: 7.2 gm/dL / ALK PHOS: 101 U/L / ALT: 33 U/L / AST: 25 U/L / GGT: x             V/Q scan 5/10: IMPRESSION: Abnormal ventilation/perfusion lung scan.  Low probability of pulmonary embolus.    CT Chest:  IMPRESSION:     Mild right-sided bronchiolitis characterized by bronchial wall   thickening, distal airway mucous plugging, and tree-in-bud opacities.      Assessment and Plan:   Assessment:  · Assessment		  83 year old woman with PMHx of asthma who presents with worsening dyspnea on exertion.    SOB/MARTIN: Likely multifactorial - asthma exacerbation in setting of bronchitis/pneumonia  -d/c tele - no significant events.  -xray official read --> Clear lungs however CT with bronchiolitis/mucous plugging/tree in bud opacities.  -d-dimer slightly elevated, v/q scan negative for PE.  -RVP negative  -Stop further metoprolol to prevent worsening bronchospam.  -pulm consult appreciated --> IV antibiotics and po steroids  -dulera  -fluticasone  -nebs    CAD :   -cath --> non-obstructive CAD, preserved EF.  -Troponins trending down.  Denies chest pain or discomfort.   -elevated BNP. s/p nitro patch in ER and lasix therapy x 3 days - hold further.   -c/w aspirin   -lipid panel - acceptable  -a1c 5.6  -f/u echo = preserved EF, no significant valvular abnormalities.    Hypertension: New onset, uncontrolled  -hold metoprolol to prevent bronchospam.   -start lisinopril 10mg and monitor    Severe protein calorie malnutrition:  -nutrition consult appreciated - increase protein, ensure.    -Card given for meals on wheels program.    Hyperthyroidism?: Borderline values.  -TSH borderline low with borderline high free T4  -Spoke with endocrine, and would like endo eval as outpatient for repeat studies.    hypokalemia: secondary to lasix: RESOLVED.  -replete and monitor    Chronic  back pain: STABLE  -fentanyl 50mcg patch q48hrs    GERD: STABLE  -ranitidine 150mg    Dvt ppx:  -lovenox    Dispo:  -If continues to improve, d/c tomorrow on po abx.    Attending Statement:  >35 minutes spent on total encounter; more than 50% of the visit was spent counseling and/or coordinating care by the attending physician.

## 2017-05-10 NOTE — SWALLOW BEDSIDE ASSESSMENT ADULT - SWALLOW EVAL: RECOMMENDED DIET
No oropharyngeal swallowing contraindications evident for being on a regular texture diet with thin liquids as pt tolerates same from an oropharyngeal swallowing stance.

## 2017-05-10 NOTE — PROGRESS NOTE ADULT - ASSESSMENT
IMPRESSION     - Asthma with symptoms of exacerbation with continued cough with ppt by bronchoilitis with tree in bud pattern of lung infiltrates with small vessel air way plugging   - status post cardiac cath with recent positive troponins with non obstructive coronaries.  - History of acid reflux .  - no chf with the cxr or current ct scan of the chest   - elevated didimer could not specific how ever has un explined troponins with no obstructive air way disease   - episode of confusion resolved could be related with anxiety     PLAN   - would recommend obtaining vq scan given the recent contrast study with angiogram   - check sat on room air and with ambulation today with desaturation   - continue with prednisone along with symbicort now    -  agree with the cardiology with discontinuation of metoprolol   - encourage ambulation .   - dvt prophylaxis with Lovenox.  - monitor for symptomatic improvement and follow up of the electrolytes

## 2017-05-10 NOTE — SWALLOW BEDSIDE ASSESSMENT ADULT - SWALLOW EVAL: SECRETION MANAGEMENT
Adequate on clinical exam. Note that pt's volitional cough was somewhat moist but produced with sufficient strength.

## 2017-05-10 NOTE — PROGRESS NOTE ADULT - SUBJECTIVE AND OBJECTIVE BOX
SUBJECTIVE    Patient has an episode of confusion and anxiety last night and currently feels better and found to have elevated didimer assay which could be non specific . patient mentions she has past history following immobilization and was treated with the coumadin with no clear history of the P.E . fells cough and wheezing is better . no fever or chest pain .      OBJECTIVE     Vital Signs Last 24 Hrs  T(C): 36.8, Max: 36.8 (05-09 @ 20:00)  T(F): 98.3, Max: 98.3 (05-09 @ 20:00)  HR: 82 (71 - 87)  BP: 127/71 (112/70 - 154/84)  BP(mean): --  RR: 18 (16 - 18)  SpO2: 99% (90% - 99%)    PHYSICAL EXAMINATION:    GENERAL: The patient comfortable with no apparent distress.     HEENT: Head is normocephalic and atraumatic.      NECK: Supple with no elevated JVP.    LUNGS: Bilateral air entry with mild prolonged expiration     HEART: S1 and S2 present without murmur.    ABDOMEN: Soft, nontender, and nondistended.     EXTREMITIES: No edema or calf tenderness.    NEUROLOGIC: Grossly intact.      MEDICATIONS     MEDICATIONS  (STANDING):  buDESOnide 160 MICROgram(s)/formoterol 4.5 MICROgram(s) Inhaler 2Puff(s) Inhalation two times a day  enoxaparin Injectable 40milliGRAM(s) SubCutaneous every 24 hours  docusate sodium 100milliGRAM(s) Oral three times a day  aspirin  chewable 81milliGRAM(s) Oral daily  famotidine    Tablet 20milliGRAM(s) Oral at bedtime  fentaNYL   Patch  50 MICROgram(s)/Hr. 1Patch Transdermal every 48 hours  predniSONE   Tablet 40milliGRAM(s) Oral daily  guaiFENesin ER 600milliGRAM(s) Oral every 12 hours  cefTRIAXone   IVPB  IV Intermittent   ALBUTerol    0.083% 2.5milliGRAM(s) Nebulizer every 6 hours  cefTRIAXone   IVPB 1Gram(s) IV Intermittent every 24 hours      LABS:                        13.6   7.0   )-----------( 393      ( 09 May 2017 06:41 )             42.2     05-09    146<H>  |  107  |  23  ----------------------------<  139<H>  3.7   |  31  |  0.62    Ca    9.3      09 May 2017 06:41    TPro  7.2  /  Alb  3.0<L>  /  TBili  0.3  /  DBili  x   /  AST  25  /  ALT  33  /  AlkPhos  101  05-09

## 2017-05-10 NOTE — SWALLOW BEDSIDE ASSESSMENT ADULT - ADDITIONAL RECOMMENDATIONS
1) Dietary f/u. Pt at nutrition risk. Provide supplements as needed.    2) Medical f/u per attendings, Note that I do NOT feel that patient's non nutritive cough is reflective of an overt dysphagic/aspiration syndrome.

## 2017-05-10 NOTE — SWALLOW BEDSIDE ASSESSMENT ADULT - PHARYNGEAL PHASE
Swallow triggered in an acceptable time frame for age. Laryngeal lift on palpation during swallow trials was functional and within age acceptable parameters. No behavioral aspiration signs were exhibited. Odynophagia was denied.

## 2017-05-11 ENCOUNTER — TRANSCRIPTION ENCOUNTER (OUTPATIENT)
Age: 82
End: 2017-05-11

## 2017-05-11 VITALS
DIASTOLIC BLOOD PRESSURE: 67 MMHG | HEART RATE: 86 BPM | TEMPERATURE: 98 F | SYSTOLIC BLOOD PRESSURE: 121 MMHG | OXYGEN SATURATION: 91 % | RESPIRATION RATE: 18 BRPM

## 2017-05-11 LAB
CULTURE RESULTS: SIGNIFICANT CHANGE UP
CULTURE RESULTS: SIGNIFICANT CHANGE UP
SPECIMEN SOURCE: SIGNIFICANT CHANGE UP
SPECIMEN SOURCE: SIGNIFICANT CHANGE UP

## 2017-05-11 RX ORDER — MOMETASONE FUROATE AND FORMOTEROL FUMARATE DIHYDRATE 200; 5 UG/1; UG/1
2 AEROSOL RESPIRATORY (INHALATION)
Qty: 0 | Refills: 0 | COMMUNITY

## 2017-05-11 RX ORDER — ALBUTEROL 90 UG/1
2 AEROSOL, METERED ORAL
Qty: 0 | Refills: 0 | COMMUNITY

## 2017-05-11 RX ORDER — RANITIDINE HYDROCHLORIDE 150 MG/1
1 TABLET, FILM COATED ORAL
Qty: 0 | Refills: 0 | COMMUNITY

## 2017-05-11 RX ORDER — FENTANYL CITRATE 50 UG/ML
1 INJECTION INTRAVENOUS
Qty: 0 | Refills: 0 | COMMUNITY

## 2017-05-11 RX ORDER — LISINOPRIL 2.5 MG/1
1 TABLET ORAL
Qty: 30 | Refills: 0
Start: 2017-05-11 | End: 2017-06-10

## 2017-05-11 RX ORDER — CEFOXITIN 1 G/1
1 INJECTION, POWDER, FOR SOLUTION INTRAVENOUS
Qty: 10 | Refills: 0
Start: 2017-05-11 | End: 2017-05-16

## 2017-05-11 RX ORDER — ALBUTEROL 90 UG/1
2 AEROSOL, METERED ORAL
Qty: 1 | Refills: 0
Start: 2017-05-11 | End: 2017-06-10

## 2017-05-11 RX ORDER — FLUTICASONE PROPIONATE 50 MCG
1 SPRAY, SUSPENSION NASAL
Qty: 0 | Refills: 0 | COMMUNITY

## 2017-05-11 RX ORDER — ASPIRIN/CALCIUM CARB/MAGNESIUM 324 MG
1 TABLET ORAL
Qty: 30 | Refills: 0
Start: 2017-05-11 | End: 2017-06-10

## 2017-05-11 RX ORDER — LACTOBACILLUS ACIDOPHILUS 100MM CELL
2 CAPSULE ORAL
Qty: 0 | Refills: 0 | COMMUNITY

## 2017-05-11 RX ADMIN — ALBUTEROL 2.5 MILLIGRAM(S): 90 AEROSOL, METERED ORAL at 13:52

## 2017-05-11 RX ADMIN — FENTANYL CITRATE 1 PATCH: 50 INJECTION INTRAVENOUS at 11:54

## 2017-05-11 RX ADMIN — ALBUTEROL 2.5 MILLIGRAM(S): 90 AEROSOL, METERED ORAL at 08:03

## 2017-05-11 RX ADMIN — BUDESONIDE AND FORMOTEROL FUMARATE DIHYDRATE 2 PUFF(S): 160; 4.5 AEROSOL RESPIRATORY (INHALATION) at 11:57

## 2017-05-11 RX ADMIN — Medication 600 MILLIGRAM(S): at 06:15

## 2017-05-11 RX ADMIN — Medication 81 MILLIGRAM(S): at 11:59

## 2017-05-11 RX ADMIN — Medication 100 MILLIGRAM(S): at 06:15

## 2017-05-11 RX ADMIN — Medication 40 MILLIGRAM(S): at 06:15

## 2017-05-11 RX ADMIN — CEFTRIAXONE 100 GRAM(S): 500 INJECTION, POWDER, FOR SOLUTION INTRAMUSCULAR; INTRAVENOUS at 11:55

## 2017-05-11 RX ADMIN — ALBUTEROL 2.5 MILLIGRAM(S): 90 AEROSOL, METERED ORAL at 02:53

## 2017-05-11 RX ADMIN — FENTANYL CITRATE 1 PATCH: 50 INJECTION INTRAVENOUS at 11:59

## 2017-05-11 RX ADMIN — LISINOPRIL 10 MILLIGRAM(S): 2.5 TABLET ORAL at 06:16

## 2017-05-11 NOTE — PROGRESS NOTE ADULT - ASSESSMENT
83 year old woman with PMHx of asthma who presents with worsening dyspnea on exertion.    SOB/MARTIN: Likely multifactorial - asthma exacerbation in setting of bronchitis/pneumonia  -d/c tele - no significant events.  -xray official read --> Clear lungs however CT with bronchiolitis/mucous plugging/tree in bud opacities.  -d-dimer slightly elevated, v/q scan negative for PE.  -RVP negative  -Stop further metoprolol to prevent worsening bronchospam.  -pulm consult appreciated --> IV antibiotics and po steroids  -dulera  -fluticasone  -nebs  -O2 on Ambulation w/o oxygen was 92%  CAD :   -cath --> non-obstructive CAD, preserved EF.  -Troponins trending down.  Denies chest pain or discomfort.   -elevated BNP. s/p nitro patch in ER and lasix therapy x 3 days - hold further.   -c/w aspirin   -lipid panel - acceptable  -a1c 5.6  -f/u echo = preserved EF, no significant valvular abnormalities.    Hypertension: New onset, uncontrolled  -hold metoprolol to prevent bronchospam.   -start lisinopril 10mg and monitor    Severe protein calorie malnutrition:  -nutrition consult appreciated - increase protein, ensure.    -Card given for meals on wheels program.    Hyperthyroidism?: Borderline values.  -TSH borderline low with borderline high free T4  -Spoke with endocrine, and would like endo al as outpatient for repeat studies.    hypokalemia: secondary to lasix: RESOLVED.  -replete and monitor  Chronic  back pain: STABLE  -fentanyl 50mcg patch q48hrs  GERD: STABLE  -ranitidine 150mg  Dvt ppx:  -lovenox    DISCHARGE INSTRUCTIONS/FOLLOW UP/PENDING LABS:  -D/C Patient home on Ceftin, Prednisone taper and albuterol; FU with Pulm Dr Griffin in 3-4 days; D/CMetoprolol due to DW of bronchospasm and started on lisinopril   -Abnormal TSH levels needs outpatient Endocrine evaluation to Rule out Hyperthyroidism  Message left for Dr Stone  Discussed with pulm  Total time: 45 minutes 83 year old woman with PMHx of asthma who presents with worsening dyspnea on exertion.    SOB/MARTIN: Likely multifactorial - asthma exacerbation in setting of bronchitis/pneumonia suspected gram neg, gram pos and other resistant organisms  -d/c tele - no significant events.  -xray official read --> Clear lungs however CT with bronchiolitis/mucous plugging/tree in bud opacities.  -d-dimer slightly elevated, v/q scan negative for PE.  -RVP negative  -Stop further metoprolol to prevent worsening bronchospam.  -pulm consult appreciated --> IV antibiotics and po steroids  -dulera  -fluticasone  -nebs  -O2 on Ambulation w/o oxygen was 92%    CAD with elevated troponin :   -cath --> non-obstructive CAD, preserved EF.  -Troponins trending down.  Denies chest pain or discomfort.   -elevated BNP. s/p nitro patch in ER and lasix therapy x 3 days - hold further.   -c/w aspirin   -lipid panel - acceptable  -a1c 5.6  -f/u echo = preserved EF, no significant valvular abnormalities.    Hypertension: New onset, uncontrolled  -hold metoprolol to prevent bronchospam.   -start lisinopril 10mg and monitor    Severe protein calorie malnutrition:  -nutrition consult appreciated - increase protein, ensure.    -Card given for meals on wheels program.    Hyperthyroidism?: Borderline values.  -TSH borderline low with borderline high free T4  -Spoke with endocrine, and would like endo eval as outpatient for repeat studies.  hypokalemia: secondary to lasix: RESOLVED.  -replete and monitor  Chronic  back pain: STABLE  -fentanyl 50mcg patch q48hrs  GERD: STABLE  -ranitidine 150mg  Dvt ppx:  -lovenox    DISCHARGE INSTRUCTIONS/FOLLOW UP/PENDING LABS:  -D/C Patient home on Ceftin, Prednisone taper and albuterol; FU with Pulm Dr Griffin in 3-4 days; D/CMetoprolol due to DW of bronchospasm and started on lisinopril   -Abnormal TSH levels needs outpatient Endocrine evaluation to Rule out Hyperthyroidism  Message left for Dr Stone  Discussed with pulm  Total time: 45 minutes

## 2017-05-11 NOTE — DISCHARGE NOTE ADULT - HOSPITAL COURSE
PCP: Dr. Rubio O/cordelia  Cardio: Dr. Hu  Pulm: Dr Griffin    Patient is a 83y old  Female who presents with a chief complaint of Difficulty breathing. (06 May 2017 15:11)    HPI:  83 year old woman with PMHx of asthma, chronic back pain, who presents with worsening dyspnea on exertion for past 5 days or so.  Breathing worse with ambulation.  SOB associated with generalized weakness.  Per son she is normally very active but has been lacking energy last several days.  Pt recently treated for URI with zithromax and prednisone.     in ER: Given lasix, albuterol, aspirin 325mg and ceftriaxone for possible CHF exacerbation. (06 May 2017 15:11).    5/7: Seen at bedside, still winded on ambulation.  Comfortable at rest.  Denies fever, chills, N, V, abd pain, CP.  5/8: Pt still with dyspnea on exertion.  No fever, chills.  Has cough but non-productive.    5/9: Continued MARTIN.  s/p cath showing non-obstructive CAD.  CT revealed likely bronchitis/PNA.  Started on antibiotics.  5/10: Breathing/MARTIN improving per pt.  Feeling better on steroid therapy and antibiotics.  Denies fever, chills, N, V, abd pain, CP.  5/11:  Patient has no complaints. Would like to go home. O2 on ambulation 92%. No CP, dyspnea wheezing.         Assessment and Plan:   · Assessment		  83 year old woman with PMHx of asthma who presents with worsening dyspnea on exertion.    SOB/MARTIN: Likely multifactorial - asthma exacerbation in setting of bronchitis/pneumonia  -d/c tele - no significant events.  -xray official read --> Clear lungs however CT with bronchiolitis/mucous plugging/tree in bud opacities.  -d-dimer slightly elevated, v/q scan negative for PE.  -RVP negative  -Stop further metoprolol to prevent worsening bronchospam.  -pulm consult appreciated --> IV antibiotics and po steroids  -dulera  -fluticasone  -nebs  -O2 on Ambulation w/o oxygen was 92%  CAD :   -cath --> non-obstructive CAD, preserved EF.  -Troponins trending down.  Denies chest pain or discomfort.   -elevated BNP. s/p nitro patch in ER and lasix therapy x 3 days - hold further.   -c/w aspirin   -lipid panel - acceptable  -a1c 5.6  -f/u echo = preserved EF, no significant valvular abnormalities.    Hypertension: New onset, uncontrolled  -hold metoprolol to prevent bronchospam.   -start lisinopril 10mg and monitor    Severe protein calorie malnutrition:  -nutrition consult appreciated - increase protein, ensure.    -Card given for meals on wheels program.    Hyperthyroidism?: Borderline values.  -TSH borderline low with borderline high free T4  -Spoke with endocrine, and would like endo eval as outpatient for repeat studies.    hypokalemia: secondary to lasix: RESOLVED.  -replete and monitor  Chronic  back pain: STABLE  -fentanyl 50mcg patch q48hrs  GERD: STABLE  -ranitidine 150mg  Dvt ppx:  -lovenox    DISCHARGE INSTRUCTIONS/FOLLOW UP/PENDING LABS:  -D/C Patient home on Ceftin, Prednisone taper and albuterol; FU with Pulm Dr Griffin in 3-4 days; D/CMetoprolol due to DW of bronchospasm and started on lisinopril   -Abnormal TSH levels needs outpatient Endocrine evaluation to Rule out Hyperthyroidism  Message left for Dr Stone  Discussed with pulm  Total time: 45 minutes

## 2017-05-11 NOTE — DISCHARGE NOTE ADULT - OTHER SIGNIFICANT FINDINGS
Lab Results:                          13.6   7.0   )-----------( 393      ( 09 May 2017 06:41 )             42.2     05-09    146<H>  |  107  |  23  ----------------------------<  139<H>  3.7   |  31  |  0.62    Ca    9.3      09 May 2017 06:41    TPro  7.2  /  Alb  3.0<L>  /  TBili  0.3  /  DBili  x   /  AST  25  /  ALT  33  /  AlkPhos  101  05-09    CAPILLARY BLOOD GLUCOSE    LIVER FUNCTIONS - ( 09 May 2017 06:41 )  Alb: 3.0 g/dL / Pro: 7.2 gm/dL / ALK PHOS: 101 U/L / ALT: 33 U/L / AST: 25 U/L / GGT: x           MICROBIOLOGY/CULTURES:  Culture Results:   <10,000 CFU/ml Normal Urogenital sheryl present (05-07 @ 15:20)  Culture Results:   No growth to date. (05-06 @ 12:57)  Culture Results:   No growth to date. (05-06 @ 12:42)      RADIOLOGY RESULTS:    V/Q scan 5/10: IMPRESSION: Abnormal ventilation/perfusion lung scan.  Low probability of pulmonary embolus.    CT Chest:  IMPRESSION:     Mild right-sided bronchiolitis characterized by bronchial wall   thickening, distal airway mucous plugging, and tree-in-bud opacities.

## 2017-05-11 NOTE — DISCHARGE NOTE ADULT - MEDICATION SUMMARY - MEDICATIONS TO STOP TAKING
I will STOP taking the medications listed below when I get home from the hospital:  None I will STOP taking the medications listed below when I get home from the hospital:    Dulera 200 mcg-5 mcg/inh inhalation aerosol  -- 2 puff(s) inhaled 2 times a day

## 2017-05-11 NOTE — DISCHARGE NOTE ADULT - CARE PROVIDER_API CALL
Gela Griffin), Critical Care Medicine; Internal Medicine; Pulmonary Disease; Sleep Medicine  [address] [place] [address]  Phone: [phone]  Fax: [phone]    Dr Jordan Stone,   Phone: (   )    -  Fax: (   )    -

## 2017-05-11 NOTE — PROGRESS NOTE ADULT - SUBJECTIVE AND OBJECTIVE BOX
PCP: Dr. Rubio O/cordelia  Cardio: Dr. Hu  Pulm: Dr Griffin    Patient is a 83y old  Female who presents with a chief complaint of Difficulty breathing. (06 May 2017 15:11)    HPI:  83 year old woman with PMHx of asthma, chronic back pain, who presents with worsening dyspnea on exertion for past 5 days or so.  Breathing worse with ambulation.  SOB associated with generalized weakness.  Per son she is normally very active but has been lacking energy last several days.  Pt recently treated for URI with zithromax and prednisone.     in ER: Given lasix, albuterol, aspirin 325mg and ceftriaxone for possible CHF exacerbation. (06 May 2017 15:11).    5/7: Seen at bedside, still winded on ambulation.  Comfortable at rest.  Denies fever, chills, N, V, abd pain, CP.  5/8: Pt still with dyspnea on exertion.  No fever, chills.  Has cough but non-productive.    5/9: Continued MARTIN.  s/p cath showing non-obstructive CAD.  CT revealed likely bronchitis/PNA.  Started on antibiotics.  5/10: Breathing/MARTIN improving per pt.  Feeling better on steroid therapy and antibiotics.  Denies fever, chills, N, V, abd pain, CP.    Review of system- Rest of the review of system are normal except mentioned in HPI  Vital Signs Last 24 Hrs  T(C): 36.9, Max: 36.9 (05-11 @ 05:11)  T(F): 98.5, Max: 98.5 (05-11 @ 05:11)  HR: 79 (74 - 86)  BP: 121/67 (106/57 - 121/67)  BP(mean): --  RR: 18 (16 - 18)  SpO2: 91% (91% - 97%)    PHYSICAL EXAM:    Constitutional: NAD, awake and alert, frail, thin, anxious appearing  HEENT: PERR, EOMI, Normal Hearing, MMM  Neck: Soft and supple  Respiratory: Breath sounds are clear bilaterally, No wheezing, rales or rhonchi  Cardiovascular: S1 and S2, regular rate and rhythm, no Murmurs, gallops or rubs  Gastrointestinal: Bowel Sounds present, soft, nontender, nondistended, no guarding, no rebound  Extremities: No peripheral edema  Neurological: A/O x 3, no focal deficits  Skin: No rashes    MEDICATIONS  (STANDING):  buDESOnide 160 MICROgram(s)/formoterol 4.5 MICROgram(s) Inhaler 2Puff(s) Inhalation two times a day  enoxaparin Injectable 40milliGRAM(s) SubCutaneous every 24 hours  docusate sodium 100milliGRAM(s) Oral three times a day  aspirin  chewable 81milliGRAM(s) Oral daily  famotidine    Tablet 20milliGRAM(s) Oral at bedtime  fentaNYL   Patch  50 MICROgram(s)/Hr. 1Patch Transdermal every 48 hours  predniSONE   Tablet 40milliGRAM(s) Oral daily  guaiFENesin ER 600milliGRAM(s) Oral every 12 hours  cefTRIAXone   IVPB  IV Intermittent   ALBUTerol    0.083% 2.5milliGRAM(s) Nebulizer every 6 hours  cefTRIAXone   IVPB 1Gram(s) IV Intermittent every 24 hours    MEDICATIONS  (PRN):  ondansetron Injectable 4milliGRAM(s) IV Push every 6 hours PRN Nausea  zolpidem 5milliGRAM(s) Oral at bedtime PRN Insomnia    Lab Results:  CBC    .		Differential:	[] Automated		[] Manual  Chemistry        MICROBIOLOGY/CULTURES:  Culture Results:   <10,000 CFU/ml Normal Urogenital sheryl present (05-07 @ 15:20)  Culture Results:   No growth to date. (05-06 @ 12:57)  Culture Results:   No growth to date. (05-06 @ 12:42)      RADIOLOGY RESULTS:    V/Q scan 5/10: IMPRESSION: Abnormal ventilation/perfusion lung scan.  Low probability of pulmonary embolus.    CT Chest:  IMPRESSION:     Mild right-sided bronchiolitis characterized by bronchial wall   thickening, distal airway mucous plugging, and tree-in-bud opacities. PCP: Dr. Rubio O/cordelia  Cardio: Dr. Hu  Pulm: Dr Griffin    Patient is a 83y old  Female who presents with a chief complaint of Difficulty breathing. (06 May 2017 15:11)    HPI:  83 year old woman with PMHx of asthma, chronic back pain, who presents with worsening dyspnea on exertion for past 5 days or so.  Breathing worse with ambulation.  SOB associated with generalized weakness.  Per son she is normally very active but has been lacking energy last several days.  Pt recently treated for URI with zithromax and prednisone.     in ER: Given lasix, albuterol, aspirin 325mg and ceftriaxone for possible CHF exacerbation. (06 May 2017 15:11).    5/7: Seen at bedside, still winded on ambulation.  Comfortable at rest.  Denies fever, chills, N, V, abd pain, CP.  5/8: Pt still with dyspnea on exertion.  No fever, chills.  Has cough but non-productive.    5/9: Continued MARTIN.  s/p cath showing non-obstructive CAD.  CT revealed likely bronchitis/PNA.  Started on antibiotics.  5/10: Breathing/MARTIN improving per pt.  Feeling better on steroid therapy and antibiotics.  Denies fever, chills, N, V, abd pain, CP.  5/11: Patient has no complaitns. O2 on ambulation was 92% w/o Oxygen. No CP, wheezing or dyspnea. would like to go home     Review of system- Rest of the review of system are normal except mentioned in HPI  Vital Signs Last 24 Hrs  T(C): 36.9, Max: 36.9 (05-11 @ 05:11)  T(F): 98.5, Max: 98.5 (05-11 @ 05:11)  HR: 79 (74 - 86)  BP: 121/67 (106/57 - 121/67)  BP(mean): --  RR: 18 (16 - 18)  SpO2: 91% (91% - 97%)    PHYSICAL EXAM:    Constitutional: NAD, awake and alert, frail, thin, anxious appearing  HEENT: PERR, EOMI, Normal Hearing, MMM  Neck: Soft and supple  Respiratory: Breath sounds are clear bilaterally, No wheezing, rales or rhonchi  Cardiovascular: S1 and S2, regular rate and rhythm, no Murmurs, gallops or rubs  Gastrointestinal: Bowel Sounds present, soft, nontender, nondistended, no guarding, no rebound  Extremities: No peripheral edema  Neurological: A/O x 3, no focal deficits  Skin: No rashes    MEDICATIONS  (STANDING):  buDESOnide 160 MICROgram(s)/formoterol 4.5 MICROgram(s) Inhaler 2Puff(s) Inhalation two times a day  enoxaparin Injectable 40milliGRAM(s) SubCutaneous every 24 hours  docusate sodium 100milliGRAM(s) Oral three times a day  aspirin  chewable 81milliGRAM(s) Oral daily  famotidine    Tablet 20milliGRAM(s) Oral at bedtime  fentaNYL   Patch  50 MICROgram(s)/Hr. 1Patch Transdermal every 48 hours  predniSONE   Tablet 40milliGRAM(s) Oral daily  guaiFENesin ER 600milliGRAM(s) Oral every 12 hours  cefTRIAXone   IVPB  IV Intermittent   ALBUTerol    0.083% 2.5milliGRAM(s) Nebulizer every 6 hours  cefTRIAXone   IVPB 1Gram(s) IV Intermittent every 24 hours    MEDICATIONS  (PRN):  ondansetron Injectable 4milliGRAM(s) IV Push every 6 hours PRN Nausea  zolpidem 5milliGRAM(s) Oral at bedtime PRN Insomnia    Lab Results:  CBC    .		Differential:	[] Automated		[] Manual  Chemistry        MICROBIOLOGY/CULTURES:  Culture Results:   <10,000 CFU/ml Normal Urogenital sheryl present (05-07 @ 15:20)  Culture Results:   No growth to date. (05-06 @ 12:57)  Culture Results:   No growth to date. (05-06 @ 12:42)      RADIOLOGY RESULTS:    V/Q scan 5/10: IMPRESSION: Abnormal ventilation/perfusion lung scan.  Low probability of pulmonary embolus.    CT Chest:  IMPRESSION:     Mild right-sided bronchiolitis characterized by bronchial wall   thickening, distal airway mucous plugging, and tree-in-bud opacities.

## 2017-05-11 NOTE — PROGRESS NOTE ADULT - PROVIDER SPECIALTY LIST ADULT
Cardiology
Hospitalist
Pulmonology
Hospitalist

## 2017-05-11 NOTE — DISCHARGE NOTE ADULT - PLAN OF CARE
asthma exacerbation in setting of bronchitis/pneumonia D/C Patient home on Ceftin, Prednisone taper and albuterol; FU with Pulm Dr Griffin in 3-4 days; D/C Metoprolol due to side effect of bronchospasm and started on lisinopril Abnormal TSH levels needs outpatient Endocrine evaluation to Rule out Hyperthyroidism Hypertension -hold metoprolol to prevent bronchospam.   -start lisinopril 10mg and monitor BP with PCP

## 2017-05-11 NOTE — DISCHARGE NOTE ADULT - PATIENT PORTAL LINK FT
“You can access the [facility] Patient Portal, offered by [facility], by registering with the following website: [email or web address]”

## 2017-05-11 NOTE — PROGRESS NOTE ADULT - SUBJECTIVE AND OBJECTIVE BOX
TK JEFFREY  MRN: [id number]    S: : Chart reviewed. Patient is presently comfortable. No c/o dyspnea. No chest pain.  Feels slightly anxious. Hopes to go home today.     PAST MEDICAL & SURGICAL HISTORY:  IBS (irritable bowel syndrome)  Chronic back pain  Asthma  Fracture of left wrist, closed, initial encounter: s/p repair x 2  Closed fracture of right femur, unspecified fracture morphology, unspecified portion of femur, initial encounter   delivery delivered: x 2  Bakers cyst  S/P cataract surgery: left  History of back surgery      O: T(C): 36.9, Max: 36.9 (05-11 @ 05:11)  HR: 74 (74 - 86)  BP: 121/67 (106/57 - 121/67)  RR: 18 (16 - 18)  SpO2: 91% (91% - 97%)  Wt(kg): --    PHYSICAL EXAM:      GENERAL: comfortable    NEURO: awake/alert    NECK: no JVD    CHEST: clear    CARDIAC:    EXT: no edema      RADIOLOGY:    EXAM:  NM PULM VENTILATION PERFUS IMG                        PROCEDURE DATE:  05/10/2017        INTERPRETATION:  CLINICAL INFORMATION: 83-year-old female with history of   asthma, presents with and cough, fatigue and chest and back pain,   evaluate for pulmonary embolism.    RADIOPHARMACEUTICAL: 1 mCi Tc-99m-DTPA by inhalation; 6.4 mCi Tc-99m-MAA,   I.V.    TECHNIQUE:  Ventilation and perfusion images of the lungs were obtained   following administration of Tc-99m-DTPA and Tc-99m-MAA. Images were   obtained in the anterior, posterior, both lateral, and all 4 oblique   projections. This study was interpreted in conjunction with a chest   radiograph of 5/10/2017.    COMPARISON: No prior lung V/Q scan available.     FINDINGS: There is markedly irregular radiotracer distribution in both   lungs with multiple matched ventilation and perfusion defects without   corresponding opacity on contemporaneous chest x-ray.     IMPRESSION: Abnormal ventilation/perfusion lung scan.    Low probability of pulmonary embolus.          PROCEDURE DATE:  2017   INTERPRETATION:  CT CHEST    HISTORY:  Dyspnea on exertion                     TECHNIQUE: Noncontrast CT of the chest was performed. Coronal and   sagittal images were reconstructed. This study was performed using   automatic exposure control (radiation dose reduction software) to obtain   a diagnostic image quality scan with patient dose as low as reasonably   achievable.    COMPARISON: Chest x-ray 2017    FINDINGS:    LUNGS, AIRWAYS: The central airways are patent. Small areas of distal   airway mucous plugging are noted in the right middle lobe. Mild   groundglass opacities and tree-in-bud opacities in the right lung.    PLEURA: No pleural abnormality.    HEART AND VESSELS: Normal heart size. No pericardial effusion. Minimal   coronary artery calcification. Ectasia of the ascending thoracic aorta.    MEDIASTINUM AND DION: No adenopathy.    UPPER ABDOMEN: Limited visualization is unremarkable.    BONES AND SOFT TISSUES: No aggressive lesion.    IMPRESSION:     Mild right-sided bronchiolitis characterized by bronchial wall   thickening, distal airway mucous plugging, and tree-in-bud opacities.        Comprehensive Metabolic Panel in AM (17 @ 06:41)    Sodium, Serum: 146 mmol/L    Potassium, Serum: 3.7 mmol/L    Chloride, Serum: 107 mmol/L    Carbon Dioxide, Serum: 31 mmol/L    Anion Gap, Serum: 8 mmol/L    Blood Urea Nitrogen, Serum: 23 mg/dL    Creatinine, Serum: 0.62 mg/dL    Glucose, Serum: 139 mg/dL    Calcium, Total Serum: 9.3 mg/dL    Protein Total, Serum: 7.2 gm/dL    Albumin, Serum: 3.0 g/dL    Bilirubin Total, Serum: 0.3 mg/dL    Alkaline Phosphatase, Serum: 101 U/L    Aspartate Aminotransferase (AST/SGOT): 25 U/L    Alanine Aminotransferase (ALT/SGPT): 33 U/L      Complete Blood Count in AM (17 @ 06:41)    WBC Count: 7.0 K/uL    RBC Count: 4.70 M/uL    Hemoglobin: 13.6 g/dL    Hematocrit: 42.2 %    Mean Cell Volume: 89.7 fl    Mean Cell Hemoglobin: 29.1 pg    Mean Cell Hemoglobin Conc: 32.4 gm/dL    Red Cell Distrib Width: 12.7 %    Platelet Count - Automated: 393 K/uL      MEDICATIONS  (STANDING):  buDESOnide 160 MICROgram(s)/formoterol 4.5 MICROgram(s) Inhaler 2Puff(s) Inhalation two times a day  enoxaparin Injectable 40milliGRAM(s) SubCutaneous every 24 hours  docusate sodium 100milliGRAM(s) Oral three times a day  aspirin  chewable 81milliGRAM(s) Oral daily  famotidine    Tablet 20milliGRAM(s) Oral at bedtime  fentaNYL   Patch  50 MICROgram(s)/Hr. 1Patch Transdermal every 48 hours  predniSONE   Tablet 40milliGRAM(s) Oral daily  guaiFENesin ER 600milliGRAM(s) Oral every 12 hours  cefTRIAXone   IVPB  IV Intermittent   ALBUTerol    0.083% 2.5milliGRAM(s) Nebulizer every 6 hours  cefTRIAXone   IVPB 1Gram(s) IV Intermittent every 24 hours  lisinopril 10milliGRAM(s) Oral daily    MEDICATIONS  (PRN):  ondansetron Injectable 4milliGRAM(s) IV Push every 6 hours PRN Nausea  zolpidem 5milliGRAM(s) Oral at bedtime PRN Insomnia        A/P: 1. Asthma exacerbation. Taper prednisone as outpatient. Continue inhaled steroids/bronchodilators (e.g. Symbicort after discharge) . Outpatient follow up with Dr. Griffin.     2. Elevated d-dimer. Non-specific. Low probability for PE on V/Q scan. On DVT prophylaxis.      3. HTN. Per hospitalist. On lisinoprol.     4. Non obstructive CAD. Cardiology follow up.     5. Discharge planning per hospitalist.

## 2017-05-11 NOTE — DISCHARGE NOTE ADULT - MEDICATION SUMMARY - MEDICATIONS TO TAKE
I will START or STAY ON the medications listed below when I get home from the hospital:    predniSONE 10 mg oral tablet  -- 3 tab PO QD X 3 days 2 tab PO QD X 3 days 1 tab PO QD X 3 days MDD:3 tabs  -- It is very important that you take or use this exactly as directed.  Do not skip doses or discontinue unless directed by your doctor.  Obtain medical advice before taking any non-prescription drugs as some may affect the action of this medication.  Take with food or milk.    -- Indication: For Asthma/PNA    lisinopril 10 mg oral tablet  -- 1 tab(s) by mouth once a day  -- Indication: For hypertension - New Medication; STOP metoprolol    gabapentin 300 mg oral capsule  -- 2 cap(s) by mouth once a day (at bedtime), As Needed for back pain  -- Indication: For neuropathy    temazepam 30 mg oral capsule  -- 1 cap(s) by mouth once a day (at bedtime)  -- Indication: For Anxiety, insomia    ProAir HFA 90 mcg/inh inhalation aerosol  -- 2 puff(s) inhaled 4 times a day, As Needed sob  -- Indication: For Asthma    Serevent Diskus 50 mcg inhalation powder  -- 1 puff(s) inhaled 2 times a day  -- Indication: For Asthma    albuterol 90 mcg/inh inhalation aerosol  -- 2 puff(s) inhaled 4 times a day, As Needed -for shortness of breath and/or wheezing  -- For inhalation only.  It is very important that you take or use this exactly as directed.  Do not skip doses or discontinue unless directed by your doctor.  Obtain medical advice before taking any non-prescription drugs as some may affect the action of this medication.  Shake well before use.    -- Indication: For Asthma    Dulera 200 mcg-5 mcg/inh inhalation aerosol  -- 2 puff(s) inhaled 2 times a day  -- Indication: For Ashma    Ceftin 500 mg oral tablet  -- 1 tab(s) by mouth 2 times a day  -- Finish all this medication unless otherwise directed by prescriber.  Medication should be taken with plenty of water.  Take with food or milk.    -- Indication: For PNA    raNITIdine 150 mg oral capsule  -- 1 cap(s) by mouth 2 times a day  -- Indication: For gerd    fluticasone 50 mcg/inh nasal spray  -- 1 spray(s) into nose 2 times a day  -- Indication: For nasal spray    Lumigan 0.01% ophthalmic solution  -- 1 drop(s) to each affected eye once a day (in the evening)  -- Indication: For eye drops    Flovent  mcg/inh inhalation aerosol  -- 2 puff(s) inhaled 2 times a day  -- Indication: For Asthma I will START or STAY ON the medications listed below when I get home from the hospital:    predniSONE 10 mg oral tablet  -- 3 tab PO QD X 3 days 2 tab PO QD X 3 days 1 tab PO QD X 3 days MDD:3 tabs  -- It is very important that you take or use this exactly as directed.  Do not skip doses or discontinue unless directed by your doctor.  Obtain medical advice before taking any non-prescription drugs as some may affect the action of this medication.  Take with food or milk.    -- Indication: For Asthma/PNA    aspirin 81 mg oral tablet, chewable  -- 1 tab(s) by mouth once a day  -- Indication: For Cad    lisinopril 10 mg oral tablet  -- 1 tab(s) by mouth once a day  -- Indication: For hypertension - New Medication; STOP metoprolol    gabapentin 300 mg oral capsule  -- 2 cap(s) by mouth once a day (at bedtime), As Needed for back pain  -- Indication: For neuropathy    temazepam 30 mg oral capsule  -- 1 cap(s) by mouth once a day (at bedtime)  -- Indication: For Anxiety, insomia    ProAir HFA 90 mcg/inh inhalation aerosol  -- 2 puff(s) inhaled 4 times a day, As Needed sob  -- Indication: For Asthma    Serevent Diskus 50 mcg inhalation powder  -- 1 puff(s) inhaled 2 times a day  -- Indication: For Asthma    albuterol 90 mcg/inh inhalation aerosol  -- 2 puff(s) inhaled 4 times a day, As Needed -for shortness of breath and/or wheezing  -- For inhalation only.  It is very important that you take or use this exactly as directed.  Do not skip doses or discontinue unless directed by your doctor.  Obtain medical advice before taking any non-prescription drugs as some may affect the action of this medication.  Shake well before use.    -- Indication: For Asthma    Ceftin 500 mg oral tablet  -- 1 tab(s) by mouth 2 times a day  -- Finish all this medication unless otherwise directed by prescriber.  Medication should be taken with plenty of water.  Take with food or milk.    -- Indication: For PNA    raNITIdine 150 mg oral capsule  -- 1 cap(s) by mouth 2 times a day  -- Indication: For gerd    fluticasone 50 mcg/inh nasal spray  -- 1 spray(s) into nose 2 times a day  -- Indication: For nasal spray    Lumigan 0.01% ophthalmic solution  -- 1 drop(s) to each affected eye once a day (in the evening)  -- Indication: For eye drops    Flovent  mcg/inh inhalation aerosol  -- 2 puff(s) inhaled 2 times a day  -- Indication: For Asthma

## 2017-05-11 NOTE — DISCHARGE NOTE ADULT - CARE PLAN
Principal Discharge DX:	Asthma with acute exacerbation, unspecified asthma severity  Goal:	asthma exacerbation in setting of bronchitis/pneumonia  Instructions for follow-up, activity and diet:	D/C Patient home on Ceftin, Prednisone taper and albuterol; FU with Pulm Dr Griffin in 3-4 days; D/C Metoprolol due to side effect of bronchospasm and started on lisinopril  Goal:	Abnormal TSH levels needs outpatient Endocrine evaluation to Rule out Hyperthyroidism  Instructions for follow-up, activity and diet:	Abnormal TSH levels needs outpatient Endocrine evaluation to Rule out Hyperthyroidism  Goal:	Hypertension  Instructions for follow-up, activity and diet:	-hold metoprolol to prevent bronchospam.   -start lisinopril 10mg and monitor BP with PCP

## 2017-05-11 NOTE — DISCHARGE NOTE ADULT - ADDITIONAL INSTRUCTIONS
DISCHARGE INSTRUCTIONS/FOLLOW UP/PENDING LABS:  -D/C Patient home on Ceftin, Prednisone taper and albuterol; FU with Pulm Dr Griffin in 3-4 days; D/CMetoprolol due to DW of bronchospasm and started on lisinopril   -Abnormal TSH levels needs outpatient Endocrine evaluation to Rule out Hyperthyroidism

## 2017-05-15 DIAGNOSIS — R06.00 DYSPNEA, UNSPECIFIED: ICD-10-CM

## 2017-05-15 DIAGNOSIS — Z88.5 ALLERGY STATUS TO NARCOTIC AGENT: ICD-10-CM

## 2017-05-15 DIAGNOSIS — E87.6 HYPOKALEMIA: ICD-10-CM

## 2017-05-15 DIAGNOSIS — G89.29 OTHER CHRONIC PAIN: ICD-10-CM

## 2017-05-15 DIAGNOSIS — J45.901 UNSPECIFIED ASTHMA WITH (ACUTE) EXACERBATION: ICD-10-CM

## 2017-05-15 DIAGNOSIS — Z79.899 OTHER LONG TERM (CURRENT) DRUG THERAPY: ICD-10-CM

## 2017-05-15 DIAGNOSIS — I50.9 HEART FAILURE, UNSPECIFIED: ICD-10-CM

## 2017-05-15 DIAGNOSIS — E05.90 THYROTOXICOSIS, UNSPECIFIED WITHOUT THYROTOXIC CRISIS OR STORM: ICD-10-CM

## 2017-05-15 DIAGNOSIS — E43 UNSPECIFIED SEVERE PROTEIN-CALORIE MALNUTRITION: ICD-10-CM

## 2017-05-15 DIAGNOSIS — M54.9 DORSALGIA, UNSPECIFIED: ICD-10-CM

## 2017-05-15 DIAGNOSIS — I11.0 HYPERTENSIVE HEART DISEASE WITH HEART FAILURE: ICD-10-CM

## 2017-05-15 DIAGNOSIS — J18.9 PNEUMONIA, UNSPECIFIED ORGANISM: ICD-10-CM

## 2017-05-15 DIAGNOSIS — I25.10 ATHEROSCLEROTIC HEART DISEASE OF NATIVE CORONARY ARTERY WITHOUT ANGINA PECTORIS: ICD-10-CM

## 2017-06-19 ENCOUNTER — EMERGENCY (EMERGENCY)
Facility: HOSPITAL | Age: 82
LOS: 0 days | Discharge: HOME | End: 2017-06-19
Attending: EMERGENCY MEDICINE | Admitting: EMERGENCY MEDICINE
Payer: MEDICARE

## 2017-06-19 VITALS
DIASTOLIC BLOOD PRESSURE: 75 MMHG | OXYGEN SATURATION: 95 % | HEART RATE: 79 BPM | TEMPERATURE: 98 F | SYSTOLIC BLOOD PRESSURE: 126 MMHG | RESPIRATION RATE: 15 BRPM | WEIGHT: 89.95 LBS

## 2017-06-19 DIAGNOSIS — R05 COUGH: ICD-10-CM

## 2017-06-19 DIAGNOSIS — K58.9 IRRITABLE BOWEL SYNDROME WITHOUT DIARRHEA: ICD-10-CM

## 2017-06-19 DIAGNOSIS — J45.909 UNSPECIFIED ASTHMA, UNCOMPLICATED: ICD-10-CM

## 2017-06-19 DIAGNOSIS — Z98.890 OTHER SPECIFIED POSTPROCEDURAL STATES: ICD-10-CM

## 2017-06-19 DIAGNOSIS — S72.91XA UNSPECIFIED FRACTURE OF RIGHT FEMUR, INITIAL ENCOUNTER FOR CLOSED FRACTURE: Chronic | ICD-10-CM

## 2017-06-19 DIAGNOSIS — Z98.49 CATARACT EXTRACTION STATUS, UNSPECIFIED EYE: ICD-10-CM

## 2017-06-19 DIAGNOSIS — S62.102A FRACTURE OF UNSPECIFIED CARPAL BONE, LEFT WRIST, INITIAL ENCOUNTER FOR CLOSED FRACTURE: Chronic | ICD-10-CM

## 2017-06-19 LAB
ALBUMIN SERPL ELPH-MCNC: 3.6 G/DL — SIGNIFICANT CHANGE UP (ref 3.3–5)
ALP SERPL-CCNC: 104 U/L — SIGNIFICANT CHANGE UP (ref 40–120)
ALT FLD-CCNC: 21 U/L — SIGNIFICANT CHANGE UP (ref 12–78)
ANION GAP SERPL CALC-SCNC: 3 MMOL/L — LOW (ref 5–17)
APPEARANCE UR: CLEAR — SIGNIFICANT CHANGE UP
APTT BLD: 34.2 SEC — SIGNIFICANT CHANGE UP (ref 27.5–37.4)
AST SERPL-CCNC: 16 U/L — SIGNIFICANT CHANGE UP (ref 15–37)
BACTERIA # UR AUTO: (no result)
BASOPHILS # BLD AUTO: 0.1 K/UL — SIGNIFICANT CHANGE UP (ref 0–0.2)
BASOPHILS NFR BLD AUTO: 1.9 % — SIGNIFICANT CHANGE UP (ref 0–2)
BILIRUB SERPL-MCNC: 0.3 MG/DL — SIGNIFICANT CHANGE UP (ref 0.2–1.2)
BILIRUB UR-MCNC: NEGATIVE — SIGNIFICANT CHANGE UP
BUN SERPL-MCNC: 13 MG/DL — SIGNIFICANT CHANGE UP (ref 7–23)
CALCIUM SERPL-MCNC: 9.3 MG/DL — SIGNIFICANT CHANGE UP (ref 8.5–10.1)
CHLORIDE SERPL-SCNC: 108 MMOL/L — SIGNIFICANT CHANGE UP (ref 96–108)
CO2 SERPL-SCNC: 31 MMOL/L — SIGNIFICANT CHANGE UP (ref 22–31)
COLOR SPEC: YELLOW — SIGNIFICANT CHANGE UP
CREAT SERPL-MCNC: 0.68 MG/DL — SIGNIFICANT CHANGE UP (ref 0.5–1.3)
DIFF PNL FLD: (no result)
EOSINOPHIL # BLD AUTO: 0.1 K/UL — SIGNIFICANT CHANGE UP (ref 0–0.5)
EOSINOPHIL NFR BLD AUTO: 1.3 % — SIGNIFICANT CHANGE UP (ref 0–6)
EPI CELLS # UR: SIGNIFICANT CHANGE UP
GLUCOSE SERPL-MCNC: 99 MG/DL — SIGNIFICANT CHANGE UP (ref 70–99)
GLUCOSE UR QL: NEGATIVE MG/DL — SIGNIFICANT CHANGE UP
HCT VFR BLD CALC: 40.3 % — SIGNIFICANT CHANGE UP (ref 34.5–45)
HGB BLD-MCNC: 13.3 G/DL — SIGNIFICANT CHANGE UP (ref 11.5–15.5)
INR BLD: 0.93 RATIO — SIGNIFICANT CHANGE UP (ref 0.88–1.16)
KETONES UR-MCNC: NEGATIVE — SIGNIFICANT CHANGE UP
LEUKOCYTE ESTERASE UR-ACNC: (no result)
LYMPHOCYTES # BLD AUTO: 1.6 K/UL — SIGNIFICANT CHANGE UP (ref 1–3.3)
LYMPHOCYTES # BLD AUTO: 26.4 % — SIGNIFICANT CHANGE UP (ref 13–44)
MCHC RBC-ENTMCNC: 30.6 PG — SIGNIFICANT CHANGE UP (ref 27–34)
MCHC RBC-ENTMCNC: 32.9 GM/DL — SIGNIFICANT CHANGE UP (ref 32–36)
MCV RBC AUTO: 92.8 FL — SIGNIFICANT CHANGE UP (ref 80–100)
MONOCYTES # BLD AUTO: 0.6 K/UL — SIGNIFICANT CHANGE UP (ref 0–0.9)
MONOCYTES NFR BLD AUTO: 10 % — SIGNIFICANT CHANGE UP (ref 2–14)
NEUTROPHILS # BLD AUTO: 3.5 K/UL — SIGNIFICANT CHANGE UP (ref 1.8–7.4)
NEUTROPHILS NFR BLD AUTO: 60.3 % — SIGNIFICANT CHANGE UP (ref 43–77)
NITRITE UR-MCNC: NEGATIVE — SIGNIFICANT CHANGE UP
PH UR: 8 — SIGNIFICANT CHANGE UP (ref 5–8)
PLATELET # BLD AUTO: 446 K/UL — HIGH (ref 150–400)
POTASSIUM SERPL-MCNC: 4.3 MMOL/L — SIGNIFICANT CHANGE UP (ref 3.5–5.3)
POTASSIUM SERPL-SCNC: 4.3 MMOL/L — SIGNIFICANT CHANGE UP (ref 3.5–5.3)
PROT SERPL-MCNC: 7 GM/DL — SIGNIFICANT CHANGE UP (ref 6–8.3)
PROT UR-MCNC: NEGATIVE MG/DL — SIGNIFICANT CHANGE UP
PROTHROM AB SERPL-ACNC: 10 SEC — SIGNIFICANT CHANGE UP (ref 9.8–12.7)
RBC # BLD: 4.34 M/UL — SIGNIFICANT CHANGE UP (ref 3.8–5.2)
RBC # FLD: 13.3 % — SIGNIFICANT CHANGE UP (ref 10.3–14.5)
RBC CASTS # UR COMP ASSIST: (no result) /HPF (ref 0–4)
SODIUM SERPL-SCNC: 142 MMOL/L — SIGNIFICANT CHANGE UP (ref 135–145)
SP GR SPEC: 1.01 — SIGNIFICANT CHANGE UP (ref 1.01–1.02)
UROBILINOGEN FLD QL: NEGATIVE MG/DL — SIGNIFICANT CHANGE UP
WBC # BLD: 5.9 K/UL — SIGNIFICANT CHANGE UP (ref 3.8–10.5)
WBC # FLD AUTO: 5.9 K/UL — SIGNIFICANT CHANGE UP (ref 3.8–10.5)
WBC UR QL: SIGNIFICANT CHANGE UP

## 2017-06-19 PROCEDURE — 99283 EMERGENCY DEPT VISIT LOW MDM: CPT

## 2017-06-19 PROCEDURE — 71020: CPT | Mod: 26

## 2017-06-19 NOTE — ED STATDOCS - PROGRESS NOTE DETAILS
TIGRE Myles: Patient has been seen, evaluated and orders have been written by the attending in intake. Patient is stable.  I will follow up the results of orders written and I will continue to evaluate/observe the patient. TIGRE Myles: pt eager to home home. pt and family aware of all results and urine still pending collection.

## 2017-06-19 NOTE — ED STATDOCS - OBJECTIVE STATEMENT
84 y/o F with a h/o chronic back pain, c/o chills and feeling hot over the passed few weeks. Pt went to PCP, had negative thyroid test. Last month, pt had PNA. 82 y/o F with a h/o chronic back pain, c/o chills and feeling hot over the passed few weeks, +dry cough. Pt went to PCP, had negative thyroid test and was also told the cough was from reflux. Last month, pt had PNA, was discharged on 5/11. 84 y/o F with a h/o chronic back pain, c/o chills and feeling hot over the passed few weeks, +dry cough. Pt went to PCP, had negative thyroid test and was also told the cough was from reflux. Last month, pt had PNA, was discharged on 5/11. She states her fentanyl patch dose was changed a few weeks ago and is concerning she is withdrawing.

## 2017-06-20 LAB
CULTURE RESULTS: SIGNIFICANT CHANGE UP
SPECIMEN SOURCE: SIGNIFICANT CHANGE UP

## 2018-11-28 ENCOUNTER — EMERGENCY (EMERGENCY)
Facility: HOSPITAL | Age: 83
LOS: 1 days | End: 2018-11-28
Attending: EMERGENCY MEDICINE | Admitting: EMERGENCY MEDICINE
Payer: MEDICARE

## 2018-11-28 VITALS
TEMPERATURE: 98 F | HEART RATE: 115 BPM | RESPIRATION RATE: 20 BRPM | WEIGHT: 100.09 LBS | HEIGHT: 61 IN | OXYGEN SATURATION: 94 % | SYSTOLIC BLOOD PRESSURE: 160 MMHG | DIASTOLIC BLOOD PRESSURE: 92 MMHG

## 2018-11-28 VITALS
HEART RATE: 84 BPM | TEMPERATURE: 98 F | RESPIRATION RATE: 18 BRPM | DIASTOLIC BLOOD PRESSURE: 88 MMHG | SYSTOLIC BLOOD PRESSURE: 143 MMHG | OXYGEN SATURATION: 97 %

## 2018-11-28 DIAGNOSIS — S72.91XA UNSPECIFIED FRACTURE OF RIGHT FEMUR, INITIAL ENCOUNTER FOR CLOSED FRACTURE: Chronic | ICD-10-CM

## 2018-11-28 DIAGNOSIS — S62.102A FRACTURE OF UNSPECIFIED CARPAL BONE, LEFT WRIST, INITIAL ENCOUNTER FOR CLOSED FRACTURE: Chronic | ICD-10-CM

## 2018-11-28 LAB
ANION GAP SERPL CALC-SCNC: 5 MMOL/L — SIGNIFICANT CHANGE UP (ref 5–17)
BASOPHILS # BLD AUTO: 0.03 K/UL — SIGNIFICANT CHANGE UP (ref 0–0.2)
BASOPHILS NFR BLD AUTO: 0.6 % — SIGNIFICANT CHANGE UP (ref 0–2)
BUN SERPL-MCNC: 16 MG/DL — SIGNIFICANT CHANGE UP (ref 7–23)
CALCIUM SERPL-MCNC: 9.4 MG/DL — SIGNIFICANT CHANGE UP (ref 8.5–10.1)
CHLORIDE SERPL-SCNC: 109 MMOL/L — HIGH (ref 96–108)
CO2 SERPL-SCNC: 29 MMOL/L — SIGNIFICANT CHANGE UP (ref 22–31)
CREAT SERPL-MCNC: 0.6 MG/DL — SIGNIFICANT CHANGE UP (ref 0.5–1.3)
EOSINOPHIL # BLD AUTO: 0.11 K/UL — SIGNIFICANT CHANGE UP (ref 0–0.5)
EOSINOPHIL NFR BLD AUTO: 2.2 % — SIGNIFICANT CHANGE UP (ref 0–6)
GLUCOSE SERPL-MCNC: 97 MG/DL — SIGNIFICANT CHANGE UP (ref 70–99)
HCT VFR BLD CALC: 38.9 % — SIGNIFICANT CHANGE UP (ref 34.5–45)
HGB BLD-MCNC: 12.7 G/DL — SIGNIFICANT CHANGE UP (ref 11.5–15.5)
IMM GRANULOCYTES NFR BLD AUTO: 0.2 % — SIGNIFICANT CHANGE UP (ref 0–1.5)
LYMPHOCYTES # BLD AUTO: 1.13 K/UL — SIGNIFICANT CHANGE UP (ref 1–3.3)
LYMPHOCYTES # BLD AUTO: 22.7 % — SIGNIFICANT CHANGE UP (ref 13–44)
MCHC RBC-ENTMCNC: 29.7 PG — SIGNIFICANT CHANGE UP (ref 27–34)
MCHC RBC-ENTMCNC: 32.6 GM/DL — SIGNIFICANT CHANGE UP (ref 32–36)
MCV RBC AUTO: 90.9 FL — SIGNIFICANT CHANGE UP (ref 80–100)
MONOCYTES # BLD AUTO: 0.54 K/UL — SIGNIFICANT CHANGE UP (ref 0–0.9)
MONOCYTES NFR BLD AUTO: 10.9 % — SIGNIFICANT CHANGE UP (ref 2–14)
NEUTROPHILS # BLD AUTO: 3.15 K/UL — SIGNIFICANT CHANGE UP (ref 1.8–7.4)
NEUTROPHILS NFR BLD AUTO: 63.4 % — SIGNIFICANT CHANGE UP (ref 43–77)
NRBC # BLD: 0 /100 WBCS — SIGNIFICANT CHANGE UP (ref 0–0)
PLATELET # BLD AUTO: 267 K/UL — SIGNIFICANT CHANGE UP (ref 150–400)
POTASSIUM SERPL-MCNC: 4.1 MMOL/L — SIGNIFICANT CHANGE UP (ref 3.5–5.3)
POTASSIUM SERPL-SCNC: 4.1 MMOL/L — SIGNIFICANT CHANGE UP (ref 3.5–5.3)
RBC # BLD: 4.28 M/UL — SIGNIFICANT CHANGE UP (ref 3.8–5.2)
RBC # FLD: 13.2 % — SIGNIFICANT CHANGE UP (ref 10.3–14.5)
SODIUM SERPL-SCNC: 143 MMOL/L — SIGNIFICANT CHANGE UP (ref 135–145)
WBC # BLD: 4.97 K/UL — SIGNIFICANT CHANGE UP (ref 3.8–10.5)
WBC # FLD AUTO: 4.97 K/UL — SIGNIFICANT CHANGE UP (ref 3.8–10.5)

## 2018-11-28 PROCEDURE — 71250 CT THORAX DX C-: CPT | Mod: 26

## 2018-11-28 PROCEDURE — 99285 EMERGENCY DEPT VISIT HI MDM: CPT

## 2018-11-28 RX ORDER — SODIUM CHLORIDE 9 MG/ML
1000 INJECTION INTRAMUSCULAR; INTRAVENOUS; SUBCUTANEOUS ONCE
Qty: 0 | Refills: 0 | Status: COMPLETED | OUTPATIENT
Start: 2018-11-28 | End: 2018-11-28

## 2018-11-28 RX ORDER — DIPHENHYDRAMINE HCL 50 MG
25 CAPSULE ORAL ONCE
Qty: 0 | Refills: 0 | Status: COMPLETED | OUTPATIENT
Start: 2018-11-28 | End: 2018-11-28

## 2018-11-28 RX ADMIN — Medication 25 MILLIGRAM(S): at 20:38

## 2018-11-28 RX ADMIN — SODIUM CHLORIDE 1000 MILLILITER(S): 9 INJECTION INTRAMUSCULAR; INTRAVENOUS; SUBCUTANEOUS at 20:38

## 2018-11-28 RX ADMIN — Medication 125 MILLIGRAM(S): at 20:37

## 2018-11-28 NOTE — ED ADULT NURSE NOTE - BREATH SOUNDS, MLM
Clear
DISPOSITION/HISTORY OF PRESENT ILLNESS/HIV/REVIEW OF SYSTEMS/VITAL SIGNS( Pullset)/PAST MEDICAL/SURGICAL/SOCIAL HISTORY/PHYSICAL EXAM

## 2018-11-28 NOTE — ED ADULT NURSE NOTE - CHPI ED NUR SYMPTOMS NEG
no cough/no headache/no body aches/no wheezing/no edema/no chills/no hemoptysis/no chest pain/no diaphoresis/no fever

## 2018-11-28 NOTE — ED PROVIDER NOTE - NS_ ATTENDINGSCRIBEDETAILS _ED_A_ED_FT
I, Phill Tompkins MD,  performed the initial face to face bedside interview with this patient regarding history of present illness, review of symptoms and relevant past medical, social and family history.  I completed an independent physical examination.    The history, relevant review of systems, past medical and surgical history, medical decision making, and physical examination was documented by the scribe in my presence and I attest to the accuracy of the documentation.

## 2018-11-28 NOTE — ED ADULT NURSE NOTE - OBJECTIVE STATEMENT
Patient complaining of shortness of breath, bilateral arm "burning" & itching. Patient states that 3 weeks ago, she had the shingles vaccine and "had an allergic reaction" to the vaccine. Patient has been taking PO steroids at home. Patient denies any chest pain, dizziness at this time. No rashes noted.

## 2018-11-28 NOTE — ED PROVIDER NOTE - OBJECTIVE STATEMENT
85 y/o female with a PMHx of asthma, chronic back pain, walking PNA, IBS presents to the ED c/o rash and a burning sensation in B/L UE x3 weeks. Pt states she experienced a sudden onset of symptoms 2 weeks ago, after being given a shingles shot. Symptoms have gradually worsened since onset. Pt states she then saw her PCP and was given prednisone and acyclovir as well as an anti-viral medication. Pt states she was advised to come to the ED after seeing PCP recently. +Chills to B/L arms after onset of burning sensation. Pt states she is concerned about SOB due to hx of walking PNA. Pt states her CXR, taken at PCP came back normal, but that he refused to give her more medication due to current symptoms.  PCP: Dr. NICE

## 2018-11-28 NOTE — ED ADULT NURSE NOTE - NSIMPLEMENTINTERV_GEN_ALL_ED
Implemented All Fall Risk Interventions:  [place] to call system. Call bell, personal items and telephone within reach. Instruct patient to call for assistance. Room bathroom lighting operational. Non-slip footwear when patient is off stretcher. Physically safe environment: no spills, clutter or unnecessary equipment. Stretcher in lowest position, wheels locked, appropriate side rails in place. Provide visual cue, wrist band, yellow gown, etc. Monitor gait and stability. Monitor for mental status changes and reorient to person, place, and time. Review medications for side effects contributing to fall risk. Reinforce activity limits and safety measures with patient and family.

## 2018-11-29 DIAGNOSIS — J18.9 PNEUMONIA, UNSPECIFIED ORGANISM: ICD-10-CM

## 2018-11-29 DIAGNOSIS — G89.29 OTHER CHRONIC PAIN: ICD-10-CM

## 2018-11-29 DIAGNOSIS — Z79.82 LONG TERM (CURRENT) USE OF ASPIRIN: ICD-10-CM

## 2018-11-29 DIAGNOSIS — M54.10 RADICULOPATHY, SITE UNSPECIFIED: ICD-10-CM

## 2018-11-29 DIAGNOSIS — Z91.041 RADIOGRAPHIC DYE ALLERGY STATUS: ICD-10-CM

## 2018-11-29 DIAGNOSIS — R06.02 SHORTNESS OF BREATH: ICD-10-CM

## 2018-11-29 DIAGNOSIS — J45.909 UNSPECIFIED ASTHMA, UNCOMPLICATED: ICD-10-CM

## 2018-11-29 DIAGNOSIS — Z87.19 PERSONAL HISTORY OF OTHER DISEASES OF THE DIGESTIVE SYSTEM: ICD-10-CM

## 2018-11-29 DIAGNOSIS — M54.9 DORSALGIA, UNSPECIFIED: ICD-10-CM

## 2019-01-28 NOTE — ED PROVIDER NOTE - CPE EDP EYES NORM
Detail Level: Detailed
Patient Specific Counseling (Will Not Stick From Patient To Patient): Wesley gleason should help
normal...

## 2019-06-27 ENCOUNTER — TRANSCRIPTION ENCOUNTER (OUTPATIENT)
Age: 84
End: 2019-06-27

## 2019-12-06 RX ORDER — AZITHROMYCIN 500 MG/1
0 TABLET, FILM COATED ORAL
Qty: 0 | Refills: 0 | DISCHARGE
Start: 2019-12-06 | End: 2019-12-10

## 2019-12-23 ENCOUNTER — INPATIENT (INPATIENT)
Facility: HOSPITAL | Age: 84
LOS: 10 days | Discharge: HOME HEALTH | DRG: 193 | End: 2020-01-03
Attending: HOSPITALIST | Admitting: FAMILY MEDICINE
Payer: MEDICARE

## 2019-12-23 VITALS — WEIGHT: 91.93 LBS

## 2019-12-23 DIAGNOSIS — S72.91XA UNSPECIFIED FRACTURE OF RIGHT FEMUR, INITIAL ENCOUNTER FOR CLOSED FRACTURE: Chronic | ICD-10-CM

## 2019-12-23 DIAGNOSIS — J18.9 PNEUMONIA, UNSPECIFIED ORGANISM: ICD-10-CM

## 2019-12-23 DIAGNOSIS — S62.102A FRACTURE OF UNSPECIFIED CARPAL BONE, LEFT WRIST, INITIAL ENCOUNTER FOR CLOSED FRACTURE: Chronic | ICD-10-CM

## 2019-12-23 LAB
ALBUMIN SERPL ELPH-MCNC: 3.2 G/DL — LOW (ref 3.3–5)
ALP SERPL-CCNC: 74 U/L — SIGNIFICANT CHANGE UP (ref 40–120)
ALT FLD-CCNC: 23 U/L — SIGNIFICANT CHANGE UP (ref 12–78)
ANION GAP SERPL CALC-SCNC: 6 MMOL/L — SIGNIFICANT CHANGE UP (ref 5–17)
APPEARANCE UR: CLEAR — SIGNIFICANT CHANGE UP
APTT BLD: 31.8 SEC — SIGNIFICANT CHANGE UP (ref 27.5–36.3)
AST SERPL-CCNC: 23 U/L — SIGNIFICANT CHANGE UP (ref 15–37)
BASOPHILS # BLD AUTO: 0.04 K/UL — SIGNIFICANT CHANGE UP (ref 0–0.2)
BASOPHILS NFR BLD AUTO: 0.7 % — SIGNIFICANT CHANGE UP (ref 0–2)
BILIRUB SERPL-MCNC: 0.6 MG/DL — SIGNIFICANT CHANGE UP (ref 0.2–1.2)
BILIRUB UR-MCNC: NEGATIVE — SIGNIFICANT CHANGE UP
BUN SERPL-MCNC: 16 MG/DL — SIGNIFICANT CHANGE UP (ref 7–23)
CALCIUM SERPL-MCNC: 8.8 MG/DL — SIGNIFICANT CHANGE UP (ref 8.5–10.1)
CHLORIDE SERPL-SCNC: 104 MMOL/L — SIGNIFICANT CHANGE UP (ref 96–108)
CO2 SERPL-SCNC: 25 MMOL/L — SIGNIFICANT CHANGE UP (ref 22–31)
COLOR SPEC: YELLOW — SIGNIFICANT CHANGE UP
CREAT SERPL-MCNC: 0.67 MG/DL — SIGNIFICANT CHANGE UP (ref 0.5–1.3)
DIFF PNL FLD: ABNORMAL
EOSINOPHIL # BLD AUTO: 0.01 K/UL — SIGNIFICANT CHANGE UP (ref 0–0.5)
EOSINOPHIL NFR BLD AUTO: 0.2 % — SIGNIFICANT CHANGE UP (ref 0–6)
GLUCOSE SERPL-MCNC: 85 MG/DL — SIGNIFICANT CHANGE UP (ref 70–99)
GLUCOSE UR QL: NEGATIVE MG/DL — SIGNIFICANT CHANGE UP
HCT VFR BLD CALC: 40.3 % — SIGNIFICANT CHANGE UP (ref 34.5–45)
HGB BLD-MCNC: 13.4 G/DL — SIGNIFICANT CHANGE UP (ref 11.5–15.5)
IMM GRANULOCYTES NFR BLD AUTO: 0.3 % — SIGNIFICANT CHANGE UP (ref 0–1.5)
INR BLD: 0.97 RATIO — SIGNIFICANT CHANGE UP (ref 0.88–1.16)
KETONES UR-MCNC: ABNORMAL
LACTATE SERPL-SCNC: 1.2 MMOL/L — SIGNIFICANT CHANGE UP (ref 0.7–2)
LEUKOCYTE ESTERASE UR-ACNC: NEGATIVE — SIGNIFICANT CHANGE UP
LYMPHOCYTES # BLD AUTO: 0.81 K/UL — LOW (ref 1–3.3)
LYMPHOCYTES # BLD AUTO: 13.3 % — SIGNIFICANT CHANGE UP (ref 13–44)
MCHC RBC-ENTMCNC: 30 PG — SIGNIFICANT CHANGE UP (ref 27–34)
MCHC RBC-ENTMCNC: 33.3 GM/DL — SIGNIFICANT CHANGE UP (ref 32–36)
MCV RBC AUTO: 90.2 FL — SIGNIFICANT CHANGE UP (ref 80–100)
MONOCYTES # BLD AUTO: 1.01 K/UL — HIGH (ref 0–0.9)
MONOCYTES NFR BLD AUTO: 16.6 % — HIGH (ref 2–14)
NEUTROPHILS # BLD AUTO: 4.18 K/UL — SIGNIFICANT CHANGE UP (ref 1.8–7.4)
NEUTROPHILS NFR BLD AUTO: 68.9 % — SIGNIFICANT CHANGE UP (ref 43–77)
NITRITE UR-MCNC: NEGATIVE — SIGNIFICANT CHANGE UP
NT-PROBNP SERPL-SCNC: 339 PG/ML — SIGNIFICANT CHANGE UP (ref 0–450)
PH UR: 5 — SIGNIFICANT CHANGE UP (ref 5–8)
PLATELET # BLD AUTO: 242 K/UL — SIGNIFICANT CHANGE UP (ref 150–400)
POTASSIUM SERPL-MCNC: 3.9 MMOL/L — SIGNIFICANT CHANGE UP (ref 3.5–5.3)
POTASSIUM SERPL-SCNC: 3.9 MMOL/L — SIGNIFICANT CHANGE UP (ref 3.5–5.3)
PROT SERPL-MCNC: 6.7 GM/DL — SIGNIFICANT CHANGE UP (ref 6–8.3)
PROT UR-MCNC: 15 MG/DL
PROTHROM AB SERPL-ACNC: 10.8 SEC — SIGNIFICANT CHANGE UP (ref 10–12.9)
RAPID RVP RESULT: DETECTED
RBC # BLD: 4.47 M/UL — SIGNIFICANT CHANGE UP (ref 3.8–5.2)
RBC # FLD: 13.5 % — SIGNIFICANT CHANGE UP (ref 10.3–14.5)
RSV RNA SPEC QL NAA+PROBE: DETECTED
SODIUM SERPL-SCNC: 135 MMOL/L — SIGNIFICANT CHANGE UP (ref 135–145)
SP GR SPEC: 1.01 — SIGNIFICANT CHANGE UP (ref 1.01–1.02)
UROBILINOGEN FLD QL: NEGATIVE MG/DL — SIGNIFICANT CHANGE UP
WBC # BLD: 6.07 K/UL — SIGNIFICANT CHANGE UP (ref 3.8–10.5)
WBC # FLD AUTO: 6.07 K/UL — SIGNIFICANT CHANGE UP (ref 3.8–10.5)

## 2019-12-23 PROCEDURE — 80048 BASIC METABOLIC PNL TOTAL CA: CPT

## 2019-12-23 PROCEDURE — 94640 AIRWAY INHALATION TREATMENT: CPT

## 2019-12-23 PROCEDURE — 85027 COMPLETE CBC AUTOMATED: CPT

## 2019-12-23 PROCEDURE — 93010 ELECTROCARDIOGRAM REPORT: CPT

## 2019-12-23 PROCEDURE — 36415 COLL VENOUS BLD VENIPUNCTURE: CPT

## 2019-12-23 PROCEDURE — 71045 X-RAY EXAM CHEST 1 VIEW: CPT

## 2019-12-23 PROCEDURE — 87449 NOS EACH ORGANISM AG IA: CPT

## 2019-12-23 PROCEDURE — 94760 N-INVAS EAR/PLS OXIMETRY 1: CPT

## 2019-12-23 PROCEDURE — 87070 CULTURE OTHR SPECIMN AEROBIC: CPT

## 2019-12-23 RX ORDER — ALBUTEROL 90 UG/1
2 AEROSOL, METERED ORAL
Qty: 0 | Refills: 0 | DISCHARGE

## 2019-12-23 RX ORDER — RANITIDINE HYDROCHLORIDE 150 MG/1
1 TABLET, FILM COATED ORAL
Qty: 0 | Refills: 0 | DISCHARGE

## 2019-12-23 RX ORDER — GABAPENTIN 400 MG/1
300 CAPSULE ORAL DAILY
Refills: 0 | Status: DISCONTINUED | OUTPATIENT
Start: 2019-12-23 | End: 2020-01-03

## 2019-12-23 RX ORDER — BUDESONIDE AND FORMOTEROL FUMARATE DIHYDRATE 160; 4.5 UG/1; UG/1
2 AEROSOL RESPIRATORY (INHALATION)
Qty: 0 | Refills: 0 | DISCHARGE

## 2019-12-23 RX ORDER — IPRATROPIUM/ALBUTEROL SULFATE 18-103MCG
3 AEROSOL WITH ADAPTER (GRAM) INHALATION ONCE
Refills: 0 | Status: COMPLETED | OUTPATIENT
Start: 2019-12-23 | End: 2019-12-23

## 2019-12-23 RX ORDER — GABAPENTIN 400 MG/1
2 CAPSULE ORAL
Qty: 0 | Refills: 0 | DISCHARGE

## 2019-12-23 RX ORDER — LATANOPROST 0.05 MG/ML
1 SOLUTION/ DROPS OPHTHALMIC; TOPICAL AT BEDTIME
Refills: 0 | Status: DISCONTINUED | OUTPATIENT
Start: 2019-12-23 | End: 2020-01-03

## 2019-12-23 RX ORDER — FLUTICASONE PROPIONATE 50 MCG
1 SPRAY, SUSPENSION NASAL
Qty: 0 | Refills: 0 | DISCHARGE

## 2019-12-23 RX ORDER — VANCOMYCIN HCL 1 G
750 VIAL (EA) INTRAVENOUS ONCE
Refills: 0 | Status: COMPLETED | OUTPATIENT
Start: 2019-12-23 | End: 2019-12-23

## 2019-12-23 RX ORDER — CEFEPIME 1 G/1
1000 INJECTION, POWDER, FOR SOLUTION INTRAMUSCULAR; INTRAVENOUS EVERY 12 HOURS
Refills: 0 | Status: DISCONTINUED | OUTPATIENT
Start: 2019-12-23 | End: 2019-12-23

## 2019-12-23 RX ORDER — GABAPENTIN 400 MG/1
3 CAPSULE ORAL
Qty: 0 | Refills: 0 | DISCHARGE

## 2019-12-23 RX ORDER — FENTANYL CITRATE 50 UG/ML
1 INJECTION INTRAVENOUS
Refills: 0 | Status: DISCONTINUED | OUTPATIENT
Start: 2019-12-23 | End: 2019-12-28

## 2019-12-23 RX ORDER — IPRATROPIUM/ALBUTEROL SULFATE 18-103MCG
3 AEROSOL WITH ADAPTER (GRAM) INHALATION EVERY 6 HOURS
Refills: 0 | Status: DISCONTINUED | OUTPATIENT
Start: 2019-12-23 | End: 2020-01-03

## 2019-12-23 RX ORDER — TEMAZEPAM 15 MG/1
1 CAPSULE ORAL
Qty: 0 | Refills: 0 | DISCHARGE

## 2019-12-23 RX ORDER — CEFEPIME 1 G/1
1000 INJECTION, POWDER, FOR SOLUTION INTRAMUSCULAR; INTRAVENOUS EVERY 12 HOURS
Refills: 0 | Status: DISCONTINUED | OUTPATIENT
Start: 2019-12-23 | End: 2019-12-24

## 2019-12-23 RX ORDER — SALMETEROL XINAFOATE 50 MCG
1 BLISTER, WITH INHALATION DEVICE INHALATION
Qty: 0 | Refills: 0 | DISCHARGE

## 2019-12-23 RX ORDER — CEFEPIME 1 G/1
1000 INJECTION, POWDER, FOR SOLUTION INTRAMUSCULAR; INTRAVENOUS ONCE
Refills: 0 | Status: COMPLETED | OUTPATIENT
Start: 2019-12-23 | End: 2019-12-23

## 2019-12-23 RX ORDER — DULOXETINE HYDROCHLORIDE 30 MG/1
30 CAPSULE, DELAYED RELEASE ORAL DAILY
Refills: 0 | Status: DISCONTINUED | OUTPATIENT
Start: 2019-12-23 | End: 2020-01-03

## 2019-12-23 RX ORDER — CEFEPIME 1 G/1
1000 INJECTION, POWDER, FOR SOLUTION INTRAMUSCULAR; INTRAVENOUS ONCE
Refills: 0 | Status: DISCONTINUED | OUTPATIENT
Start: 2019-12-23 | End: 2019-12-23

## 2019-12-23 RX ORDER — VANCOMYCIN HCL 1 G
1000 VIAL (EA) INTRAVENOUS ONCE
Refills: 0 | Status: DISCONTINUED | OUTPATIENT
Start: 2019-12-23 | End: 2019-12-23

## 2019-12-23 RX ORDER — DULOXETINE HYDROCHLORIDE 30 MG/1
1 CAPSULE, DELAYED RELEASE ORAL
Qty: 0 | Refills: 0 | DISCHARGE

## 2019-12-23 RX ORDER — SODIUM CHLORIDE 9 MG/ML
3 INJECTION INTRAMUSCULAR; INTRAVENOUS; SUBCUTANEOUS ONCE
Refills: 0 | Status: COMPLETED | OUTPATIENT
Start: 2019-12-23 | End: 2019-12-23

## 2019-12-23 RX ORDER — FLUTICASONE PROPIONATE 220 MCG
2 AEROSOL WITH ADAPTER (GRAM) INHALATION
Qty: 0 | Refills: 0 | DISCHARGE

## 2019-12-23 RX ORDER — VANCOMYCIN HCL 1 G
750 VIAL (EA) INTRAVENOUS EVERY 12 HOURS
Refills: 0 | Status: DISCONTINUED | OUTPATIENT
Start: 2019-12-24 | End: 2019-12-24

## 2019-12-23 RX ORDER — BIMATOPROST 0.3 MG/ML
1 SOLUTION/ DROPS OPHTHALMIC
Qty: 0 | Refills: 0 | DISCHARGE

## 2019-12-23 RX ORDER — FENTANYL CITRATE 50 UG/ML
1 INJECTION INTRAVENOUS
Qty: 0 | Refills: 0 | DISCHARGE

## 2019-12-23 RX ORDER — HEPARIN SODIUM 5000 [USP'U]/ML
5000 INJECTION INTRAVENOUS; SUBCUTANEOUS EVERY 12 HOURS
Refills: 0 | Status: DISCONTINUED | OUTPATIENT
Start: 2019-12-23 | End: 2020-01-03

## 2019-12-23 RX ORDER — BUDESONIDE AND FORMOTEROL FUMARATE DIHYDRATE 160; 4.5 UG/1; UG/1
2 AEROSOL RESPIRATORY (INHALATION)
Refills: 0 | Status: DISCONTINUED | OUTPATIENT
Start: 2019-12-23 | End: 2020-01-03

## 2019-12-23 RX ADMIN — CEFEPIME 1000 MILLIGRAM(S): 1 INJECTION, POWDER, FOR SOLUTION INTRAMUSCULAR; INTRAVENOUS at 14:51

## 2019-12-23 RX ADMIN — Medication 3 MILLILITER(S): at 14:51

## 2019-12-23 RX ADMIN — Medication 80 MILLIGRAM(S): at 14:51

## 2019-12-23 RX ADMIN — SODIUM CHLORIDE 3 MILLILITER(S): 9 INJECTION INTRAMUSCULAR; INTRAVENOUS; SUBCUTANEOUS at 14:52

## 2019-12-23 RX ADMIN — Medication 250 MILLIGRAM(S): at 15:12

## 2019-12-23 RX ADMIN — Medication 3 MILLILITER(S): at 14:50

## 2019-12-23 NOTE — ED PROVIDER NOTE - RESPIRATORY [+], MLM
"  Wednesday, December 12th, 2018      Protocol: [id number] Randomized Phase III Trial of Lenalidomide vs Observation Alone in Patients with Asymptomatic High-Risk Smoldering Multiple Myeloma.   Sponsor:  [facility]  IRB #: [id number]  Study ID: [id number]  Investigator: GIL Engel Pt Initials: LUCÍA LORENZ       Arm B: Observation  Cycle 36, Day 28       Patient presented to evaluate for ability to proceed with above-mentioned protocol.  He presents one day s/p major dental work, which has helped with recent mouth pain.  He state he received a steroid injection to his knees recently which greatly helped with knee issues.  However he has had trouble controlling glucose levels recently because of this.  States he has been better about working to control his diabetes with diet and exercise; states he feels he's lost a few pounds as of late.  Otherwise no new issues. He denies new potential CRAB symptoms.  He states continued willingness to participate in above-mentioned study.     Review of Baseline AE's:    *Please note this list is not all-inclusive, please see AE log for physician reviewed list of adverse events.   1. Hypertension, grade 1:137/78 today.  AE ongoing  2. Lower Back Pain and Neck Pain, grade 1: Patient has no complaints of this month. As previously stated, patient is not suspected to have bony myeloma involvement per Dr. Engel as these are pre-existing issues present at baseline.  AE ongoing.  3. Pain in extremity (knee), grade 1.  Continues to wear knee stabilizing braces, recently s/p injections to the knee.  AE ongoing   4. Peripheral sensory neuropathy, grade 1: Patient does not verbalize complaints of this today. Has gabapentin and takes as needed.  "Haven't taken that one in a while" per patient. AE ongoing.   5. Anemia, Grade 1: Hgb/Hct -11.5/35.0.  Result reviewed by Dr. Engel.   AE ongoing                 Review of AE's:     *Please note this list is not all-inclusive, please see AE log for physician reviewed " "list of adverse events.   1. Creatinine increased, grade 2: Serum creatinine is at 1.7 mg/dl today and GFR 47.94.  Reviewed per Dr. Engel.   As previously stated, Dr. Engel states this has not been related to myeloma and is related chronic kidney issues likely secondary to diabetes and hypertension.  Will continue observation monthly and to monitor renal function closely. Per MD, encouraged to increase water intake. AE worsened from baseline.      Per study chair, "the definition of progressive disease for this protocol is developing CRAB criteria that requires treatment systemically." Per Dr Engel, based on today's exam and lab results thus far, patient does not have any s/s of CRAB: Normal Calcium level (10.0), absence of Renal insufficiency (serum creatinine 1.7, and GFR 46.94 per [facility]--patient with a history of kidney dysfunction secondary to diabetes; Dr. Engel aware of these values and not concerned for myeloma involvement), absence of significant Anemia (hemoglobin 11.5, has decreased from baseline; will await myeloma labs for clarity relationship to myeloma) and absence of lytic Bone lesions (per baseline metastatic survey as well as MRI--see MD note for further comment). See MD note for ECOG score and H&P and flowsheets for laboratory work, vitals, etc. All myeloma-related blood work from last cycle including SPEP and JAGUAR have remained stable, and are currently pending for this cycle. Per Dr. Engel, patient shows no evidence of progression at last result. Patient informed that Dr. Engel will release all lab results to [facility]. He states understanding of this. QOL's were missed in clinic, so Research RN followed up with the patient via phone call and completed the questionnaires over the phone.        Discussed next month's appointments with patient; he states understanding.   Will continue to schedule patient several months out, which seems to help maintain compliance with visits.    Patient states " having research RN's contact information and has MD contact information to call with any concerns, questions, or worsening of symptoms; he verbalized understanding.         COUGH/SHORTNESS OF BREATH

## 2019-12-23 NOTE — ED ADULT NURSE NOTE - CHPI ED NUR SYMPTOMS POS
SHORTNESS OF BREATH/NASAL CONGESTION/DIFFICULTY BREATHING/COUGH/DYSPNEA ON EXERTION/CHEST CONGESTION

## 2019-12-23 NOTE — ED PROVIDER NOTE - CARDIAC, MLM
Tachycardic rate, regular rhythm.  Heart sounds S1, S2.  No murmurs, rubs or gallops. Normal radial pulse.

## 2019-12-23 NOTE — ED PROVIDER NOTE - PMH
Asthma    Chronic back pain    COPD (chronic obstructive pulmonary disease)    IBS (irritable bowel syndrome)    Walking pneumonia

## 2019-12-23 NOTE — ED ADULT NURSE NOTE - NSIMPLEMENTINTERV_GEN_ALL_ED
Implemented All Fall with Harm Risk Interventions:  [place] to call system. Call bell, personal items and telephone within reach. Instruct patient to call for assistance. Room bathroom lighting operational. Non-slip footwear when patient is off stretcher. Physically safe environment: no spills, clutter or unnecessary equipment. Stretcher in lowest position, wheels locked, appropriate side rails in place. Provide visual cue, wrist band, yellow gown, etc. Monitor gait and stability. Monitor for mental status changes and reorient to person, place, and time. Review medications for side effects contributing to fall risk. Reinforce activity limits and safety measures with patient and family. Provide visual clues: red socks.

## 2019-12-23 NOTE — H&P ADULT - NSHPPHYSICALEXAM_GEN_ALL_CORE
ICU Vital Signs Last 24 Hrs    T(F): 98 (23 Dec 2019 13:22), Max: 98 (23 Dec 2019 13:22)  HR: 84 (23 Dec 2019 13:44) (84 - 97)  BP: 123/84 (23 Dec 2019 14:02) (119/99 - 129/92)    RR: 17 (23 Dec 2019 13:44) (17 - 18)  SpO2: 100% (23 Dec 2019 13:44) (99% - 100%)

## 2019-12-23 NOTE — H&P ADULT - NSICDXPASTMEDICALHX_GEN_ALL_CORE_FT
PAST MEDICAL HISTORY:  Asthma     Chronic back pain     COPD (chronic obstructive pulmonary disease)     IBS (irritable bowel syndrome)     Walking pneumonia

## 2019-12-23 NOTE — ED PROVIDER NOTE - CONSTITUTIONAL, MLM
normal... Elderly WF. Well appearing, awake, alert, oriented to person, place, time/situation and in mild respiratory distress. No sentence shortening.

## 2019-12-23 NOTE — ED ADULT TRIAGE NOTE - CHIEF COMPLAINT QUOTE
pt c/p SOB worsening since friday. pt dneies chest pain weakness, dizziness, fever, cough and congestion. pt states she had taken a 10 day course of perdnisone with no relief. pt currently wheezing with o2 sat of 86 eprcent on room air with HR of 121, pt direct to main

## 2019-12-23 NOTE — ED PROVIDER NOTE - OBJECTIVE STATEMENT
84 y/o F with a PMHx of IBS, COPD, asthma presents to the ED sent by MD Griffin c/o wheezing and SOB worsening over the past 3 days. Pt saw MD Gaby GREGORIO and had outpt CT done. MD Griffin then sent pt to ED for admission. Pt states she took a 10 day course of Prednisone 2 weeks ago with no relief for asthma episode. Notes that symptoms began the day after she finished the Prednisone last week. States that when she coughs, she loses her breath. SOB exacerbated by ambulating. Pt has home ProAir inhaler at home but she did not use it today. Pt is not on home O2. Reports recent productive cough; yellow in color. +decreased PO intake. Pt with h/o "walking PNA" which required HH admission. Denies CP, weakness, dizziness, fever, congestion, or orthopnea. PMD: Dr. Forrest in [place]. Pulmonologist: Dr. Griffin.

## 2019-12-23 NOTE — ED PROVIDER NOTE - CLINICAL SUMMARY MEDICAL DECISION MAKING FREE TEXT BOX
85 F h/o COPD recently completed Steroid taper, recent PO Zithromax for outpt PNA treatment BIBA from Pulm office regarding 2-3 days worsening SOB, cough. Hypoxic in MD office. Pt not on home O2. Outpt CT done at MD office PTA. Plan: EKG, labs including cultures, lactate, IV abx, IV steroids, duo-nebs, O2 supplementation, admit,

## 2019-12-23 NOTE — INPATIENT CERTIFICATION FOR MEDICARE PATIENTS - NS ICMP CERT SIG IND
I certify as stated above.
Detail Level: Simple
Patient Specific Counseling (Will Not Stick From Patient To Patient): Recommended Lamisil tablets to treat the fungus and he needs to continue the Lamisil cream after every shower. He will take the Lamisil every day for 3 months then stop for 2 months then take one more month.

## 2019-12-23 NOTE — ED PROVIDER NOTE - CARE PLAN
Principal Discharge DX:	HCAP (healthcare-associated pneumonia)  Secondary Diagnosis:	COPD (chronic obstructive pulmonary disease)  Secondary Diagnosis:	Hypoxia

## 2019-12-23 NOTE — ED ADULT NURSE NOTE - OBJECTIVE STATEMENT
PT c/o worsening SOB since yesterday, worse with exertion.  PT c/o symptoms x 2 weeks, states was given prednisone and has recently finished dose.  Pt c/o cough with sputum noted.  Vitals as noted, pulse 0x RA 80s.  2L NC in place at this time.  Pt denies fever.  Will continue to [facility]

## 2019-12-23 NOTE — H&P ADULT - NSICDXPASTSURGICALHX_GEN_ALL_CORE_FT
PAST SURGICAL HISTORY:  Bakers cyst      delivery delivered x 2    Closed fracture of right femur, unspecified fracture morphology, unspecified portion of femur, initial encounter     Fracture of left wrist, closed, initial encounter s/p repair x 2    History of back surgery     S/P cataract surgery left

## 2019-12-23 NOTE — H&P ADULT - HISTORY OF PRESENT ILLNESS
86 y/o F with a PMHx of IBS, COPD, asthma presents to the ED sent by MD Griffin c/o wheezing and SOB worsening over the past 3 days. Pt saw MD Gaby GREGORIO and had outpt CT done. MD Griffin then sent pt to ED for admission. Pt states she took a 10 day course of Prednisone 2 weeks ago with no relief for asthma episode. Notes that symptoms began the day after she finished the Prednisone last week. States that when she coughs, she loses her breath. SOB exacerbated by ambulating. Pt has home ProAir inhaler at home but she did not use it today. Pt is not on home O2. Reports recent productive cough; yellow in color. +decreased PO intake. Pt with h/o "walking PNA" which required HH admission. Denies CP, weakness, dizziness, fever, congestion, or orthopnea. Pt is an 84 y/o F with a PMHx of IBS, COPD/asthma, hx of walking Pna in the past  who was  sent to the ED by MD Griffin c/o wheezing and SOB worsening over the past 3 days. Pt saw MD Griffin  and had an outpt CT done which showed apparent new lung nodule concerning for infection and pna.  Pt completed a 10 day course of Prednisone  for asthma.  Then she  notes that symptoms began the day after she finished the Prednisone last week. She reports  that when she coughs, she loses her breath and SOB is exacerbated by ambulating. Pt  did not use her home ProAir inhaler.  She is not on home O2.  Pt c/o  productive yellow cough yellow  and decreased PO intake. Pt denies CP, weakness, dizziness, fever, congestion, or orthopnea. No urinary complaints, no rash.      Fam Hx:  Mother  at 93  Father  at 92  Sister asthma

## 2019-12-24 LAB
CULTURE RESULTS: SIGNIFICANT CHANGE UP
SPECIMEN SOURCE: SIGNIFICANT CHANGE UP

## 2019-12-24 RX ORDER — AZITHROMYCIN 500 MG/1
500 TABLET, FILM COATED ORAL DAILY
Refills: 0 | Status: COMPLETED | OUTPATIENT
Start: 2019-12-24 | End: 2019-12-27

## 2019-12-24 RX ORDER — MONTELUKAST 4 MG/1
10 TABLET, CHEWABLE ORAL DAILY
Refills: 0 | Status: DISCONTINUED | OUTPATIENT
Start: 2019-12-24 | End: 2020-01-03

## 2019-12-24 RX ORDER — CEFTRIAXONE 500 MG/1
2000 INJECTION, POWDER, FOR SOLUTION INTRAMUSCULAR; INTRAVENOUS EVERY 24 HOURS
Refills: 0 | Status: COMPLETED | OUTPATIENT
Start: 2019-12-24 | End: 2019-12-31

## 2019-12-24 RX ADMIN — Medication 3 MILLILITER(S): at 21:00

## 2019-12-24 RX ADMIN — BUDESONIDE AND FORMOTEROL FUMARATE DIHYDRATE 2 PUFF(S): 160; 4.5 AEROSOL RESPIRATORY (INHALATION) at 20:59

## 2019-12-24 RX ADMIN — Medication 3 MILLILITER(S): at 09:12

## 2019-12-24 RX ADMIN — HEPARIN SODIUM 5000 UNIT(S): 5000 INJECTION INTRAVENOUS; SUBCUTANEOUS at 05:12

## 2019-12-24 RX ADMIN — Medication 40 MILLIGRAM(S): at 05:13

## 2019-12-24 RX ADMIN — MONTELUKAST 10 MILLIGRAM(S): 4 TABLET, CHEWABLE ORAL at 14:40

## 2019-12-24 RX ADMIN — Medication 3 MILLILITER(S): at 01:46

## 2019-12-24 RX ADMIN — Medication 250 MILLIGRAM(S): at 03:04

## 2019-12-24 RX ADMIN — Medication 3 MILLILITER(S): at 13:50

## 2019-12-24 RX ADMIN — AZITHROMYCIN 500 MILLIGRAM(S): 500 TABLET, FILM COATED ORAL at 11:01

## 2019-12-24 RX ADMIN — CEFTRIAXONE 2000 MILLIGRAM(S): 500 INJECTION, POWDER, FOR SOLUTION INTRAMUSCULAR; INTRAVENOUS at 11:01

## 2019-12-24 RX ADMIN — HEPARIN SODIUM 5000 UNIT(S): 5000 INJECTION INTRAVENOUS; SUBCUTANEOUS at 17:12

## 2019-12-24 RX ADMIN — CEFEPIME 1000 MILLIGRAM(S): 1 INJECTION, POWDER, FOR SOLUTION INTRAMUSCULAR; INTRAVENOUS at 02:58

## 2019-12-24 RX ADMIN — DULOXETINE HYDROCHLORIDE 30 MILLIGRAM(S): 30 CAPSULE, DELAYED RELEASE ORAL at 11:01

## 2019-12-24 RX ADMIN — Medication 40 MILLIGRAM(S): at 22:20

## 2019-12-24 RX ADMIN — Medication 40 MILLIGRAM(S): at 14:43

## 2019-12-24 NOTE — CONSULT NOTE ADULT - SUBJECTIVE AND OBJECTIVE BOX
Patient is a 85y old  Female who presents with a chief complaint of Resp Distress    HPI:  86 y/o Female with h/o IBS, COPD/asthma, prior Pna was admitted on  after she was sent to the ED by MD Griffin for wheezing and SOB worsening over the past 3 days. Pt saw MD Griffin  and had an outpt CT done which showed new lung infiltration concerning for infection and pna.  Pt completed a 10 day course of Prednisone  for asthma with improvement in her respiratory condition.. She became more SOB the day after she finished the Prednisone the week PTA. She reports  that when she coughs, she loses her breath and SOB is exacerbated by ambulating. Pt  did not use her home ProAir inhaler. She is not on home O2.  Pt has  productive yellow cough and decreased PO intake. In ER she received cefepime and vancomycin IV.       PMH: as above  PSH: as above  Meds: per reconciliation sheet, noted below  MEDICATIONS  (STANDING):  albuterol/ipratropium for Nebulization 3 milliLiter(s) Nebulizer every 6 hours  budesonide 160 MICROgram(s)/formoterol 4.5 MICROgram(s) Inhaler 2 Puff(s) Inhalation two times a day  cefepime  Injectable. 1000 milliGRAM(s) IV Push every 12 hours  DULoxetine 30 milliGRAM(s) Oral daily  fentaNYL   Patch  25 MICROgram(s)/Hr 1 Patch Transdermal every 72 hours  heparin  Injectable 5000 Unit(s) SubCutaneous every 12 hours  latanoprost 0.005% Ophthalmic Solution 1 Drop(s) Both EYES at bedtime  methylPREDNISolone sodium succinate Injectable 40 milliGRAM(s) IV Push two times a day  vancomycin  IVPB 750 milliGRAM(s) IV Intermittent every 12 hours    MEDICATIONS  (PRN):  gabapentin 300 milliGRAM(s) Oral daily PRN back pain    Allergies    almost  from MRI dye (Other)  No Known Drug Allergies    Intolerances    oxycodone (Nausea)    Social: no smoking, no alcohol, no illegal drugs; no recent travel, no exposure to TB  FAMILY HISTORY:    no history of premature cardiovascular disease in first degree relatives  Mother  at 93  Father  at 92  Sister asthma    ROS: the patient denies fever, no chills, no HA, no seizures, no dizziness, no sore throat, no nasal congestion, no blurry vision, no CP, no palpitations, has SOB, has cough, no abdominal pain, no diarrhea, no N/V, no dysuria, no leg pain, no claudication, no rash, no joint aches, no rectal pain or bleeding, no night sweats  All other systems reviewed and are negative    Vital Signs Last 24 Hrs  T(C): 36.5 (23 Dec 2019 20:08), Max: 36.7 (23 Dec 2019 13:22)  T(F): 97.7 (23 Dec 2019 20:08), Max: 98 (23 Dec 2019 13:22)  HR: 78 (24 Dec 2019 01:47) (78 - 97)  BP: 146/93 (23 Dec 2019 20:08) (119/99 - 146/93)  BP(mean): --  RR: 18 (23 Dec 2019 20:08) (15 - 18)  SpO2: 97% (24 Dec 2019 01:47) (95% - 100%)  Daily     Daily     PE:    Constitutional: frail looking  HEENT: NC/AT, EOMI, PERRLA, conjunctivae clear; ears and nose atraumatic; pharynx benign  Neck: supple; thyroid not palpable  Back: no tenderness  Respiratory: respiratory effort normal; crackles b/l  Cardiovascular: S1S2 regular, no murmurs  Abdomen: soft, not tender, not distended, positive BS; no liver or spleen organomegaly  Genitourinary: no suprapubic tenderness  Lymphatic: no LN palpable  Musculoskeletal: no muscle tenderness, no joint swelling or tenderness  Extremities: no pedal edema  Neurological/ Psychiatric: AxOx3, judgement and insight normal; moving all extremities  Skin: no rashes; no palpable lesions    Labs: all available labs reviewed                        13.4   6.07  )-----------( 242      ( 23 Dec 2019 13:55 )             40.3         135  |  104  |  16  ----------------------------<  85  3.9   |  25  |  0.67    Ca    8.8      23 Dec 2019 13:55    TPro  6.7  /  Alb  3.2<L>  /  TBili  0.6  /  DBili  x   /  AST  23  /  ALT  23  /  AlkPhos  74  1223     LIVER FUNCTIONS - ( 23 Dec 2019 13:55 )  Alb: 3.2 g/dL / Pro: 6.7 gm/dL / ALK PHOS: 74 U/L / ALT: 23 U/L / AST: 23 U/L / GGT: x           Urinalysis Basic - ( 23 Dec 2019 14:57 )    Color: Yellow / Appearance: Clear / S.015 / pH: x  Gluc: x / Ketone: Small  / Bili: Negative / Urobili: Negative mg/dL   Blood: x / Protein: 15 mg/dL / Nitrite: Negative   Leuk Esterase: Negative / RBC: 3-5 /HPF / WBC 0-2   Sq Epi: x / Non Sq Epi: Negative / Bacteria: Negative    Rapid Respiratory Viral Panel (.23.19 @ 14:57)    Rapid RVP Result: Detected:     Resp Syncytial Virus (RapRVP): Detected        Radiology: all available radiological tests reviewed        Advanced directives addressed: full resuscitation

## 2019-12-24 NOTE — PROGRESS NOTE ADULT - ASSESSMENT
Pt is admitted w/    Resp Distress, hypoxia 87%  Comm Aqc Pna, failing out pt tx,     RSV Pneumonia  Fever  Astma Exacerbation  New masslike lesions/consolidation infectious/neoplastic    PLAN:   - admit  - s/p Nebs, solumedrol, d/c them;   cont rocephin and zithro as per ID  - Cefepime and Vanco in the ED, stop  - CT chest in 6-8 weeks to document resolution  - ID and pulm consult appreciated  - DVT prophylaxis: heparin s/q Pt is admitted w/    Resp Distress, hypoxia 87%  Comm Aqc Pna, failing out pt tx,     RSV Pneumonia  Fever  Astma Exacerbation  New masslike lesions/consolidation infectious/neoplastic    PLAN:   - admit  - s/p Nebs, solumedrol,    cont rocephin and zithro as per ID  - Cefepime and Vanco in the ED, stop  - CT chest in 6-8 weeks to document resolution  - ID and pulm consult appreciated  - DVT prophylaxis: heparin s/q

## 2019-12-24 NOTE — PROGRESS NOTE ADULT - SUBJECTIVE AND OBJECTIVE BOX
HPI: Pt is an 84 y/o F with a PMHx of IBS, COPD/asthma, hx of walking Pna in the past  who was  sent to the ED by MD Griffin c/o wheezing and SOB worsening over the past 3 days. Pt saw MD Griffin  and had an outpt CT done which showed apparent new lung nodule concerning for infection and pna.  Pt completed a 10 day course of Prednisone  for asthma.  Then she  notes that symptoms began the day after she finished the Prednisone last week. She reports  that when she coughs, she loses her breath and SOB is exacerbated by ambulating. Pt  did not use her home ProAir inhaler.  She is not on home O2.  Pt c/o  productive yellow cough yellow  and decreased PO intake. Pt denies CP, weakness, dizziness, fever, congestion, or orthopnea. No urinary complaints, no rash.    : no new complaints      PHYSICAL EXAM:      T(C): 36.7 (24 Dec 2019 10:30), Max: 36.7 (23 Dec 2019 19:44)  T(F): 98 (24 Dec 2019 10:30), Max: 98 (23 Dec 2019 19:44)  HR: 74 (24 Dec 2019 13:51) (74 - 102)  BP: 98/72 (24 Dec 2019 10:30) (98/72 - 146/93)  RR: 18 (24 Dec 2019 10:30) (15 - 18)  SpO2: 96% (24 Dec 2019 10:30) (95% - 100%)      Constitutional: Weak appearing  HEENT: Atraumatic, LEI, Normal, No congestion  Respiratory: Breath Sounds normal, no rhonchi/wheeze  Cardiovascular: N S1S2;   Gastrointestinal: Abdomen soft, non tender, Bowel Sounds present  Extremities: No edema, peripheral pulses present  Neurological: AAO x 3, no gross focal motor deficits  Skin: Non cellulitic, no rash, ulcers  Lymph Nodes: No lymphadenopathy noted  Back: No CVA tenderness   Musculoskeletal: non tender  Breasts: Deferred  Genitourinary: deferred  Rectal: Deferred                          13.4   6.07  )-----------( 242      ( 23 Dec 2019 13:55 )             40.3       CBC Full  -  ( 23 Dec 2019 13:55 )  WBC Count : 6.07 K/uL  RBC Count : 4.47 M/uL  Hemoglobin : 13.4 g/dL  Hematocrit : 40.3 %  Platelet Count - Automated : 242 K/uL  Mean Cell Volume : 90.2 fl  Mean Cell Hemoglobin : 30.0 pg  Mean Cell Hemoglobin Concentration : 33.3 gm/dL  Auto Neutrophil # : 4.18 K/uL  Auto Lymphocyte # : 0.81 K/uL  Auto Monocyte # : 1.01 K/uL  Auto Eosinophil # : 0.01 K/uL  Auto Basophil # : 0.04 K/uL  Auto Neutrophil % : 68.9 %  Auto Lymphocyte % : 13.3 %  Auto Monocyte % : 16.6 %  Auto Eosinophil % : 0.2 %  Auto Basophil % : 0.7 %          135  |  104  |  16  ----------------------------<  85  3.9   |  25  |  0.67    Ca    8.8      23 Dec 2019 13:55    TPro  6.7  /  Alb  3.2<L>  /  TBili  0.6  /  DBili  x   /  AST  23  /  ALT  23  /  AlkPhos  74        LIVER FUNCTIONS - ( 23 Dec 2019 13:55 )  Alb: 3.2 g/dL / Pro: 6.7 gm/dL / ALK PHOS: 74 U/L / ALT: 23 U/L / AST: 23 U/L / GGT: x             PT/INR - ( 23 Dec 2019 13:55 )   PT: 10.8 sec;   INR: 0.97 ratio         PTT - ( 23 Dec 2019 13:55 )  PTT:31.8 sec          Urinalysis Basic - ( 23 Dec 2019 14:57 )    Color: Yellow / Appearance: Clear / S.015 / pH: x  Gluc: x / Ketone: Small  / Bili: Negative / Urobili: Negative mg/dL   Blood: x / Protein: 15 mg/dL / Nitrite: Negative   Leuk Esterase: Negative / RBC: 3-5 /HPF / WBC 0-2   Sq Epi: x / Non Sq Epi: Negative / Bacteria: Negative            MEDICATIONS  (STANDING):  albuterol/ipratropium for Nebulization 3 milliLiter(s) Nebulizer every 6 hours  azithromycin   Tablet 500 milliGRAM(s) Oral daily  budesonide 160 MICROgram(s)/formoterol 4.5 MICROgram(s) Inhaler 2 Puff(s) Inhalation two times a day  cefTRIAXone Injectable. 2000 milliGRAM(s) IV Push every 24 hours  DULoxetine 30 milliGRAM(s) Oral daily  fentaNYL   Patch  25 MICROgram(s)/Hr 1 Patch Transdermal every 72 hours  heparin  Injectable 5000 Unit(s) SubCutaneous every 12 hours  latanoprost 0.005% Ophthalmic Solution 1 Drop(s) Both EYES at bedtime  methylPREDNISolone sodium succinate Injectable 40 milliGRAM(s) IV Push every 8 hours  montelukast 10 milliGRAM(s) Oral daily

## 2019-12-24 NOTE — CONSULT NOTE ADULT - SUBJECTIVE AND OBJECTIVE BOX
Pulmonary Consult    Reason for Admission: Resp Distress Comm Aqc Pna, concern for RSV Pneumonia  Fever COPD Exac	  History of Present Illness: 	  Pt is an 86 y/o F with a PMHx of IBS, COPD/asthma, hx of walking Pna in the past  who was  sent to the ED by MD Griffin c/o wheezing and SOB worsening over the past 3 days. Pt saw MD Griffin  and had an outpt CT done which showed apparent new lung nodule concerning for infection and pna.  Pt completed a 10 day course of Prednisone  for asthma.  Then she  notes that symptoms began the day after she finished the Prednisone last week. She reports  that when she coughs, she loses her breath and SOB is exacerbated by ambulating. Pt  did not use her home ProAir inhaler.  She is not on home O2.  Pt c/o  productive yellow cough yellow  and decreased PO intake. Pt denies CP, weakness, dizziness, fever, congestion, or orthopnea. No urinary complaints, no rash.     PAST MEDICAL & SURGICAL HISTORY:  COPD (chronic obstructive pulmonary disease)  Walking pneumonia  IBS (irritable bowel syndrome)  Chronic back pain  Asthma  Fracture of left wrist, closed, initial encounter: s/p repair x 2  Closed fracture of right femur, unspecified fracture morphology, unspecified portion of femur, initial encounter   delivery delivered: x 2  Bakers cyst  S/P cataract surgery: left  History of back surgery        FAMILY HISTORY:      SOCIAL HISTORY:    No smoking no drug or alcohol abuse .     Allergies    &quot;almost &quot; from MRI dye (Other)  No Known Drug Allergies    Intolerances    oxycodone (Nausea)        REVIEW OF SYSTEMS:  Constitutional: No fevers or chills or weight loss.   Eyes: No itching or discharge from the eyes  ENT:  No post nasal drip. No epistaxis. No throat pain. No sore throat. No difficulty swallowing.   CV: No chest pain. No palpitations. No lightheadedness or dizziness.   Resp: No dyspnea  No wheezing. No cough. No stridor No sputum production. No chest pain with breathing .  GI: No nausea. No vomiting. No diarrhea or abdominal pain   MSK: No joint pain or pain in any extremities  Integumentary: No skin lesions. No pedal edema.  Neurological: No gross motor weakness. No sensory changes.      OBJECTIVE:  Vital Signs Last 24 Hrs  T(C): 36.8 (24 Dec 2019 11:11), Max: 36.8 (24 Dec 2019 11:11)  T(F): 98.2 (24 Dec 2019 11:11), Max: 98.2 (24 Dec 2019 11:11)  HR: 96 (24 Dec 2019 11:11) (76 - 97)  BP: 140/73 (24 Dec 2019 11:11) (119/99 - 146/93)  BP(mean): --  RR: 18 (24 Dec 2019 11:11) (15 - 18)  SpO2: 91% (24 Dec 2019 11:11) (91% - 100%)      PHYSICAL EXAM:  General: Awake, alert, oriented X 3.   HEENT: Atraumatic, normocephalic.   Neck: No JVD no lymphadenopathy   Respiratory: normal vesicular breathing with no wheeze or rales on the exam .  Cardiovascular: S1 S2 normal. No murmurs, rubs or gallops.   Abdomen: Soft, non-tender, non-distended. No organomegaly.  Extremities: Warm to touch. Peripheral pulse palpable. No pedal edema.   Skin: No rashes or skin lesions  Neurological: Motor and sensory examination equal and normal in all four extremities.  Psychiatry: Appropriate mood and affect.    HOSPITAL MEDICATIONS:  MEDICATIONS  (STANDING):  albuterol/ipratropium for Nebulization 3 milliLiter(s) Nebulizer every 6 hours  azithromycin   Tablet 500 milliGRAM(s) Oral daily  budesonide 160 MICROgram(s)/formoterol 4.5 MICROgram(s) Inhaler 2 Puff(s) Inhalation two times a day  cefTRIAXone Injectable. 2000 milliGRAM(s) IV Push every 24 hours  DULoxetine 30 milliGRAM(s) Oral daily  fentaNYL   Patch  25 MICROgram(s)/Hr 1 Patch Transdermal every 72 hours  heparin  Injectable 5000 Unit(s) SubCutaneous every 12 hours  latanoprost 0.005% Ophthalmic Solution 1 Drop(s) Both EYES at bedtime  methylPREDNISolone sodium succinate Injectable 40 milliGRAM(s) IV Push every 8 hours  montelukast 10 milliGRAM(s) Oral daily    MEDICATIONS  (PRN):  gabapentin 300 milliGRAM(s) Oral daily PRN back pain      LABS:                        13.4   6.07  )-----------( 242      ( 23 Dec 2019 13:55 )             40.3     12-23    135  |  104  |  16  ----------------------------<  85  3.9   |  25  |  0.67    Ca    8.8      23 Dec 2019 13:55    TPro  6.7  /  Alb  3.2<L>  /  TBili  0.6  /  DBili  x   /  AST  23  /  ALT  23  /  AlkPhos  74  12-23    PT/INR - ( 23 Dec 2019 13:55 )   PT: 10.8 sec;   INR: 0.97 ratio         PTT - ( 23 Dec 2019 13:55 )  PTT:31.8 sec  Urinalysis Basic - ( 23 Dec 2019 14:57 )    Color: Yellow / Appearance: Clear / S.015 / pH: x  Gluc: x / Ketone: Small  / Bili: Negative / Urobili: Negative mg/dL   Blood: x / Protein: 15 mg/dL / Nitrite: Negative   Leuk Esterase: Negative / RBC: 3-5 /HPF / WBC 0-2   Sq Epi: x / Non Sq Epi: Negative / Bacteria: Negative Pulmonary Consult    Reason for Admission: Resp Distress Comm Aqc Pna, concern for RSV Pneumonia  Fever COPD Exac	  History of Present Illness: 	  Pt is an 86 y/o F with a PMHx of IBS, COPD/asthma, hx of walking Pna in the past  who was  sent to the ED by MD Griffin c/o wheezing and SOB worsening over the past 3 days. Pt saw MD Griffin  and had an outpt CT done which showed apparent new lung nodule concerning for infection and pna.  Pt completed a 10 day course of Prednisone  for asthma.  Then she  notes that symptoms began the day after she finished the Prednisone last week. She reports  that when she coughs, she loses her breath and SOB is exacerbated by ambulating. Pt  did not use her home ProAir inhaler.  She is not on home O2.  Pt c/o  productive yellow cough yellow  and decreased PO intake. Pt denies CP, weakness, dizziness, fever, congestion, or orthopnea. No urinary complaints, no rash.     2019    above history of present illness at the time of the admission noted  Patient was sent to the emergency room by me after seen in the office with symptoms of asthma exacerbation with failed outpatient treatment and CT scan done shows nodular mass/ infiltrate lesions suggested to go to the emergency room.  Patient imaging done as an outpatient shows right middle lobe consolidation/mass  new right upper lobe consolidation /mass and some unchanged bilateral lung nodules with a differential of infectious / inflammatory / neoplastic.   when compared to the previous CT scan some findings are new  During the emergency room patient is found to be positive for RSV infection and has been admitted and treated with IV Solu-Medrol along with intravenous antibiotic with some symptomatic improvement in shortness of breath and continued to wheeze.   she feels comfortable with O2 with nasal cannula and patient never needed oxygen as an outpatient she has severe obstructive asthma with normal diffusion capacity and patient is a nonsmoker    PAST MEDICAL & SURGICAL HISTORY:  COPD (chronic obstructive pulmonary disease)  with obstructive asthma  with airway remodeling  Walking pneumonia  IBS (irritable bowel syndrome)  Chronic back pain  Fracture of left wrist, closed, initial encounter: s/p repair x 2  Closed fracture of right femur, unspecified fracture morphology, unspecified portion of femur, initial encounter   delivery delivered: x 2  Bakers cyst  S/P cataract surgery: left  History of back surgery        FAMILY HISTORY:     noncontributory pertaining to this illness and admission  SOCIAL HISTORY:    No smoking no drug or alcohol abuse .     Allergies    &quot;almost &quot; from MRI dye (Other)  No Known Drug Allergies    Intolerances    oxycodone (Nausea)        REVIEW OF SYSTEMS:  Constitutional:  positive for recent weight loss and fatigue  Eyes: No itching or discharge from the eyes  ENT:  No post nasal drip. No epistaxis. No throat pain. No sore throat. No difficulty swallowing.   CV: No chest pain. No palpitations. No lightheadedness or dizziness.   Resp:  positive for shortness of breath and wheezing and non resolving cough  GI:  has chronic abdominal pain with IBS and closely follows with Cardiology with history of acid reflux  MSK: positive for chronic back pain on opiates with fentanyl  Integumentary: No skin lesions. No pedal edema.  Neurological: No gross motor weakness. No sensory changes.      OBJECTIVE:  Vital Signs Last 24 Hrs  T(C): 36.8 (24 Dec 2019 11:11), Max: 36.8 (24 Dec 2019 11:11)  T(F): 98.2 (24 Dec 2019 11:11), Max: 98.2 (24 Dec 2019 11:11)  HR: 96 (24 Dec 2019 11:11) (76 - 97)  BP: 140/73 (24 Dec 2019 11:11) (119/99 - 146/93)  BP(mean): --  RR: 18 (24 Dec 2019 11:11) (15 - 18)  SpO2: 91% (24 Dec 2019 11:11) (91% - 100%)      PHYSICAL EXAM:  General: Awake, alert, oriented X 3.   HEENT: Atraumatic, normocephalic.   Neck: No JVD no lymphadenopathy   Respiratory: normal vesicular breathing with bilateral wheeze and rhonchi and distant breath sounds  Cardiovascular: S1 S2 normal. No murmurs, rubs or gallops.   Abdomen: Soft, non-tender, non-distended. No organomegaly.  Extremities: Warm to touch. Peripheral pulse palpable. No pedal edema.   Skin: No rashes or skin lesions  Neurological: Motor and sensory examination equal and normal in all four extremities.  Psychiatry: Appropriate mood and affect.    HOSPITAL MEDICATIONS:  MEDICATIONS  (STANDING):  albuterol/ipratropium for Nebulization 3 milliLiter(s) Nebulizer every 6 hours  azithromycin   Tablet 500 milliGRAM(s) Oral daily  budesonide 160 MICROgram(s)/formoterol 4.5 MICROgram(s) Inhaler 2 Puff(s) Inhalation two times a day  cefTRIAXone Injectable. 2000 milliGRAM(s) IV Push every 24 hours  DULoxetine 30 milliGRAM(s) Oral daily  fentaNYL   Patch  25 MICROgram(s)/Hr 1 Patch Transdermal every 72 hours  heparin  Injectable 5000 Unit(s) SubCutaneous every 12 hours  latanoprost 0.005% Ophthalmic Solution 1 Drop(s) Both EYES at bedtime  methylPREDNISolone sodium succinate Injectable 40 milliGRAM(s) IV Push every 8 hours  montelukast 10 milliGRAM(s) Oral daily    MEDICATIONS  (PRN):  gabapentin 300 milliGRAM(s) Oral daily PRN back pain      LABS:                        13.4   6.07  )-----------( 242      ( 23 Dec 2019 13:55 )             40.3         135  |  104  |  16  ----------------------------<  85  3.9   |  25  |  0.67    Ca    8.8      23 Dec 2019 13:55    TPro  6.7  /  Alb  3.2<L>  /  TBili  0.6  /  DBili  x   /  AST  23  /  ALT  23  /  AlkPhos  74  12    PT/INR - ( 23 Dec 2019 13:55 )   PT: 10.8 sec;   INR: 0.97 ratio         PTT - ( 23 Dec 2019 13:55 )  PTT:31.8 sec  Urinalysis Basic - ( 23 Dec 2019 14:57 )    Color: Yellow / Appearance: Clear / S.015 / pH: x  Gluc: x / Ketone: Small  / Bili: Negative / Urobili: Negative mg/dL   Blood: x / Protein: 15 mg/dL / Nitrite: Negative   Leuk Esterase: Negative / RBC: 3-5 /HPF / WBC 0-2   Sq Epi: x / Non Sq Epi: Negative / Bacteria: Negative       patient out patient imaging noted including the report shows right middle lobe mass /consolidation right upper lobe mass /consolidation with bilateral lung nodules

## 2019-12-24 NOTE — CONSULT NOTE ADULT - ASSESSMENT
84 y/o Female with h/o IBS, COPD/asthma, prior Pna was admitted on 12/23 after she was sent to the ED by MD Griffin for wheezing and SOB worsening over the past 3 days. Pt saw MD Griffni  and had an outpt CT done which showed new lung infiltration concerning for infection and pna.  Pt completed a 10 day course of Prednisone  for asthma with improvement in her respiratory condition.. She became more SOB the day after she finished the Prednisone the week PTA. She reports  that when she coughs, she loses her breath and SOB is exacerbated by ambulating. Pt  did not use her home ProAir inhaler. She is not on home O2.  Pt has  productive yellow cough and decreased PO intake. In ER she received cefepime and vancomycin IV.     1. Dyspnea. Pneumonitis with RSV. Probable underlying bacterial superimposed pneumonia. COPD.  -patient has yellow sputum production  -obtain BC x 2, sputum c/s  -start ceftriaxone 2 gm IV qd and azithromycin 500 mg IV qd  -reason for abx use and side effects reviewed with patient; monitor BMP   -respiratory care  -droplet isolation  -old chart reviewed to assess prior cultures  -monitor temps  -f/u CBC  -supportive care  2. Other issues:   -care per medicine
asthma with symptoms of exacerbation precipitated by RSV infection with hypoxemia  New masslike lesions/consolidation infectious/neoplastic  Known patient with bilateral lung nodules being followed up as an outpatient  Hypoxemia secondary to exacerbation of asthma symptoms  Known patient with chronic back pain on opiates    PLAN      suggested to continue with Rocephin and azithromycin with masslike consolidation noted in the right middle lobe and right upper lobe noted in the outpatient imaging   continue with Solu-Medrol increase the dose from 40  Q 12 hourly to q.8 hours hourly with ongoing wheezing and history of severe obstructive airway disease with asthma   Continue with Symbicort and montelukast her baseline medication as an outpatient  Nebulizations along with oxygen supplementation for hypoxemia  continue to monitor for improvement along with the symptomatic relief of the cough   Patient would need short-term CT scan in the next 6-8 weeks to see any decrease in the size of masslike consolidations if continued to be persistent would need CT-guided biopsy  keep on isolation for RSV infection as per the ID recommendations   pain management as per the medicine for her chronic back pain with fentanyl patch

## 2019-12-24 NOTE — CONSULT NOTE ADULT - REASON FOR ADMISSION
Resp Distress  Comm Aqc Pna, concern for RSV Pneumonia  Fever  COPD Exac
Resp Distress  Comm Aqc Pna, concern for RSV Pneumonia  Fever  COPD Exac

## 2019-12-25 LAB — LEGIONELLA AG UR QL: NEGATIVE — SIGNIFICANT CHANGE UP

## 2019-12-25 RX ADMIN — LATANOPROST 1 DROP(S): 0.05 SOLUTION/ DROPS OPHTHALMIC; TOPICAL at 21:31

## 2019-12-25 RX ADMIN — MONTELUKAST 10 MILLIGRAM(S): 4 TABLET, CHEWABLE ORAL at 10:07

## 2019-12-25 RX ADMIN — CEFTRIAXONE 2000 MILLIGRAM(S): 500 INJECTION, POWDER, FOR SOLUTION INTRAMUSCULAR; INTRAVENOUS at 10:07

## 2019-12-25 RX ADMIN — DULOXETINE HYDROCHLORIDE 30 MILLIGRAM(S): 30 CAPSULE, DELAYED RELEASE ORAL at 10:07

## 2019-12-25 RX ADMIN — FENTANYL CITRATE 1 PATCH: 50 INJECTION INTRAVENOUS at 20:26

## 2019-12-25 RX ADMIN — FENTANYL CITRATE 1 PATCH: 50 INJECTION INTRAVENOUS at 10:06

## 2019-12-25 RX ADMIN — BUDESONIDE AND FORMOTEROL FUMARATE DIHYDRATE 2 PUFF(S): 160; 4.5 AEROSOL RESPIRATORY (INHALATION) at 20:15

## 2019-12-25 RX ADMIN — Medication 3 MILLILITER(S): at 07:39

## 2019-12-25 RX ADMIN — BUDESONIDE AND FORMOTEROL FUMARATE DIHYDRATE 2 PUFF(S): 160; 4.5 AEROSOL RESPIRATORY (INHALATION) at 07:40

## 2019-12-25 RX ADMIN — HEPARIN SODIUM 5000 UNIT(S): 5000 INJECTION INTRAVENOUS; SUBCUTANEOUS at 17:46

## 2019-12-25 RX ADMIN — HEPARIN SODIUM 5000 UNIT(S): 5000 INJECTION INTRAVENOUS; SUBCUTANEOUS at 05:12

## 2019-12-25 RX ADMIN — Medication 3 MILLILITER(S): at 01:45

## 2019-12-25 RX ADMIN — Medication 40 MILLIGRAM(S): at 13:48

## 2019-12-25 RX ADMIN — Medication 40 MILLIGRAM(S): at 05:13

## 2019-12-25 RX ADMIN — Medication 3 MILLILITER(S): at 20:09

## 2019-12-25 RX ADMIN — Medication 40 MILLIGRAM(S): at 21:31

## 2019-12-25 RX ADMIN — Medication 3 MILLILITER(S): at 13:33

## 2019-12-25 RX ADMIN — AZITHROMYCIN 500 MILLIGRAM(S): 500 TABLET, FILM COATED ORAL at 10:08

## 2019-12-25 NOTE — PROGRESS NOTE ADULT - ASSESSMENT
-asthma exacerbation, secondary to RSV  -New masslike lesions/consolidation infectious/neoplastic  -Known bilateral lung nodules being followed up as an outpatient  -hypoxemia  -chronic back pain on opiates    PLAN   albuterol/ipratropium for Nebulization 3 milliLiter(s) Nebulizer every 6 hours  azithromycin   Tablet 500 milliGRAM(s) Oral daily  budesonide 160 MICROgram(s)/formoterol 4.5 MICROgram(s) Inhaler 2 Puff(s) Inhalation two times a day  cefTRIAXone Injectable. 2000 milliGRAM(s) IV Push every 24 hours  DULoxetine 30 milliGRAM(s) Oral daily  fentaNYL   Patch  25 MICROgram(s)/Hr 1 Patch Transdermal every 72 hours  heparin  Injectable 5000 Unit(s) SubCutaneous every 12 hours  latanoprost 0.005% Ophthalmic Solution 1 Drop(s) Both EYES at bedtime  methylPREDNISolone sodium succinate Injectable 40 milliGRAM(s) IV Push every 8 hours  montelukast 10 milliGRAM(s) Oral daily    -continue azithromycin, ceftriaxone  -continue with solumedrol q8  -Continue with Symbicort and montelukast   -02 supplementation  -continue duonebs  -follow up CT in 6-8 weeks  -pain management as per medicine for chronic back pain -asthma exacerbation, secondary to RSV  -New masslike lesions/consolidation infectious/neoplastic  -Known bilateral lung nodules being followed up as an outpatient  -hypoxemia  -chronic back pain on opiates    Plan  -continue azithromycin, ceftriaxone  -continue with solumedrol q8  -Continue with Symbicort and montelukast   -02 supplementation  -continue duonebs  -follow up CT in 6-8 weeks  -pain management as per medicine for chronic back pain  -cough suppressant medication, add tessalon or guiafenesin

## 2019-12-25 NOTE — PROGRESS NOTE ADULT - SUBJECTIVE AND OBJECTIVE BOX
HPI: Pt is an 84 y/o F with a PMHx of IBS, COPD/asthma, hx of walking Pna in the past  who was  sent to the ED by MD Griffin c/o wheezing and SOB worsening over the past 3 days. Pt saw MD Griffin  and had an outpt CT done which showed apparent new lung nodule concerning for infection and pna.  Pt completed a 10 day course of Prednisone  for asthma.  Then she  notes that symptoms began the day after she finished the Prednisone last week. She reports  that when she coughs, she loses her breath and SOB is exacerbated by ambulating. Pt  did not use her home ProAir inhaler.  She is not on home O2.  Pt c/o  productive yellow cough yellow  and decreased PO intake. Pt denies CP, weakness, dizziness, fever, congestion, or orthopnea. No urinary complaints, no rash.    : no new complaints    : condition same    PHYSICAL EXAM:      Vital Signs Last 24 Hrs  T(C): 36.8 (25 Dec 2019 11:20), Max: 36.8 (25 Dec 2019 11:20)  T(F): 98.2 (25 Dec 2019 11:20), Max: 98.2 (25 Dec 2019 11:20)  HR: 96 (25 Dec 2019 11:20) (74 - 97)  BP: 132/72 (25 Dec 2019 11:20) (127/77 - 156/89)  BP(mean): --  RR: 18 (25 Dec 2019 11:20) (18 - 18)  SpO2: 96% (25 Dec 2019 11:20) (96% - 98%)    Constitutional: Weak appearing  HEENT: Atraumatic, LEI, Normal, No congestion  Respiratory: Breath Sounds normal, no rhonchi/wheeze  Cardiovascular: N S1S2;   Gastrointestinal: Abdomen soft, non tender, Bowel Sounds present  Extremities: No edema, peripheral pulses present  Neurological: AAO x 3, no gross focal motor deficits  Skin: Non cellulitic, no rash, ulcers  Lymph Nodes: No lymphadenopathy noted  Back: No CVA tenderness   Musculoskeletal: non tender  Breasts: Deferred  Genitourinary: deferred  Rectal: Deferred                          13.4   6.07  )-----------( 242      ( 23 Dec 2019 13:55 )             40.3       CBC Full  -  ( 23 Dec 2019 13:55 )  WBC Count : 6.07 K/uL  RBC Count : 4.47 M/uL  Hemoglobin : 13.4 g/dL  Hematocrit : 40.3 %  Platelet Count - Automated : 242 K/uL  Mean Cell Volume : 90.2 fl  Mean Cell Hemoglobin : 30.0 pg  Mean Cell Hemoglobin Concentration : 33.3 gm/dL  Auto Neutrophil # : 4.18 K/uL  Auto Lymphocyte # : 0.81 K/uL  Auto Monocyte # : 1.01 K/uL  Auto Eosinophil # : 0.01 K/uL  Auto Basophil # : 0.04 K/uL  Auto Neutrophil % : 68.9 %  Auto Lymphocyte % : 13.3 %  Auto Monocyte % : 16.6 %  Auto Eosinophil % : 0.2 %  Auto Basophil % : 0.7 %          135  |  104  |  16  ----------------------------<  85  3.9   |  25  |  0.67    Ca    8.8      23 Dec 2019 13:55    TPro  6.7  /  Alb  3.2<L>  /  TBili  0.6  /  DBili  x   /  AST  23  /  ALT  23  /  AlkPhos  74  12-23      LIVER FUNCTIONS - ( 23 Dec 2019 13:55 )  Alb: 3.2 g/dL / Pro: 6.7 gm/dL / ALK PHOS: 74 U/L / ALT: 23 U/L / AST: 23 U/L / GGT: x             PT/INR - ( 23 Dec 2019 13:55 )   PT: 10.8 sec;   INR: 0.97 ratio         PTT - ( 23 Dec 2019 13:55 )  PTT:31.8 sec          Urinalysis Basic - ( 23 Dec 2019 14:57 )    Color: Yellow / Appearance: Clear / S.015 / pH: x  Gluc: x / Ketone: Small  / Bili: Negative / Urobili: Negative mg/dL   Blood: x / Protein: 15 mg/dL / Nitrite: Negative   Leuk Esterase: Negative / RBC: 3-5 /HPF / WBC 0-2   Sq Epi: x / Non Sq Epi: Negative / Bacteria: Negative            MEDICATIONS  (STANDING):  albuterol/ipratropium for Nebulization 3 milliLiter(s) Nebulizer every 6 hours  azithromycin   Tablet 500 milliGRAM(s) Oral daily  budesonide 160 MICROgram(s)/formoterol 4.5 MICROgram(s) Inhaler 2 Puff(s) Inhalation two times a day  cefTRIAXone Injectable. 2000 milliGRAM(s) IV Push every 24 hours  DULoxetine 30 milliGRAM(s) Oral daily  fentaNYL   Patch  25 MICROgram(s)/Hr 1 Patch Transdermal every 72 hours  heparin  Injectable 5000 Unit(s) SubCutaneous every 12 hours  latanoprost 0.005% Ophthalmic Solution 1 Drop(s) Both EYES at bedtime  methylPREDNISolone sodium succinate Injectable 40 milliGRAM(s) IV Push every 8 hours  montelukast 10 milliGRAM(s) Oral daily

## 2019-12-25 NOTE — PROGRESS NOTE ADULT - SUBJECTIVE AND OBJECTIVE BOX
Date of service: 19 @ 11:49    Lying in bed in NAD  SOB is improving  Has cough    ROS: no fever or chills; denies dizziness, no HA, no abdominal pain, no diarrhea or constipation; no dysuria, no legs pain, no rashes    MEDICATIONS  (STANDING):  albuterol/ipratropium for Nebulization 3 milliLiter(s) Nebulizer every 6 hours  azithromycin   Tablet 500 milliGRAM(s) Oral daily  budesonide 160 MICROgram(s)/formoterol 4.5 MICROgram(s) Inhaler 2 Puff(s) Inhalation two times a day  cefTRIAXone Injectable. 2000 milliGRAM(s) IV Push every 24 hours  DULoxetine 30 milliGRAM(s) Oral daily  fentaNYL   Patch  25 MICROgram(s)/Hr 1 Patch Transdermal every 72 hours  heparin  Injectable 5000 Unit(s) SubCutaneous every 12 hours  latanoprost 0.005% Ophthalmic Solution 1 Drop(s) Both EYES at bedtime  methylPREDNISolone sodium succinate Injectable 40 milliGRAM(s) IV Push every 8 hours  montelukast 10 milliGRAM(s) Oral daily      Vital Signs Last 24 Hrs  T(C): 36.8 (25 Dec 2019 11:20), Max: 36.8 (25 Dec 2019 11:20)  T(F): 98.2 (25 Dec 2019 11:20), Max: 98.2 (25 Dec 2019 11:20)  HR: 96 (25 Dec 2019 11:20) (74 - 97)  BP: 132/72 (25 Dec 2019 11:20) (127/77 - 156/89)  BP(mean): --  RR: 18 (25 Dec 2019 11:20) (18 - 18)  SpO2: 96% (25 Dec 2019 11:20) (96% - 98%)    Physical Exam:      Constitutional: frail looking  HEENT: NC/AT, EOMI, PERRLA, conjunctivae clear  Neck: supple; thyroid not palpable  Back: no tenderness  Respiratory: respiratory effort normal; crackles b/l; few wheezes  Cardiovascular: S1S2 regular, no murmurs  Abdomen: soft, not tender, not distended, positive BS  Genitourinary: no suprapubic tenderness  Lymphatic: no LN palpable  Musculoskeletal: no muscle tenderness, no joint swelling or tenderness  Extremities: no pedal edema  Neurological/ Psychiatric: AxOx3, moving all extremities  Skin: no rashes; no palpable lesions    Labs: reviewed                        13.4   6.07  )-----------( 242      ( 23 Dec 2019 13:55 )             40.3     -    135  |  104  |  16  ----------------------------<  85  3.9   |  25  |  0.67    Ca    8.8      23 Dec 2019 13:55    TPro  6.7  /  Alb  3.2<L>  /  TBili  0.6  /  DBili  x   /  AST  23  /  ALT  23  /  AlkPhos  74  12     LIVER FUNCTIONS - ( 23 Dec 2019 13:55 )  Alb: 3.2 g/dL / Pro: 6.7 gm/dL / ALK PHOS: 74 U/L / ALT: 23 U/L / AST: 23 U/L / GGT: x           Urinalysis Basic - ( 23 Dec 2019 14:57 )    Color: Yellow / Appearance: Clear / S.015 / pH: x  Gluc: x / Ketone: Small  / Bili: Negative / Urobili: Negative mg/dL   Blood: x / Protein: 15 mg/dL / Nitrite: Negative   Leuk Esterase: Negative / RBC: 3-5 /HPF / WBC 0-2   Sq Epi: x / Non Sq Epi: Negative / Bacteria: Negative    Rapid Respiratory Viral Panel (.23.19 @ 14:57)    Rapid RVP Result: Detected:     Resp Syncytial Virus (RapRVP): Detected        Radiology: all available radiological tests reviewed        Advanced directives addressed: full resuscitation

## 2019-12-25 NOTE — PROGRESS NOTE ADULT - SUBJECTIVE AND OBJECTIVE BOX
Subjective:    Review of Systems:  All 10 systems reviewed in detailed and found to be negative with the exception of what has already been described above    Allergies:  &quot;almost &quot; from MRI dye (Other)  No Known Drug Allergies  oxycodone (Nausea)    Meds  MEDICATIONS  (STANDING):  albuterol/ipratropium for Nebulization 3 milliLiter(s) Nebulizer every 6 hours  azithromycin   Tablet 500 milliGRAM(s) Oral daily  budesonide 160 MICROgram(s)/formoterol 4.5 MICROgram(s) Inhaler 2 Puff(s) Inhalation two times a day  cefTRIAXone Injectable. 2000 milliGRAM(s) IV Push every 24 hours  DULoxetine 30 milliGRAM(s) Oral daily  fentaNYL   Patch  25 MICROgram(s)/Hr 1 Patch Transdermal every 72 hours  heparin  Injectable 5000 Unit(s) SubCutaneous every 12 hours  latanoprost 0.005% Ophthalmic Solution 1 Drop(s) Both EYES at bedtime  methylPREDNISolone sodium succinate Injectable 40 milliGRAM(s) IV Push every 8 hours  montelukast 10 milliGRAM(s) Oral daily    MEDICATIONS  (PRN):  gabapentin 300 milliGRAM(s) Oral daily PRN back pain    Physical Exam  T(C): 36.4 (19 @ 05:20), Max: 36.6 (19 @ 21:58)  HR: 85 (19 @ 07:41) (74 - 97)  BP: 127/77 (19 @ 05:20) (127/77 - 156/89)  RR: 18 (19 @ 05:20) (18 - 18)  SpO2: 98% (19 @ 05:20) (98% - 98%)  Gen: Alert, oriented, no distress  HEENT: Anicteric sclera, moist mucous membranes, no JVD, no lymphadenopathy, good dentition  Cardio: Regular rhythm and rate, normal S1S2, no murmurs  Resp: Wheezing bilaterally  GI: Nontender, nondistended, normoactive bowel sounds  Ext: No cyanosis, clubbing or edema  Neuro: Nonfocal    Labs:                        13.4   6.07  )-----------( 242      ( 23 Dec 2019 13:55 )             40.3     -    135  |  104  |  16  ----------------------------<  85  3.9   |  25  |  0.67    Ca    8.8      23 Dec 2019 13:55    TPro  6.7  /  Alb  3.2<L>  /  TBili  0.6  /  DBili  x   /  AST  23  /  ALT  23  /  AlkPhos  74  12-23    PT/INR - ( 23 Dec 2019 13:55 )   PT: 10.8 sec;   INR: 0.97 ratio         PTT - ( 23 Dec 2019 13:55 )  PTT:31.8 sec  Urinalysis Basic - ( 23 Dec 2019 14:57 )    Color: Yellow / Appearance: Clear / S.015 / pH: x  Gluc: x / Ketone: Small  / Bili: Negative / Urobili: Negative mg/dL   Blood: x / Protein: 15 mg/dL / Nitrite: Negative   Leuk Esterase: Negative / RBC: 3-5 /HPF / WBC 0-2   Sq Epi: x / Non Sq Epi: Negative / Bacteria: Negative     patient out patient imaging noted including the report shows right middle lobe mass /consolidation right upper lobe mass /consolidation with bilateral lung nodules Subjective:  Continues to have SOB, wheeze and cough  Denies chest pain    Review of Systems:  All 10 systems reviewed in detailed and found to be negative with the exception of what has already been described above    Allergies:  &quot;almost &quot; from MRI dye (Other)  No Known Drug Allergies  oxycodone (Nausea)    Meds  MEDICATIONS  (STANDING):  albuterol/ipratropium for Nebulization 3 milliLiter(s) Nebulizer every 6 hours  azithromycin   Tablet 500 milliGRAM(s) Oral daily  budesonide 160 MICROgram(s)/formoterol 4.5 MICROgram(s) Inhaler 2 Puff(s) Inhalation two times a day  cefTRIAXone Injectable. 2000 milliGRAM(s) IV Push every 24 hours  DULoxetine 30 milliGRAM(s) Oral daily  fentaNYL   Patch  25 MICROgram(s)/Hr 1 Patch Transdermal every 72 hours  heparin  Injectable 5000 Unit(s) SubCutaneous every 12 hours  latanoprost 0.005% Ophthalmic Solution 1 Drop(s) Both EYES at bedtime  methylPREDNISolone sodium succinate Injectable 40 milliGRAM(s) IV Push every 8 hours  montelukast 10 milliGRAM(s) Oral daily    MEDICATIONS  (PRN):  gabapentin 300 milliGRAM(s) Oral daily PRN back pain    Physical Exam  T(C): 36.4 (19 @ 05:20), Max: 36.6 (19 @ 21:58)  HR: 85 (19 @ 07:41) (74 - 97)  BP: 127/77 (19 @ 05:20) (127/77 - 156/89)  RR: 18 (19 @ 05:20) (18 - 18)  SpO2: 98% (19 @ 05:20) (98% - 98%)  Gen: Alert, oriented, no distress  HEENT: Anicteric sclera, moist mucous membranes, no JVD, no lymphadenopathy, good dentition  Cardio: Regular rhythm and rate, normal S1S2, no murmurs  Resp: Wheezing bilaterally  GI: Nontender, nondistended, normoactive bowel sounds  Ext: No cyanosis, clubbing or edema  Neuro: Nonfocal    Labs:                        13.4   6.07  )-----------( 242      ( 23 Dec 2019 13:55 )             40.3         135  |  104  |  16  ----------------------------<  85  3.9   |  25  |  0.67    Ca    8.8      23 Dec 2019 13:55    TPro  6.7  /  Alb  3.2<L>  /  TBili  0.6  /  DBili  x   /  AST  23  /  ALT  23  /  AlkPhos  74  12-23    PT/INR - ( 23 Dec 2019 13:55 )   PT: 10.8 sec;   INR: 0.97 ratio         PTT - ( 23 Dec 2019 13:55 )  PTT:31.8 sec  Urinalysis Basic - ( 23 Dec 2019 14:57 )    Color: Yellow / Appearance: Clear / S.015 / pH: x  Gluc: x / Ketone: Small  / Bili: Negative / Urobili: Negative mg/dL   Blood: x / Protein: 15 mg/dL / Nitrite: Negative   Leuk Esterase: Negative / RBC: 3-5 /HPF / WBC 0-2   Sq Epi: x / Non Sq Epi: Negative / Bacteria: Negative     patient out patient imaging noted including the report shows right middle lobe mass /consolidation right upper lobe mass /consolidation with bilateral lung nodules

## 2019-12-25 NOTE — PROGRESS NOTE ADULT - ASSESSMENT
Pt is admitted w/    Resp Distress, hypoxia 87%  Comm Aqc Pna, failing out pt tx,     RSV pneumonitis with superimposed Pneumonia  Fever  Astma Exacerbation  New masslike lesions/consolidation infectious/neoplastic    PLAN:   - admit  - iv steroids, nebs  cont rocephin and zithro as per ID  - CT chest in 6-8 weeks to document resolution  - ID and pulm consult appreciated  - DVT prophylaxis: heparin s/q

## 2019-12-25 NOTE — PROGRESS NOTE ADULT - ASSESSMENT
84 y/o Female with h/o IBS, COPD/asthma, prior Pna was admitted on 12/23 after she was sent to the ED by MD Griffin for wheezing and SOB worsening over the past 3 days. Pt saw MD Griffin  and had an outpt CT done which showed new lung infiltration concerning for infection and pna.  Pt completed a 10 day course of Prednisone  for asthma with improvement in her respiratory condition.. She became more SOB the day after she finished the Prednisone the week PTA. She reports  that when she coughs, she loses her breath and SOB is exacerbated by ambulating. Pt  did not use her home ProAir inhaler. She is not on home O2.  Pt has  productive yellow cough and decreased PO intake. In ER she received cefepime and vancomycin IV.     1. Pneumonitis with RSV. Probable underlying bacterial superimposed pneumonia. COPD.  -dyspnea improving  -patient has yellow sputum production  -obtain BC x 2, sputum c/s  -on ceftriaxone 2 gm IV qd and azithromycin 500 mg IV qd # 2  -tolerating abx well so far; no side effects noted  -respiratory care  -droplet isolation  -continue abx coverage  -monitor temps  -f/u CBC  -supportive care  2. Other issues:   -care per medicine

## 2019-12-26 LAB
ANION GAP SERPL CALC-SCNC: 5 MMOL/L — SIGNIFICANT CHANGE UP (ref 5–17)
BUN SERPL-MCNC: 32 MG/DL — HIGH (ref 7–23)
CALCIUM SERPL-MCNC: 9.2 MG/DL — SIGNIFICANT CHANGE UP (ref 8.5–10.1)
CHLORIDE SERPL-SCNC: 107 MMOL/L — SIGNIFICANT CHANGE UP (ref 96–108)
CO2 SERPL-SCNC: 31 MMOL/L — SIGNIFICANT CHANGE UP (ref 22–31)
CREAT SERPL-MCNC: 0.79 MG/DL — SIGNIFICANT CHANGE UP (ref 0.5–1.3)
GLUCOSE SERPL-MCNC: 143 MG/DL — HIGH (ref 70–99)
POTASSIUM SERPL-MCNC: 4.2 MMOL/L — SIGNIFICANT CHANGE UP (ref 3.5–5.3)
POTASSIUM SERPL-SCNC: 4.2 MMOL/L — SIGNIFICANT CHANGE UP (ref 3.5–5.3)
SODIUM SERPL-SCNC: 143 MMOL/L — SIGNIFICANT CHANGE UP (ref 135–145)

## 2019-12-26 PROCEDURE — 71045 X-RAY EXAM CHEST 1 VIEW: CPT | Mod: 26

## 2019-12-26 RX ADMIN — DULOXETINE HYDROCHLORIDE 30 MILLIGRAM(S): 30 CAPSULE, DELAYED RELEASE ORAL at 15:43

## 2019-12-26 RX ADMIN — Medication 40 MILLIGRAM(S): at 06:07

## 2019-12-26 RX ADMIN — MONTELUKAST 10 MILLIGRAM(S): 4 TABLET, CHEWABLE ORAL at 15:44

## 2019-12-26 RX ADMIN — Medication 3 MILLILITER(S): at 19:09

## 2019-12-26 RX ADMIN — Medication 3 MILLILITER(S): at 14:27

## 2019-12-26 RX ADMIN — BUDESONIDE AND FORMOTEROL FUMARATE DIHYDRATE 2 PUFF(S): 160; 4.5 AEROSOL RESPIRATORY (INHALATION) at 08:21

## 2019-12-26 RX ADMIN — HEPARIN SODIUM 5000 UNIT(S): 5000 INJECTION INTRAVENOUS; SUBCUTANEOUS at 06:07

## 2019-12-26 RX ADMIN — LATANOPROST 1 DROP(S): 0.05 SOLUTION/ DROPS OPHTHALMIC; TOPICAL at 21:38

## 2019-12-26 RX ADMIN — FENTANYL CITRATE 1 PATCH: 50 INJECTION INTRAVENOUS at 08:08

## 2019-12-26 RX ADMIN — Medication 40 MILLIGRAM(S): at 15:48

## 2019-12-26 RX ADMIN — Medication 3 MILLILITER(S): at 08:23

## 2019-12-26 RX ADMIN — AZITHROMYCIN 500 MILLIGRAM(S): 500 TABLET, FILM COATED ORAL at 15:43

## 2019-12-26 RX ADMIN — Medication 40 MILLIGRAM(S): at 21:39

## 2019-12-26 RX ADMIN — CEFTRIAXONE 2000 MILLIGRAM(S): 500 INJECTION, POWDER, FOR SOLUTION INTRAMUSCULAR; INTRAVENOUS at 15:44

## 2019-12-26 RX ADMIN — HEPARIN SODIUM 5000 UNIT(S): 5000 INJECTION INTRAVENOUS; SUBCUTANEOUS at 18:20

## 2019-12-26 RX ADMIN — BUDESONIDE AND FORMOTEROL FUMARATE DIHYDRATE 2 PUFF(S): 160; 4.5 AEROSOL RESPIRATORY (INHALATION) at 19:24

## 2019-12-26 RX ADMIN — FENTANYL CITRATE 1 PATCH: 50 INJECTION INTRAVENOUS at 19:30

## 2019-12-26 NOTE — PROGRESS NOTE ADULT - SUBJECTIVE AND OBJECTIVE BOX
SUBJECTIVE     Patient says she is feeling some what better today   even though has cough and wheezing   able to walk better with the 02     PAST MEDICAL & SURGICAL HISTORY:  COPD (chronic obstructive pulmonary disease)  Walking pneumonia  IBS (irritable bowel syndrome)  Chronic back pain  Asthma  Fracture of left wrist, closed, initial encounter: s/p repair x 2  Closed fracture of right femur, unspecified fracture morphology, unspecified portion of femur, initial encounter   delivery delivered: x 2  Bakers cyst  S/P cataract surgery: left  History of back surgery    OBJECTIVE   Vital Signs Last 24 Hrs  T(C): 36.8 (26 Dec 2019 05:41), Max: 36.9 (25 Dec 2019 18:23)  T(F): 98.2 (26 Dec 2019 05:41), Max: 98.5 (25 Dec 2019 18:23)  HR: 74 (26 Dec 2019 08:23) (72 - 103)  BP: 127/67 (26 Dec 2019 05:41) (117/85 - 132/72)  BP(mean): --  RR: 18 (26 Dec 2019 05:41) (18 - 18)  SpO2: 100% (26 Dec 2019 08:23) (96% - 100%)    Review of systems   as dictated in the history of present illness with the review of other systems non contributory     PHYSICAL EXAM:  Constitutional: , awake and alert, not in distress.  HEENT: Normo cephalic atraumatic  Neck: Soft and supple, No J.V.D   Respiratory: vesicular breathing has distant breath sounds with the wheeze and rhonchi   Cardiovascular: S1 and S2, regular rate .   Gastrointestinal:  soft, nontender,   Extremities: No  edema or calf tenderness .  Neurological: No new  focal deficits.    MEDICATIONS  (STANDING):  albuterol/ipratropium for Nebulization 3 milliLiter(s) Nebulizer every 6 hours  azithromycin   Tablet 500 milliGRAM(s) Oral daily  budesonide 160 MICROgram(s)/formoterol 4.5 MICROgram(s) Inhaler 2 Puff(s) Inhalation two times a day  cefTRIAXone Injectable. 2000 milliGRAM(s) IV Push every 24 hours  DULoxetine 30 milliGRAM(s) Oral daily  fentaNYL   Patch  25 MICROgram(s)/Hr 1 Patch Transdermal every 72 hours  heparin  Injectable 5000 Unit(s) SubCutaneous every 12 hours  latanoprost 0.005% Ophthalmic Solution 1 Drop(s) Both EYES at bedtime  methylPREDNISolone sodium succinate Injectable 40 milliGRAM(s) IV Push every 8 hours  montelukast 10 milliGRAM(s) Oral daily                Culture - Urine (collected 23 Dec 2019 14:57)  Source: .Urine None  Final Report (24 Dec 2019 21:02):    <10,000 CFU/mL Normal Urogenital Karishma    Culture - Blood (collected 23 Dec 2019 13:55)  Source: .Blood Blood-Venous  Preliminary Report (25 Dec 2019 01:02):    No growth to date.    Culture - Blood (collected 23 Dec 2019 13:55)  Source: .Blood Blood-Peripheral  Preliminary Report (25 Dec 2019 01:02):    No growth to date.

## 2019-12-26 NOTE — PROGRESS NOTE ADULT - ASSESSMENT
-asthma exacerbation, secondary to RSV  -New masslike lesions/consolidation/ infectious/neoplastic  -Known bilateral lung nodules being followed up as an outpatient  -hypoxemia  -chronic back pain on opiates    Plan  -continue azithromycin, ceftriaxone  - taper the solumedrol to twice daily as the patient notices some improvement   -Continue with Symbicort and montelukast   -02 supplementation and ambulation   -continue duonebs  -follow up CT in 6-8 weeks  -pain management as per medicine for chronic back pain  - symptomatic relief for the cough

## 2019-12-26 NOTE — PROGRESS NOTE ADULT - ASSESSMENT
84 y/o Female with h/o IBS, COPD/asthma, prior Pna was admitted on 12/23 after she was sent to the ED by MD Griffin for wheezing and SOB worsening over the past 3 days. Pt saw MD Griffin  and had an outpt CT done which showed new lung infiltration concerning for infection and pna.  Pt completed a 10 day course of Prednisone  for asthma with improvement in her respiratory condition.. She became more SOB the day after she finished the Prednisone the week PTA. She reports  that when she coughs, she loses her breath and SOB is exacerbated by ambulating. Pt  did not use her home ProAir inhaler. She is not on home O2.  Pt has  productive yellow cough and decreased PO intake. In ER she received cefepime and vancomycin IV.     1. Pneumonitis with RSV. Probable underlying bacterial superimposed pneumonia. COPD.  -dyspnea improving  -patient has yellow sputum production  -f/u BC x 2, sputum c/s  -on ceftriaxone 2 gm IV qd and azithromycin 500 mg IV qd # 3  -tolerating abx well so far; no side effects noted  -respiratory care  -repeat CXR  -droplet isolation  -continue abx coverage  -monitor temps  -f/u CBC  -supportive care  2. Other issues:   -care per medicine

## 2019-12-26 NOTE — PROGRESS NOTE ADULT - SUBJECTIVE AND OBJECTIVE BOX
Date of service: 19 @ 12:23    Has SOB  Weak looking  Has dry cough    ROS: no fever or chills; denies dizziness, no HA, no abdominal pain, no diarrhea or constipation; no dysuria, no legs pain, no rashes    MEDICATIONS  (STANDING):  albuterol/ipratropium for Nebulization 3 milliLiter(s) Nebulizer every 6 hours  azithromycin   Tablet 500 milliGRAM(s) Oral daily  budesonide 160 MICROgram(s)/formoterol 4.5 MICROgram(s) Inhaler 2 Puff(s) Inhalation two times a day  cefTRIAXone Injectable. 2000 milliGRAM(s) IV Push every 24 hours  DULoxetine 30 milliGRAM(s) Oral daily  fentaNYL   Patch  25 MICROgram(s)/Hr 1 Patch Transdermal every 72 hours  heparin  Injectable 5000 Unit(s) SubCutaneous every 12 hours  latanoprost 0.005% Ophthalmic Solution 1 Drop(s) Both EYES at bedtime  methylPREDNISolone sodium succinate Injectable 40 milliGRAM(s) IV Push every 8 hours  montelukast 10 milliGRAM(s) Oral daily      Vital Signs Last 24 Hrs  T(C): 37 (26 Dec 2019 11:20), Max: 37 (26 Dec 2019 11:20)  T(F): 98.6 (26 Dec 2019 11:20), Max: 98.6 (26 Dec 2019 11:20)  HR: 87 (26 Dec 2019 11:20) (72 - 103)  BP: 138/70 (26 Dec 2019 11:20) (117/85 - 138/70)  BP(mean): --  RR: 18 (26 Dec 2019 11:20) (18 - 18)  SpO2: 95% (26 Dec 2019 11:20) (95% - 100%)    Physical Exam:      Constitutional: frail looking  HEENT: NC/AT, EOMI, PERRLA, conjunctivae clear  Neck: supple; thyroid not palpable  Back: no tenderness  Respiratory: respiratory effort normal; crackles b/l; few wheezes  Cardiovascular: S1S2 regular, no murmurs  Abdomen: soft, not tender, not distended, positive BS  Genitourinary: no suprapubic tenderness  Lymphatic: no LN palpable  Musculoskeletal: no muscle tenderness, no joint swelling or tenderness  Extremities: no pedal edema  Neurological/ Psychiatric: AxOx3, moving all extremities  Skin: no rashes; no palpable lesions    Labs: reviewed                        13.4   6.07  )-----------( 242      ( 23 Dec 2019 13:55 )             40.3     12    135  |  104  |  16  ----------------------------<  85  3.9   |  25  |  0.67    Ca    8.8      23 Dec 2019 13:55    TPro  6.7  /  Alb  3.2<L>  /  TBili  0.6  /  DBili  x   /  AST  23  /  ALT  23  /  AlkPhos  74  12-23     LIVER FUNCTIONS - ( 23 Dec 2019 13:55 )  Alb: 3.2 g/dL / Pro: 6.7 gm/dL / ALK PHOS: 74 U/L / ALT: 23 U/L / AST: 23 U/L / GGT: x           Urinalysis Basic - ( 23 Dec 2019 14:57 )    Color: Yellow / Appearance: Clear / S.015 / pH: x  Gluc: x / Ketone: Small  / Bili: Negative / Urobili: Negative mg/dL   Blood: x / Protein: 15 mg/dL / Nitrite: Negative   Leuk Esterase: Negative / RBC: 3-5 /HPF / WBC 0-2   Sq Epi: x / Non Sq Epi: Negative / Bacteria: Negative    Rapid Respiratory Viral Panel (..19 @ 14:57)    Rapid RVP Result: Detected:     Resp Syncytial Virus (RapRVP): Detected      Culture - Urine (collected 23 Dec 2019 14:57)  Source: .Urine None  Final Report (24 Dec 2019 21:02):    <10,000 CFU/mL Normal Urogenital Karishma    Culture - Blood (collected 23 Dec 2019 13:55)  Source: .Blood Blood-Venous  Preliminary Report (25 Dec 2019 01:02):    No growth to date.    Culture - Blood (collected 23 Dec 2019 13:55)  Source: .Blood Blood-Peripheral  Preliminary Report (25 Dec 2019 01:02):    No growth to date.    Radiology: all available radiological tests reviewed        Advanced directives addressed: full resuscitation

## 2019-12-26 NOTE — CHART NOTE - NSCHARTNOTEFT_GEN_A_CORE
Upon Nutritional Assessment by the Registered Dietitian your patient was determined to meet criteria / has evidence of the following diagnosis/diagnoses:          [ ]  Mild Protein Calorie Malnutrition        [ ]  Moderate Protein Calorie Malnutrition        [x ] Severe Protein Calorie Malnutrition        [ ] Unspecified Protein Calorie Malnutrition        [x ] Underweight / BMI <19        [ ] Morbid Obesity / BMI > 40      Findings:    Pt meets criteria for severe malnutrition in the context of chronic illness AEB severe muscle wasting, severe fat depletion.      Findings as based on:  •  Comprehensive nutrition assessment and consultation  •  Calorie counts (nutrient intake analysis)  •  Food acceptance and intake status from observations by staff  •  Follow up  •  Patient education  •  Intervention secondary to interdisciplinary rounds  •   concerns      Treatment:      1. liberalize diet to low Na   2. add ensure enlive BID   3. provide assistance w/ meals PRN   4. weekly wt   5. encourage supplement between meals   6. add MVI w/ minerals      The following diet has been recommended:      PROVIDER Section:     By signing this assessment you are acknowledging and agree with the diagnosis/diagnoses assigned by the Registered Dietitian    Comments:

## 2019-12-26 NOTE — DIETITIAN INITIAL EVALUATION ADULT. - PERTINENT MEDS FT
MEDICATIONS  (STANDING):  albuterol/ipratropium for Nebulization 3 milliLiter(s) Nebulizer every 6 hours  azithromycin   Tablet 500 milliGRAM(s) Oral daily  budesonide 160 MICROgram(s)/formoterol 4.5 MICROgram(s) Inhaler 2 Puff(s) Inhalation two times a day  cefTRIAXone Injectable. 2000 milliGRAM(s) IV Push every 24 hours  DULoxetine 30 milliGRAM(s) Oral daily  fentaNYL   Patch  25 MICROgram(s)/Hr 1 Patch Transdermal every 72 hours  heparin  Injectable 5000 Unit(s) SubCutaneous every 12 hours  latanoprost 0.005% Ophthalmic Solution 1 Drop(s) Both EYES at bedtime  methylPREDNISolone sodium succinate Injectable 40 milliGRAM(s) IV Push every 8 hours  montelukast 10 milliGRAM(s) Oral daily    MEDICATIONS  (PRN):  benzonatate 100 milliGRAM(s) Oral three times a day PRN Cough  gabapentin 300 milliGRAM(s) Oral daily PRN back pain

## 2019-12-26 NOTE — DIETITIAN INITIAL EVALUATION ADULT. - PERTINENT LABORATORY DATA
12-26 Na143 mmol/L Glu 143 mg/dL<H> K+ 4.2 mmol/L Cr  0.79 mg/dL BUN 32 mg/dL<H> Phos n/a   Alb n/a   PAB n/a

## 2019-12-26 NOTE — DIETITIAN INITIAL EVALUATION ADULT. - OTHER INFO
Pt is a 86 yo F w/ PMH  IBS, COPD/asthma, chr back pain,  hx of walking Pna in the past  who was  sent to the ED by MD Griffin c/o wheezing and SOB worsening over the past 3 days. Pt saw MD Griffin  and had an outpt CT done which showed apparent new lung nodule concerning for infection and pna.  Pt completed a 10 day course of Prednisone  for asthma.  Then she  notes that symptoms began the day after she finished the Prednisone last week. She reports  that when she coughs, she loses her breath and SOB is exacerbated by ambulating. Pt  did not use her home ProAir inhaler.  She is not on home O2.  Pt c/o  productive yellow cough yellow  and decreased PO intake. Pt denies CP, weakness, dizziness, fever, congestion, or orthopnea. No urinary complaints, no rash.      Pt noted to be admitted w/ Comm Aqc Pna, failing outpt tx,  concern for RSV Pneumonia and COPD exacerbation. Upon visit pt lying in bed states she if feeling better. Pt endorses improved appetite, details below. Pt denies chewing/swallowing difficulties. As per pt she has no c/o abd pain, n/v/c/d at this time. Last BM noted 12/26.

## 2019-12-26 NOTE — DIETITIAN INITIAL EVALUATION ADULT. - ADD RECOMMEND
1. liberalize diet to low Na 2. add ensure enlive BID 3. provide assistance w/ meals PRN 4. weekly wt 5. encourage supplement between meals 6. add MVI w/ minerals

## 2019-12-26 NOTE — PROGRESS NOTE ADULT - SUBJECTIVE AND OBJECTIVE BOX
HPI: Pt is an 86 y/o F with a PMHx of IBS, COPD/asthma, hx of walking Pna in the past  who was  sent to the ED by MD rGiffin c/o wheezing and SOB worsening over the past 3 days. Pt saw MD Griffin  and had an outpt CT done which showed apparent new lung nodule concerning for infection and pna.  Pt completed a 10 day course of Prednisone  for asthma.  Then she  notes that symptoms began the day after she finished the Prednisone last week. She reports  that when she coughs, she loses her breath and SOB is exacerbated by ambulating. Pt  did not use her home ProAir inhaler.  She is not on home O2.  Pt c/o  productive yellow cough yellow  and decreased PO intake. Pt denies CP, weakness, dizziness, fever, congestion, or orthopnea. No urinary complaints, no rash.    12/24: no new complaints    12/25: condition same    12/26: sob persists  repeat cxr shows right lobar mass ? Cncer    PHYSICAL EXAM:      Vital Signs Last 24 Hrs  T(C): 37 (26 Dec 2019 11:20), Max: 37 (26 Dec 2019 11:20)  T(F): 98.6 (26 Dec 2019 11:20), Max: 98.6 (26 Dec 2019 11:20)  HR: 72 (26 Dec 2019 14:30) (72 - 87)  BP: 138/70 (26 Dec 2019 11:20) (127/67 - 138/70)  BP(mean): --  RR: 18 (26 Dec 2019 11:20) (18 - 18)  SpO2: 100% (26 Dec 2019 14:30) (95% - 100%)    Constitutional: Weak appearing  HEENT: Atraumatic, LEI, Normal, No congestion  Respiratory: Breath Sounds normal, no rhonchi/wheeze  Cardiovascular: N S1S2;   Gastrointestinal: Abdomen soft, non tender, Bowel Sounds present  Extremities: No edema, peripheral pulses present  Neurological: AAO x 3, no gross focal motor deficits  Skin: Non cellulitic, no rash, ulcers  Lymph Nodes: No lymphadenopathy noted  Back: No CVA tenderness   Musculoskeletal: non tender  Breasts: Deferred  Genitourinary: deferred  Rectal: Deferred           Lab Results:  CBC    .		Differential:	[] Automated		[] Manual  Chemistry    12-26    143  |  107  |  32<H>  ----------------------------<  143<H>  4.2   |  31  |  0.79    Ca    9.2      26 Dec 2019 13:05                  MICROBIOLOGY/CULTURES:  Culture Results:   <10,000 CFU/mL Normal Urogenital Karishma (12-23 @ 14:57)  Culture Results:   No growth to date. (12-23 @ 13:55)  Culture Results:   No growth to date. (12-23 @ 13:55)              MEDICATIONS  (STANDING):  albuterol/ipratropium for Nebulization 3 milliLiter(s) Nebulizer every 6 hours  azithromycin   Tablet 500 milliGRAM(s) Oral daily  budesonide 160 MICROgram(s)/formoterol 4.5 MICROgram(s) Inhaler 2 Puff(s) Inhalation two times a day  cefTRIAXone Injectable. 2000 milliGRAM(s) IV Push every 24 hours  DULoxetine 30 milliGRAM(s) Oral daily  fentaNYL   Patch  25 MICROgram(s)/Hr 1 Patch Transdermal every 72 hours  heparin  Injectable 5000 Unit(s) SubCutaneous every 12 hours  latanoprost 0.005% Ophthalmic Solution 1 Drop(s) Both EYES at bedtime  methylPREDNISolone sodium succinate Injectable 40 milliGRAM(s) IV Push every 8 hours  montelukast 10 milliGRAM(s) Oral daily

## 2019-12-26 NOTE — PROGRESS NOTE ADULT - ASSESSMENT
Pt is admitted w/    Resp Distress, hypoxia 87%  Comm Aqc Pna, failing out pt tx,     RSV pneumonitis with superimposed Pneumonia  Fever  Astma Exacerbation  New masslike lesions/consolidation infectious/neoplastic  r/o lung CA    PLAN:   - admit  - iv steroids, nebs  cont rocephin and zithro as per ID  - CT chest in 6-8 weeks to document resolution  - ID and pulm consult appreciated  - DVT prophylaxis: heparin s/q

## 2019-12-26 NOTE — DIETITIAN INITIAL EVALUATION ADULT. - PHYSICAL APPEARANCE
underweight/other (specify)/BMI 16.6 NFPE performed and significant for muscle wasting: severe: deltoid, clavicle, interosseous, quad, patellar, and calf regions; moderate: temporal; fat depletion: severe: occipital, buccal, tricep, ribs.   yissel 18  no noted edema or skin breakdown.

## 2019-12-26 NOTE — DIETITIAN INITIAL EVALUATION ADULT. - MALNUTRITION
Pt meets criteria for severe malnutrition in the context of chronic illness AEB severe muscle wasting, severe fat depletion.

## 2019-12-26 NOTE — DIETITIAN INITIAL EVALUATION ADULT. - ENERGY INTAKE
As per pt she normally has a good appetite however since Saturday she has been able to consume very little PO d/t difficulty breathing. Pt reports that she normally eats 3 meals/day breakfast eggs, toast fruit; lunch: cream cheese on crackers; dinner protein, vegetable, starch. Based on dietary recall pt possibly meeting ENN however lunch appears minmal. Recommend ensure enlive BID and meals w/ adequate protein. Pt agreeable. Since saturday (12/21) intake has been poor (x 5 days)/Poor (<50%)

## 2019-12-27 ENCOUNTER — TRANSCRIPTION ENCOUNTER (OUTPATIENT)
Age: 84
End: 2019-12-27

## 2019-12-27 RX ADMIN — Medication 3 MILLILITER(S): at 19:52

## 2019-12-27 RX ADMIN — DULOXETINE HYDROCHLORIDE 30 MILLIGRAM(S): 30 CAPSULE, DELAYED RELEASE ORAL at 12:22

## 2019-12-27 RX ADMIN — HEPARIN SODIUM 5000 UNIT(S): 5000 INJECTION INTRAVENOUS; SUBCUTANEOUS at 18:32

## 2019-12-27 RX ADMIN — Medication 40 MILLIGRAM(S): at 05:01

## 2019-12-27 RX ADMIN — MONTELUKAST 10 MILLIGRAM(S): 4 TABLET, CHEWABLE ORAL at 12:22

## 2019-12-27 RX ADMIN — AZITHROMYCIN 500 MILLIGRAM(S): 500 TABLET, FILM COATED ORAL at 12:22

## 2019-12-27 RX ADMIN — CEFTRIAXONE 2000 MILLIGRAM(S): 500 INJECTION, POWDER, FOR SOLUTION INTRAMUSCULAR; INTRAVENOUS at 10:27

## 2019-12-27 RX ADMIN — BUDESONIDE AND FORMOTEROL FUMARATE DIHYDRATE 2 PUFF(S): 160; 4.5 AEROSOL RESPIRATORY (INHALATION) at 08:05

## 2019-12-27 RX ADMIN — HEPARIN SODIUM 5000 UNIT(S): 5000 INJECTION INTRAVENOUS; SUBCUTANEOUS at 05:01

## 2019-12-27 RX ADMIN — FENTANYL CITRATE 1 PATCH: 50 INJECTION INTRAVENOUS at 06:26

## 2019-12-27 RX ADMIN — LATANOPROST 1 DROP(S): 0.05 SOLUTION/ DROPS OPHTHALMIC; TOPICAL at 22:11

## 2019-12-27 RX ADMIN — Medication 3 MILLILITER(S): at 08:12

## 2019-12-27 RX ADMIN — FENTANYL CITRATE 1 PATCH: 50 INJECTION INTRAVENOUS at 19:30

## 2019-12-27 RX ADMIN — Medication 3 MILLILITER(S): at 15:51

## 2019-12-27 RX ADMIN — Medication 40 MILLIGRAM(S): at 22:11

## 2019-12-27 RX ADMIN — Medication 40 MILLIGRAM(S): at 15:25

## 2019-12-27 RX ADMIN — BUDESONIDE AND FORMOTEROL FUMARATE DIHYDRATE 2 PUFF(S): 160; 4.5 AEROSOL RESPIRATORY (INHALATION) at 19:53

## 2019-12-27 NOTE — DISCHARGE NOTE NURSING/CASE MANAGEMENT/SOCIAL WORK - PATIENT PORTAL LINK FT
You can access the FollowMyHealth Patient Portal offered by [facility] by registering at the following website: [email or web address]. By joining [facility]’s FollowMyHealth portal, you will also be able to view your health information using other applications (apps) compatible with our system.

## 2019-12-27 NOTE — PROGRESS NOTE ADULT - SUBJECTIVE AND OBJECTIVE BOX
SUBJECTIVE       PAST MEDICAL & SURGICAL HISTORY:  COPD (chronic obstructive pulmonary disease)  Walking pneumonia  IBS (irritable bowel syndrome)  Chronic back pain  Asthma  Fracture of left wrist, closed, initial encounter: s/p repair x 2  Closed fracture of right femur, unspecified fracture morphology, unspecified portion of femur, initial encounter   delivery delivered: x 2  Bakers cyst  S/P cataract surgery: left  History of back surgery    OBJECTIVE   Vital Signs Last 24 Hrs  T(C): 37 (27 Dec 2019 05:43), Max: 37 (26 Dec 2019 11:20)  T(F): 98.6 (27 Dec 2019 05:43), Max: 98.6 (26 Dec 2019 11:20)  HR: 79 (27 Dec 2019 08:12) (72 - 95)  BP: 142/70 (27 Dec 2019 05:43) (133/72 - 142/70)  BP(mean): --  RR: 17 (27 Dec 2019 05:43) (17 - 18)  SpO2: 94% (27 Dec 2019 08:12) (94% - 100%)    Review of systems   as dictated in the history of present illness with the review of other systems non contributory     PHYSICAL EXAM:  Constitutional: , awake and alert, not in distress.  HEENT: Normo cephalic atraumatic  Neck: Soft and supple, No J.V.D   Respiratory: vesicular breathing , No wheezing, rales or rhonchi.   Cardiovascular: S1 and S2, regular rate .   Gastrointestinal:  soft, nontender,   Extremities: No  edema or calf tenderness .  Neurological: No new  focal deficits.    MEDICATIONS  (STANDING):  albuterol/ipratropium for Nebulization 3 milliLiter(s) Nebulizer every 6 hours  azithromycin   Tablet 500 milliGRAM(s) Oral daily  budesonide 160 MICROgram(s)/formoterol 4.5 MICROgram(s) Inhaler 2 Puff(s) Inhalation two times a day  cefTRIAXone Injectable. 2000 milliGRAM(s) IV Push every 24 hours  DULoxetine 30 milliGRAM(s) Oral daily  fentaNYL   Patch  25 MICROgram(s)/Hr 1 Patch Transdermal every 72 hours  heparin  Injectable 5000 Unit(s) SubCutaneous every 12 hours  latanoprost 0.005% Ophthalmic Solution 1 Drop(s) Both EYES at bedtime  methylPREDNISolone sodium succinate Injectable 40 milliGRAM(s) IV Push every 8 hours  montelukast 10 milliGRAM(s) Oral daily        12-26    143  |  107  |  32<H>  ----------------------------<  143<H>  4.2   |  31  |  0.79    Ca    9.2      26 Dec 2019 13:05 SUBJECTIVE      patient feels she is slowly improving but still has some cough and wheezing and feels she needed oxygen with ambulation and follow-up chest x-ray noted still shows right-sided lesion that needs to be followed up for complete resolution   no fever spikes unable to sleep with room mate      PAST MEDICAL & SURGICAL HISTORY:  COPD (chronic obstructive pulmonary disease)  Walking pneumonia  IBS (irritable bowel syndrome)  Chronic back pain  Asthma  Fracture of left wrist, closed, initial encounter: s/p repair x 2  Closed fracture of right femur, unspecified fracture morphology, unspecified portion of femur, initial encounter   delivery delivered: x 2  Bakers cyst  S/P cataract surgery: left  History of back surgery    OBJECTIVE   Vital Signs Last 24 Hrs  T(C): 37 (27 Dec 2019 05:43), Max: 37 (26 Dec 2019 11:20)  T(F): 98.6 (27 Dec 2019 05:43), Max: 98.6 (26 Dec 2019 11:20)  HR: 79 (27 Dec 2019 08:12) (72 - 95)  BP: 142/70 (27 Dec 2019 05:43) (133/72 - 142/70)  BP(mean): --  RR: 17 (27 Dec 2019 05:43) (17 - 18)  SpO2: 94% (27 Dec 2019 08:12) (94% - 100%)    Review of systems   as dictated in the history of present illness with the review of other systems non contributory     PHYSICAL EXAM:  Constitutional: , awake and alert, not in distress. and breathing comfortably on oxygen by nasal cannula  HEENT: Normo cephalic atraumatic  Neck: Soft and supple, No J.V.D   Respiratory: vesicular breathing ,  has mild wheeze with rhonchi  Cardiovascular: S1 and S2, regular rate .   Gastrointestinal:  soft, nontender,   Extremities: No  edema or calf tenderness .  Neurological: No new  focal deficits.    MEDICATIONS  (STANDING):  albuterol/ipratropium for Nebulization 3 milliLiter(s) Nebulizer every 6 hours  azithromycin   Tablet 500 milliGRAM(s) Oral daily  budesonide 160 MICROgram(s)/formoterol 4.5 MICROgram(s) Inhaler 2 Puff(s) Inhalation two times a day  cefTRIAXone Injectable. 2000 milliGRAM(s) IV Push every 24 hours  DULoxetine 30 milliGRAM(s) Oral daily  fentaNYL   Patch  25 MICROgram(s)/Hr 1 Patch Transdermal every 72 hours  heparin  Injectable 5000 Unit(s) SubCutaneous every 12 hours  latanoprost 0.005% Ophthalmic Solution 1 Drop(s) Both EYES at bedtime  methylPREDNISolone sodium succinate Injectable 40 milliGRAM(s) IV Push every 8 hours  montelukast 10 milliGRAM(s) Oral daily            143  |  107  |  32<H>  ----------------------------<  143<H>  4.2   |  31  |  0.79    Ca    9.2      26 Dec 2019 13:05

## 2019-12-27 NOTE — PROGRESS NOTE ADULT - SUBJECTIVE AND OBJECTIVE BOX
HPI: Pt is an 86 y/o F with a PMHx of IBS, COPD/asthma, hx of walking Pna in the past  who was  sent to the ED by MD Griffin c/o wheezing and SOB worsening over the past 3 days. Pt saw MD Griffin  and had an outpt CT done which showed apparent new lung nodule concerning for infection and pna.  Pt completed a 10 day course of Prednisone  for asthma.  Then she  notes that symptoms began the day after she finished the Prednisone last week. She reports  that when she coughs, she loses her breath and SOB is exacerbated by ambulating. Pt  did not use her home ProAir inhaler.  She is not on home O2.  Pt c/o  productive yellow cough yellow  and decreased PO intake. Pt denies CP, weakness, dizziness, fever, congestion, or orthopnea. No urinary complaints, no rash.    12/24: no new complaints    12/25: condition same    12/26: sob persists  repeat cxr shows right lobar mass ? Cancer    12/27: no complaints    PHYSICAL EXAM:      Vital Signs Last 24 Hrs  T(C): 36.8 (27 Dec 2019 11:52), Max: 37 (27 Dec 2019 05:43)  T(F): 98.2 (27 Dec 2019 11:52), Max: 98.6 (27 Dec 2019 05:43)  HR: 80 (27 Dec 2019 15:51) (79 - 95)  BP: 133/78 (27 Dec 2019 11:52) (133/72 - 142/70)  BP(mean): --  RR: 20 (27 Dec 2019 11:52) (17 - 20)  SpO2: 95% (27 Dec 2019 15:51) (94% - 96%)    Constitutional: Weak appearing  HEENT: Atraumatic, LEI, Normal, No congestion  Respiratory: Breath Sounds normal, no rhonchi/wheeze  Cardiovascular: N S1S2;   Gastrointestinal: Abdomen soft, non tender, Bowel Sounds present  Extremities: No edema, peripheral pulses present  Neurological: AAO x 3, no gross focal motor deficits  Skin: Non cellulitic, no rash, ulcers  Lymph Nodes: No lymphadenopathy noted  Back: No CVA tenderness   Musculoskeletal: non tender  Breasts: Deferred  Genitourinary: deferred  Rectal: Deferred                   Lab Results:  CBC    .		Differential:	[] Automated		[] Manual  Chemistry    12-26    143  |  107  |  32<H>  ----------------------------<  143<H>  4.2   |  31  |  0.79    Ca    9.2      26 Dec 2019 13:05                  MICROBIOLOGY/CULTURES:  Culture Results:   <10,000 CFU/mL Normal Urogenital Karishma (12-23 @ 14:57)  Culture Results:   No growth to date. (12-23 @ 13:55)  Culture Results:   No growth to date. (12-23 @ 13:55)                  MEDICATIONS  (STANDING):  albuterol/ipratropium for Nebulization 3 milliLiter(s) Nebulizer every 6 hours  budesonide 160 MICROgram(s)/formoterol 4.5 MICROgram(s) Inhaler 2 Puff(s) Inhalation two times a day  cefTRIAXone Injectable. 2000 milliGRAM(s) IV Push every 24 hours  DULoxetine 30 milliGRAM(s) Oral daily  fentaNYL   Patch  25 MICROgram(s)/Hr 1 Patch Transdermal every 72 hours  heparin  Injectable 5000 Unit(s) SubCutaneous every 12 hours  latanoprost 0.005% Ophthalmic Solution 1 Drop(s) Both EYES at bedtime  methylPREDNISolone sodium succinate Injectable 40 milliGRAM(s) IV Push every 8 hours  montelukast 10 milliGRAM(s) Oral daily

## 2019-12-27 NOTE — PROGRESS NOTE ADULT - ASSESSMENT
84 y/o Female with h/o IBS, COPD/asthma, prior Pna was admitted on 12/23 after she was sent to the ED by MD Griffin for wheezing and SOB worsening over the past 3 days. Pt saw MD Griffin  and had an outpt CT done which showed new lung infiltration concerning for infection and pna.  Pt completed a 10 day course of Prednisone  for asthma with improvement in her respiratory condition.. She became more SOB the day after she finished the Prednisone the week PTA. She reports  that when she coughs, she loses her breath and SOB is exacerbated by ambulating. Pt  did not use her home ProAir inhaler. She is not on home O2.  Pt has  productive yellow cough and decreased PO intake. In ER she received cefepime and vancomycin IV.     1. Pneumonitis with RSV. Probable underlying bacterial superimposed pneumonia. COPD.  -dyspnea improving  -patient has yellow sputum production  -f/u BC x 2, sputum c/s  -on ceftriaxone 2 gm IV qd and azithromycin 500 mg IV qd # 4  -tolerating abx well so far; no side effects noted  -d/c azithromycin  -respiratory care  -repeat CXR reviewed; to consider pulmonary evaluation due to concern for underlying mass  -droplet isolation  -continue abx coverage  -monitor temps  -f/u CBC  -supportive care  2. Other issues:   -care per medicine

## 2019-12-27 NOTE — PROGRESS NOTE ADULT - SUBJECTIVE AND OBJECTIVE BOX
Date of service: 19 @ 15:46    Lying in bed in NAD   Has SOB  Dry cough    ROS: no fever or chills; denies dizziness, no HA, no abdominal pain, no diarrhea or constipation; no dysuria, no legs pain, no rashes    MEDICATIONS  (STANDING):  albuterol/ipratropium for Nebulization 3 milliLiter(s) Nebulizer every 6 hours  azithromycin   Tablet 500 milliGRAM(s) Oral daily  budesonide 160 MICROgram(s)/formoterol 4.5 MICROgram(s) Inhaler 2 Puff(s) Inhalation two times a day  cefTRIAXone Injectable. 2000 milliGRAM(s) IV Push every 24 hours  DULoxetine 30 milliGRAM(s) Oral daily  fentaNYL   Patch  25 MICROgram(s)/Hr 1 Patch Transdermal every 72 hours  heparin  Injectable 5000 Unit(s) SubCutaneous every 12 hours  latanoprost 0.005% Ophthalmic Solution 1 Drop(s) Both EYES at bedtime  methylPREDNISolone sodium succinate Injectable 40 milliGRAM(s) IV Push every 8 hours  montelukast 10 milliGRAM(s) Oral daily      Vital Signs Last 24 Hrs  T(C): 36.8 (27 Dec 2019 11:52), Max: 37 (27 Dec 2019 05:43)  T(F): 98.2 (27 Dec 2019 11:52), Max: 98.6 (27 Dec 2019 05:43)  HR: 88 (27 Dec 2019 11:52) (79 - 95)  BP: 133/78 (27 Dec 2019 11:52) (133/72 - 142/70)  BP(mean): --  RR: 20 (27 Dec 2019 11:52) (17 - 20)  SpO2: 96% (27 Dec 2019 11:52) (94% - 96%)    Physical Exam:      Constitutional: frail looking  HEENT: NC/AT, EOMI, PERRLA, conjunctivae clear  Neck: supple; thyroid not palpable  Back: no tenderness  Respiratory: respiratory effort normal; crackles b/l; few wheezes  Cardiovascular: S1S2 regular, no murmurs  Abdomen: soft, not tender, not distended, positive BS  Genitourinary: no suprapubic tenderness  Lymphatic: no LN palpable  Musculoskeletal: no muscle tenderness, no joint swelling or tenderness  Extremities: no pedal edema  Neurological/ Psychiatric: AxOx3, moving all extremities  Skin: no rashes; no palpable lesions    Labs: reviewed                        13.4   6.07  )-----------( 242      ( 23 Dec 2019 13:55 )             40.3         135  |  104  |  16  ----------------------------<  85  3.9   |  25  |  0.67    Ca    8.8      23 Dec 2019 13:55    TPro  6.7  /  Alb  3.2<L>  /  TBili  0.6  /  DBili  x   /  AST  23  /  ALT  23  /  AlkPhos  74  1223     LIVER FUNCTIONS - ( 23 Dec 2019 13:55 )  Alb: 3.2 g/dL / Pro: 6.7 gm/dL / ALK PHOS: 74 U/L / ALT: 23 U/L / AST: 23 U/L / GGT: x           Urinalysis Basic - ( 23 Dec 2019 14:57 )    Color: Yellow / Appearance: Clear / S.015 / pH: x  Gluc: x / Ketone: Small  / Bili: Negative / Urobili: Negative mg/dL   Blood: x / Protein: 15 mg/dL / Nitrite: Negative   Leuk Esterase: Negative / RBC: 3-5 /HPF / WBC 0-2   Sq Epi: x / Non Sq Epi: Negative / Bacteria: Negative    Rapid Respiratory Viral Panel (19 @ 14:57)    Rapid RVP Result: Detected:     Resp Syncytial Virus (RapRVP): Detected      Culture - Urine (collected 23 Dec 2019 14:57)  Source: .Urine None  Final Report (24 Dec 2019 21:02):    <10,000 CFU/mL Normal Urogenital Karishma    Culture - Blood (collected 23 Dec 2019 13:55)  Source: .Blood Blood-Venous  Preliminary Report (25 Dec 2019 01:02):    No growth to date.    Culture - Blood (collected 23 Dec 2019 13:55)  Source: .Blood Blood-Peripheral  Preliminary Report (25 Dec 2019 01:02):    No growth to date.    Radiology: all available radiological tests reviewed    < from: Xray Chest 1 View- PORTABLE-Routine (19 @ 13:33) >  RIGHT lower lobe opacity/mass seen on prior 2019 concerning for carcinoma.    < end of copied text >    Advanced directives addressed: full resuscitation

## 2019-12-27 NOTE — PROGRESS NOTE ADULT - ASSESSMENT
-asthma exacerbation, secondary to RSV  -New masslike lesions/consolidation/ infectious/neoplastic  -Known bilateral lung nodules being followed up as an outpatient  -hypoxemia  -chronic back pain on opiates    Plan  -continue azithromycin, ceftriaxone and change to po antibiotics at discharge   -  continue with the taper of steroids as the patient continued to improve with outpatient follow-up with the prednisone taper  -Continue with Symbicort and montelukast   - evaluate the need for home O2 therapy with underlying severe chronic obstructive asthma  -continue duonebs  for symptoms of wheezing  -follow up CT in 6-8 weeks  -  in the event if the patient discharged during the weekend suggested to have a follow-up in 10 days to see the continuous improvement and discharge  with oxygen if she qualifies for home O2 therapy

## 2019-12-28 RX ORDER — ALPRAZOLAM 0.25 MG
0.25 TABLET ORAL EVERY 8 HOURS
Refills: 0 | Status: DISCONTINUED | OUTPATIENT
Start: 2019-12-28 | End: 2020-01-03

## 2019-12-28 RX ADMIN — FENTANYL CITRATE 1 PATCH: 50 INJECTION INTRAVENOUS at 19:30

## 2019-12-28 RX ADMIN — CEFTRIAXONE 2000 MILLIGRAM(S): 500 INJECTION, POWDER, FOR SOLUTION INTRAMUSCULAR; INTRAVENOUS at 10:11

## 2019-12-28 RX ADMIN — LATANOPROST 1 DROP(S): 0.05 SOLUTION/ DROPS OPHTHALMIC; TOPICAL at 23:04

## 2019-12-28 RX ADMIN — Medication 3 MILLILITER(S): at 13:24

## 2019-12-28 RX ADMIN — Medication 3 MILLILITER(S): at 08:01

## 2019-12-28 RX ADMIN — Medication 100 MILLIGRAM(S): at 18:41

## 2019-12-28 RX ADMIN — DULOXETINE HYDROCHLORIDE 30 MILLIGRAM(S): 30 CAPSULE, DELAYED RELEASE ORAL at 10:11

## 2019-12-28 RX ADMIN — HEPARIN SODIUM 5000 UNIT(S): 5000 INJECTION INTRAVENOUS; SUBCUTANEOUS at 06:34

## 2019-12-28 RX ADMIN — Medication 40 MILLIGRAM(S): at 06:34

## 2019-12-28 RX ADMIN — FENTANYL CITRATE 1 PATCH: 50 INJECTION INTRAVENOUS at 10:10

## 2019-12-28 RX ADMIN — HEPARIN SODIUM 5000 UNIT(S): 5000 INJECTION INTRAVENOUS; SUBCUTANEOUS at 18:39

## 2019-12-28 RX ADMIN — Medication 40 MILLIGRAM(S): at 18:39

## 2019-12-28 RX ADMIN — MONTELUKAST 10 MILLIGRAM(S): 4 TABLET, CHEWABLE ORAL at 10:11

## 2019-12-28 RX ADMIN — Medication 3 MILLILITER(S): at 19:34

## 2019-12-28 RX ADMIN — FENTANYL CITRATE 1 PATCH: 50 INJECTION INTRAVENOUS at 09:56

## 2019-12-28 RX ADMIN — FENTANYL CITRATE 1 PATCH: 50 INJECTION INTRAVENOUS at 10:25

## 2019-12-28 RX ADMIN — BUDESONIDE AND FORMOTEROL FUMARATE DIHYDRATE 2 PUFF(S): 160; 4.5 AEROSOL RESPIRATORY (INHALATION) at 08:01

## 2019-12-28 RX ADMIN — BUDESONIDE AND FORMOTEROL FUMARATE DIHYDRATE 2 PUFF(S): 160; 4.5 AEROSOL RESPIRATORY (INHALATION) at 19:35

## 2019-12-28 RX ADMIN — Medication 100 MILLIGRAM(S): at 10:15

## 2019-12-28 RX ADMIN — Medication 0.25 MILLIGRAM(S): at 10:10

## 2019-12-28 NOTE — PROGRESS NOTE ADULT - ASSESSMENT
-asthma exacerbation, secondary to RSV  -New masslike lesions/consolidation infectious/neoplastic  -Known bilateral lung nodules being followed up as an outpatient  -hypoxemia  -chronic back pain on opiates    Plan  -continue azithromycin, ceftriaxone  -continue with solumedrol q8  -Continue with Symbicort and montelukast   -02 supplementation  -continue duonebs  -follow up CT in 6-8 weeks  -pain management as per medicine for chronic back pain  -cough suppressant medication, add tessalon or guiafenesin -asthma exacerbation, secondary to RSV  -New masslike lesions/consolidation infectious/neoplastic  -Known bilateral lung nodules being followed up as an outpatient  -hypoxemia  -chronic back pain on opiates    Plan  -continue azithromycin, ceftriaxone  -continue with solumedrol, would decrease to BID as her breathing has improved and she is more anxious  -Continue with Symbicort and montelukast   -02 supplementation  -continue duonebs  -follow up CT in 6-8 weeks  -pain management as per medicine for chronic back pain  -cough suppressant medication, add tessalon or guiafenesin  -o2 needs eval prior to d/c

## 2019-12-28 NOTE — PROGRESS NOTE ADULT - SUBJECTIVE AND OBJECTIVE BOX
HPI: Pt is an 84 y/o F with a PMHx of IBS, COPD/asthma, hx of walking Pna in the past  who was  sent to the ED by MD Griffin c/o wheezing and SOB worsening over the past 3 days. Pt saw MD Griffin  and had an outpt CT done which showed apparent new lung nodule concerning for infection and pna.  Pt completed a 10 day course of Prednisone  for asthma.  Then she  notes that symptoms began the day after she finished the Prednisone last week. She reports  that when she coughs, she loses her breath and SOB is exacerbated by ambulating. Pt  did not use her home ProAir inhaler.  She is not on home O2.  Pt c/o  productive yellow cough yellow  and decreased PO intake. Pt denies CP, weakness, dizziness, fever, congestion, or orthopnea. No urinary complaints, no rash.    12/24: no new complaints    12/25: condition same    12/26: sob persists  repeat cxr shows right lobar mass ? Cancer    12/27: no complaints    12/28: no complaints    PHYSICAL EXAM:    Vital Signs Last 24 Hrs  T(C): 36.5 (28 Dec 2019 12:14), Max: 36.7 (27 Dec 2019 20:40)  T(F): 97.7 (28 Dec 2019 12:14), Max: 98 (27 Dec 2019 20:40)  HR: 62 (28 Dec 2019 13:36) (60 - 98)  BP: 125/81 (28 Dec 2019 12:14) (125/81 - 156/72)  BP(mean): --  RR: 19 (28 Dec 2019 12:14) (18 - 19)  SpO2: 96% (28 Dec 2019 13:36) (93% - 96%)    Constitutional: Weak appearing  HEENT: Atraumatic, LEI, Normal, No congestion  Respiratory: Breath Sounds normal, no rhonchi/wheeze  Cardiovascular: N S1S2;   Gastrointestinal: Abdomen soft, non tender, Bowel Sounds present  Extremities: No edema, peripheral pulses present  Neurological: AAO x 3, no gross focal motor deficits  Skin: Non cellulitic, no rash, ulcers  Lymph Nodes: No lymphadenopathy noted  Back: No CVA tenderness   Musculoskeletal: non tender  Breasts: Deferred  Genitourinary: deferred  Rectal: Deferred                 Lab Results:  CBC    .		Differential:	[] Automated		[] Manual  Chemistry                      MICROBIOLOGY/CULTURES:  Culture Results:   <10,000 CFU/mL Normal Urogenital Karishma (12-23 @ 14:57)  Culture Results:   No growth to date. (12-23 @ 13:55)  Culture Results:   No growth to date. (12-23 @ 13:55)                    MEDICATIONS  (STANDING):  albuterol/ipratropium for Nebulization 3 milliLiter(s) Nebulizer every 6 hours  budesonide 160 MICROgram(s)/formoterol 4.5 MICROgram(s) Inhaler 2 Puff(s) Inhalation two times a day  cefTRIAXone Injectable. 2000 milliGRAM(s) IV Push every 24 hours  DULoxetine 30 milliGRAM(s) Oral daily  heparin  Injectable 5000 Unit(s) SubCutaneous every 12 hours  latanoprost 0.005% Ophthalmic Solution 1 Drop(s) Both EYES at bedtime  methylPREDNISolone sodium succinate Injectable 40 milliGRAM(s) IV Push every 12 hours  montelukast 10 milliGRAM(s) Oral daily      MEDICATIONS  (PRN):  ALPRAZolam 0.25 milliGRAM(s) Oral every 8 hours PRN anxiety  benzonatate 100 milliGRAM(s) Oral three times a day PRN Cough  gabapentin 300 milliGRAM(s) Oral daily PRN back pain

## 2019-12-28 NOTE — PROGRESS NOTE ADULT - ASSESSMENT
84 y/o Female with h/o IBS, COPD/asthma, prior Pna was admitted on 12/23 after she was sent to the ED by MD Griffin for wheezing and SOB worsening over the past 3 days. Pt saw MD Griffin  and had an outpt CT done which showed new lung infiltration concerning for infection and pna.  Pt completed a 10 day course of Prednisone  for asthma with improvement in her respiratory condition.. She became more SOB the day after she finished the Prednisone the week PTA. She reports  that when she coughs, she loses her breath and SOB is exacerbated by ambulating. Pt  did not use her home ProAir inhaler. She is not on home O2.  Pt has  productive yellow cough and decreased PO intake. In ER she received cefepime and vancomycin IV.     1. Pneumonitis with RSV. Probable underlying bacterial superimposed pneumonia. COPD.  -dyspnea improving  -cough is dry  -f/u BC x 2, sputum c/s  -on ceftriaxone 2 gm IV qd # 5  -tolerating abx well so far; no side effects noted  -respiratory care  -repeat CXR reviewed  -droplet isolation  -continue abx coverage foe now  -monitor temps  -f/u CBC  -supportive care  2. Other issues:   -care per medicine

## 2019-12-28 NOTE — PROGRESS NOTE ADULT - SUBJECTIVE AND OBJECTIVE BOX
Date of service: 19 @ 15:59    Sitting in bed in NAD  Weak looking  No SOB at rest  Has cough    ROS: no fever or chills; poorly verbal    MEDICATIONS  (STANDING):  albuterol/ipratropium for Nebulization 3 milliLiter(s) Nebulizer every 6 hours  budesonide 160 MICROgram(s)/formoterol 4.5 MICROgram(s) Inhaler 2 Puff(s) Inhalation two times a day  cefTRIAXone Injectable. 2000 milliGRAM(s) IV Push every 24 hours  DULoxetine 30 milliGRAM(s) Oral daily  heparin  Injectable 5000 Unit(s) SubCutaneous every 12 hours  latanoprost 0.005% Ophthalmic Solution 1 Drop(s) Both EYES at bedtime  methylPREDNISolone sodium succinate Injectable 40 milliGRAM(s) IV Push every 12 hours  montelukast 10 milliGRAM(s) Oral daily      Vital Signs Last 24 Hrs  T(C): 36.5 (28 Dec 2019 12:14), Max: 36.7 (27 Dec 2019 20:40)  T(F): 97.7 (28 Dec 2019 12:14), Max: 98 (27 Dec 2019 20:40)  HR: 62 (28 Dec 2019 13:36) (60 - 98)  BP: 125/81 (28 Dec 2019 12:14) (125/81 - 156/72)  BP(mean): --  RR: 19 (28 Dec 2019 12:14) (18 - 19)  SpO2: 96% (28 Dec 2019 13:36) (93% - 96%)    Physical Exam:      Constitutional: frail looking  HEENT: NC/AT, EOMI, PERRLA, conjunctivae clear  Neck: supple; thyroid not palpable  Back: no tenderness  Respiratory: respiratory effort normal; crackles b/l; few wheezes  Cardiovascular: S1S2 regular, no murmurs  Abdomen: soft, not tender, not distended, positive BS  Genitourinary: no suprapubic tenderness  Lymphatic: no LN palpable  Musculoskeletal: no muscle tenderness, no joint swelling or tenderness  Extremities: no pedal edema  Neurological/ Psychiatric: AxOx3, moving all extremities  Skin: no rashes; no palpable lesions    Labs: reviewed                        13.4   6.07  )-----------( 242      ( 23 Dec 2019 13:55 )             40.3     12-23    135  |  104  |  16  ----------------------------<  85  3.9   |  25  |  0.67    Ca    8.8      23 Dec 2019 13:55    TPro  6.7  /  Alb  3.2<L>  /  TBili  0.6  /  DBili  x   /  AST  23  /  ALT  23  /  AlkPhos  74  12-23     LIVER FUNCTIONS - ( 23 Dec 2019 13:55 )  Alb: 3.2 g/dL / Pro: 6.7 gm/dL / ALK PHOS: 74 U/L / ALT: 23 U/L / AST: 23 U/L / GGT: x           Urinalysis Basic - ( 23 Dec 2019 14:57 )    Color: Yellow / Appearance: Clear / S.015 / pH: x  Gluc: x / Ketone: Small  / Bili: Negative / Urobili: Negative mg/dL   Blood: x / Protein: 15 mg/dL / Nitrite: Negative   Leuk Esterase: Negative / RBC: 3-5 /HPF / WBC 0-2   Sq Epi: x / Non Sq Epi: Negative / Bacteria: Negative    Rapid Respiratory Viral Panel (19 @ 14:57)    Rapid RVP Result: Detected:     Resp Syncytial Virus (RapRVP): Detected      Culture - Urine (collected 23 Dec 2019 14:57)  Source: .Urine None  Final Report (24 Dec 2019 21:02):    <10,000 CFU/mL Normal Urogenital Karishma    Culture - Blood (collected 23 Dec 2019 13:55)  Source: .Blood Blood-Venous  Preliminary Report (25 Dec 2019 01:02):    No growth to date.    Culture - Blood (collected 23 Dec 2019 13:55)  Source: .Blood Blood-Peripheral  Preliminary Report (25 Dec 2019 01:02):    No growth to date.    Radiology: all available radiological tests reviewed    < from: Xray Chest 1 View- PORTABLE-Routine (19 @ 13:33) >  RIGHT lower lobe opacity/mass seen on prior 2019 concerning for carcinoma.    < end of copied text >    Advanced directives addressed: full resuscitation

## 2019-12-28 NOTE — PROGRESS NOTE ADULT - ASSESSMENT
Pt is admitted w/    Resp Distress, hypoxia 87%  Comm Aqc Pna, failing out pt tx,     RSV pneumonitis with superimposed Pneumonia  Fever  Astma Exacerbation  New masslike lesions/consolidation infectious/neoplastic  r/o lung CA  Anxiety    PLAN:   - admit  - iv steroids, nebs  cont rocephin as per ID  - CT chest in 6-8 weeks to document resolution  - ID and pulm consult appreciated  - DVT prophylaxis: heparin s/q  add xanax prn

## 2019-12-28 NOTE — PROGRESS NOTE ADULT - SUBJECTIVE AND OBJECTIVE BOX
Subjective:    Review of Systems:  All 10 systems reviewed in detailed and found to be negative with the exception of what has already been described above    Allergies:  &quot;almost &quot; from MRI dye (Other)  No Known Drug Allergies  oxycodone (Nausea)    Meds  MEDICATIONS  (STANDING):  albuterol/ipratropium for Nebulization 3 milliLiter(s) Nebulizer every 6 hours  budesonide 160 MICROgram(s)/formoterol 4.5 MICROgram(s) Inhaler 2 Puff(s) Inhalation two times a day  cefTRIAXone Injectable. 2000 milliGRAM(s) IV Push every 24 hours  DULoxetine 30 milliGRAM(s) Oral daily  heparin  Injectable 5000 Unit(s) SubCutaneous every 12 hours  latanoprost 0.005% Ophthalmic Solution 1 Drop(s) Both EYES at bedtime  methylPREDNISolone sodium succinate Injectable 40 milliGRAM(s) IV Push every 8 hours  montelukast 10 milliGRAM(s) Oral daily    MEDICATIONS  (PRN):  ALPRAZolam 0.25 milliGRAM(s) Oral every 8 hours PRN anxiety  benzonatate 100 milliGRAM(s) Oral three times a day PRN Cough  gabapentin 300 milliGRAM(s) Oral daily PRN back pain    Physical Exam  T(C): 36.4 (19 @ 05:16), Max: 36.8 (19 @ 11:52)  HR: 60 (19 @ 08:00) (60 - 98)  BP: 156/72 (19 @ 05:16) (132/69 - 156/72)  RR: 18 (19 @ 05:16) (18 - 20)  SpO2: 96% (19 @ 08:00) (93% - 96%)  Gen: Alert, oriented, no distress  HEENT: Anicteric sclera, moist mucous membranes, no JVD, no lymphadenopathy, good dentition  Cardio: Regular rhythm and rate, normal S1S2, no murmurs  Resp: Wheezing bilaterally  GI: Nontender, nondistended, normoactive bowel sounds  Ext: No cyanosis, clubbing or edema  Neuro: Nonfocal    Labs:        143  |  107  |  32<H>  ----------------------------<  143<H>  4.2   |  31  |  0.79    Ca    9.2      26 Dec 2019 13:05     patient out patient imaging noted including the report shows right middle lobe mass /consolidation right upper lobe mass /consolidation with bilateral lung nodules Subjective:  Complains about her neighbor making too much noise  Tells me that she had not slept for 4 nights  Slightly SOB  Complains of weakness  Had an episode of aspiration of eggs  Would like to change to a private room or will leave AMA    Review of Systems:  All 10 systems reviewed in detailed and found to be negative with the exception of what has already been described above    Allergies:  &quot;almost &quot; from MRI dye (Other)  No Known Drug Allergies  oxycodone (Nausea)    Meds  MEDICATIONS  (STANDING):  albuterol/ipratropium for Nebulization 3 milliLiter(s) Nebulizer every 6 hours  budesonide 160 MICROgram(s)/formoterol 4.5 MICROgram(s) Inhaler 2 Puff(s) Inhalation two times a day  cefTRIAXone Injectable. 2000 milliGRAM(s) IV Push every 24 hours  DULoxetine 30 milliGRAM(s) Oral daily  heparin  Injectable 5000 Unit(s) SubCutaneous every 12 hours  latanoprost 0.005% Ophthalmic Solution 1 Drop(s) Both EYES at bedtime  methylPREDNISolone sodium succinate Injectable 40 milliGRAM(s) IV Push every 8 hours  montelukast 10 milliGRAM(s) Oral daily    MEDICATIONS  (PRN):  ALPRAZolam 0.25 milliGRAM(s) Oral every 8 hours PRN anxiety  benzonatate 100 milliGRAM(s) Oral three times a day PRN Cough  gabapentin 300 milliGRAM(s) Oral daily PRN back pain    Physical Exam  T(C): 36.4 (19 @ 05:16), Max: 36.8 (19 @ 11:52)  HR: 60 (19 @ 08:00) (60 - 98)  BP: 156/72 (19 @ 05:16) (132/69 - 156/72)  RR: 18 (19 @ 05:16) (18 - 20)  SpO2: 96% (19 @ 08:00) (93% - 96%)  Gen: Alert, anxious  HEENT: Anicteric sclera, moist mucous membranes, no JVD, no lymphadenopathy  Cardio: Regular rhythm and rate, normal S1S2, no murmurs  Resp: Wheezing bilaterally, improved  GI: Nontender, nondistended, normoactive bowel sounds  Ext: No cyanosis, clubbing or edema  Neuro: Nonfocal    Labs:        143  |  107  |  32<H>  ----------------------------<  143<H>  4.2   |  31  |  0.79    Ca    9.2      26 Dec 2019 13:05     patient out patient imaging noted including the report shows right middle lobe mass /consolidation right upper lobe mass /consolidation with bilateral lung nodules

## 2019-12-29 RX ORDER — POLYETHYLENE GLYCOL 3350 17 G/17G
17 POWDER, FOR SOLUTION ORAL DAILY
Refills: 0 | Status: DISCONTINUED | OUTPATIENT
Start: 2019-12-29 | End: 2020-01-03

## 2019-12-29 RX ADMIN — Medication 600 MILLIGRAM(S): at 18:44

## 2019-12-29 RX ADMIN — POLYETHYLENE GLYCOL 3350 17 GRAM(S): 17 POWDER, FOR SOLUTION ORAL at 18:44

## 2019-12-29 RX ADMIN — Medication 40 MILLIGRAM(S): at 05:46

## 2019-12-29 RX ADMIN — FENTANYL CITRATE 1 PATCH: 50 INJECTION INTRAVENOUS at 19:57

## 2019-12-29 RX ADMIN — LATANOPROST 1 DROP(S): 0.05 SOLUTION/ DROPS OPHTHALMIC; TOPICAL at 21:04

## 2019-12-29 RX ADMIN — Medication 40 MILLIGRAM(S): at 18:43

## 2019-12-29 RX ADMIN — BUDESONIDE AND FORMOTEROL FUMARATE DIHYDRATE 2 PUFF(S): 160; 4.5 AEROSOL RESPIRATORY (INHALATION) at 19:57

## 2019-12-29 RX ADMIN — Medication 3 MILLILITER(S): at 14:13

## 2019-12-29 RX ADMIN — FENTANYL CITRATE 1 PATCH: 50 INJECTION INTRAVENOUS at 10:00

## 2019-12-29 RX ADMIN — GABAPENTIN 300 MILLIGRAM(S): 400 CAPSULE ORAL at 10:47

## 2019-12-29 RX ADMIN — Medication 100 MILLIGRAM(S): at 05:46

## 2019-12-29 RX ADMIN — BUDESONIDE AND FORMOTEROL FUMARATE DIHYDRATE 2 PUFF(S): 160; 4.5 AEROSOL RESPIRATORY (INHALATION) at 10:11

## 2019-12-29 RX ADMIN — Medication 3 MILLILITER(S): at 19:57

## 2019-12-29 RX ADMIN — CEFTRIAXONE 2000 MILLIGRAM(S): 500 INJECTION, POWDER, FOR SOLUTION INTRAMUSCULAR; INTRAVENOUS at 10:46

## 2019-12-29 RX ADMIN — Medication 3 MILLILITER(S): at 10:12

## 2019-12-29 RX ADMIN — HEPARIN SODIUM 5000 UNIT(S): 5000 INJECTION INTRAVENOUS; SUBCUTANEOUS at 05:46

## 2019-12-29 RX ADMIN — MONTELUKAST 10 MILLIGRAM(S): 4 TABLET, CHEWABLE ORAL at 10:47

## 2019-12-29 RX ADMIN — DULOXETINE HYDROCHLORIDE 30 MILLIGRAM(S): 30 CAPSULE, DELAYED RELEASE ORAL at 10:47

## 2019-12-29 RX ADMIN — HEPARIN SODIUM 5000 UNIT(S): 5000 INJECTION INTRAVENOUS; SUBCUTANEOUS at 18:43

## 2019-12-29 NOTE — PROGRESS NOTE ADULT - SUBJECTIVE AND OBJECTIVE BOX
Subjective:    Review of Systems:  All 10 systems reviewed in detailed and found to be negative with the exception of what has already been described above    Allergies:  &quot;almost &quot; from MRI dye (Other)  No Known Drug Allergies  oxycodone (Nausea)    Meds  MEDICATIONS  (STANDING):  albuterol/ipratropium for Nebulization 3 milliLiter(s) Nebulizer every 6 hours  budesonide 160 MICROgram(s)/formoterol 4.5 MICROgram(s) Inhaler 2 Puff(s) Inhalation two times a day  cefTRIAXone Injectable. 2000 milliGRAM(s) IV Push every 24 hours  DULoxetine 30 milliGRAM(s) Oral daily  heparin  Injectable 5000 Unit(s) SubCutaneous every 12 hours  latanoprost 0.005% Ophthalmic Solution 1 Drop(s) Both EYES at bedtime  methylPREDNISolone sodium succinate Injectable 40 milliGRAM(s) IV Push every 12 hours  montelukast 10 milliGRAM(s) Oral daily    MEDICATIONS  (PRN):  ALPRAZolam 0.25 milliGRAM(s) Oral every 8 hours PRN anxiety  benzonatate 100 milliGRAM(s) Oral three times a day PRN Cough  gabapentin 300 milliGRAM(s) Oral daily PRN back pain    Physical Exam  T(C): 36.8 (19 @ 05:52), Max: 36.8 (19 @ 05:52)  HR: 80 (19 @ 05:52) (60 - 96)  BP: 142/80 (19 @ 05:52) (125/81 - 142/80)  RR: 20 (19 @ 05:52) (19 - 20)  SpO2: 94% (19 @ 05:52) (94% - 96%)  Gen: Alert, anxious  HEENT: Anicteric sclera, moist mucous membranes, no JVD, no lymphadenopathy  Cardio: Regular rhythm and rate, normal S1S2, no murmurs  Resp: Wheezing bilaterally, improved  GI: Nontender, nondistended, normoactive bowel sounds  Ext: No cyanosis, clubbing or edema  Neuro: Nonfocal    Labs:     patient out patient imaging noted including the report shows right middle lobe mass /consolidation right upper lobe mass /consolidation with bilateral lung nodules Subjective:  Slept better last night  Still has some SOB    Review of Systems:  All 10 systems reviewed in detailed and found to be negative with the exception of what has already been described above    Allergies:  &quot;almost &quot; from MRI dye (Other)  No Known Drug Allergies  oxycodone (Nausea)    Meds  MEDICATIONS  (STANDING):  albuterol/ipratropium for Nebulization 3 milliLiter(s) Nebulizer every 6 hours  budesonide 160 MICROgram(s)/formoterol 4.5 MICROgram(s) Inhaler 2 Puff(s) Inhalation two times a day  cefTRIAXone Injectable. 2000 milliGRAM(s) IV Push every 24 hours  DULoxetine 30 milliGRAM(s) Oral daily  heparin  Injectable 5000 Unit(s) SubCutaneous every 12 hours  latanoprost 0.005% Ophthalmic Solution 1 Drop(s) Both EYES at bedtime  methylPREDNISolone sodium succinate Injectable 40 milliGRAM(s) IV Push every 12 hours  montelukast 10 milliGRAM(s) Oral daily    MEDICATIONS  (PRN):  ALPRAZolam 0.25 milliGRAM(s) Oral every 8 hours PRN anxiety  benzonatate 100 milliGRAM(s) Oral three times a day PRN Cough  gabapentin 300 milliGRAM(s) Oral daily PRN back pain    Physical Exam  T(C): 36.8 (19 @ 05:52), Max: 36.8 (19 @ 05:52)  HR: 80 (19 @ 05:52) (60 - 96)  BP: 142/80 (19 @ 05:52) (125/81 - 142/80)  RR: 20 (19 @ 05:52) (19 - 20)  SpO2: 94% (19 @ 05:52) (94% - 96%)  Gen: Alert, anxious  HEENT: Anicteric sclera, moist mucous membranes, no JVD, no lymphadenopathy  Cardio: Regular rhythm and rate, normal S1S2, no murmurs  Resp: Wheezing bilaterally, improved  GI: Nontender, nondistended, normoactive bowel sounds  Ext: No cyanosis, clubbing or edema  Neuro: Nonfocal    Labs:     patient out patient imaging noted including the report shows right middle lobe mass /consolidation right upper lobe mass /consolidation with bilateral lung nodules

## 2019-12-29 NOTE — PROGRESS NOTE ADULT - ASSESSMENT
Pt is admitted w/    Resp Distress, hypoxia 87%  Comm Aqc Pna, failing out pt tx,     RSV pneumonitis with superimposed Pneumonia  Fever  Astma Exacerbation  New masslike lesions/consolidation infectious/neoplastic  r/o lung CA  Anxiety  Constipation    PLAN:   - admit  - iv steroids, nebs  cont rocephin as per ID  - CT chest in 6-8 weeks to document resolution  - ID and pulm consult appreciated  - DVT prophylaxis: heparin s/q  add xanax prn  mucinex po bid  miralax prn daily; document BM

## 2019-12-29 NOTE — PROGRESS NOTE ADULT - SUBJECTIVE AND OBJECTIVE BOX
Date of service: 19 @ 15:53    Lying in bed in NAD  Weak looking  SOB improving  Has dry cough    ROS: no fever or chills; denies dizziness, no HA, no abdominal pain, no diarrhea or constipation; no dysuria, no legs pain, no rashes    MEDICATIONS  (STANDING):  albuterol/ipratropium for Nebulization 3 milliLiter(s) Nebulizer every 6 hours  budesonide 160 MICROgram(s)/formoterol 4.5 MICROgram(s) Inhaler 2 Puff(s) Inhalation two times a day  cefTRIAXone Injectable. 2000 milliGRAM(s) IV Push every 24 hours  DULoxetine 30 milliGRAM(s) Oral daily  guaiFENesin  milliGRAM(s) Oral every 12 hours  heparin  Injectable 5000 Unit(s) SubCutaneous every 12 hours  latanoprost 0.005% Ophthalmic Solution 1 Drop(s) Both EYES at bedtime  methylPREDNISolone sodium succinate Injectable 40 milliGRAM(s) IV Push every 12 hours  montelukast 10 milliGRAM(s) Oral daily      Vital Signs Last 24 Hrs  T(C): 36.8 (29 Dec 2019 11:29), Max: 36.8 (29 Dec 2019 05:52)  T(F): 98.2 (29 Dec 2019 11:29), Max: 98.2 (29 Dec 2019 05:52)  HR: 109 (29 Dec 2019 11:29) (60 - 109)  BP: 149/95 (29 Dec 2019 11:29) (130/81 - 149/95)  BP(mean): --  RR: 20 (29 Dec 2019 11:29) (19 - 20)  SpO2: 95% (29 Dec 2019 11:29) (94% - 96%)    Physical Exam:      Constitutional: frail looking  HEENT: NC/AT, EOMI, PERRLA, conjunctivae clear  Neck: supple; thyroid not palpable  Back: no tenderness  Respiratory: respiratory effort normal; few basal crackles  Cardiovascular: S1S2 regular, no murmurs  Abdomen: soft, not tender, not distended, positive BS  Genitourinary: no suprapubic tenderness  Lymphatic: no LN palpable  Musculoskeletal: no muscle tenderness, no joint swelling or tenderness  Extremities: no pedal edema  Neurological/ Psychiatric: AxOx3, moving all extremities  Skin: no rashes; no palpable lesions    Labs: reviewed                        13.4   6.07  )-----------( 242      ( 23 Dec 2019 13:55 )             40.3         135  |  104  |  16  ----------------------------<  85  3.9   |  25  |  0.67    Ca    8.8      23 Dec 2019 13:55    TPro  6.7  /  Alb  3.2<L>  /  TBili  0.6  /  DBili  x   /  AST  23  /  ALT  23  /  AlkPhos  74  1223     LIVER FUNCTIONS - ( 23 Dec 2019 13:55 )  Alb: 3.2 g/dL / Pro: 6.7 gm/dL / ALK PHOS: 74 U/L / ALT: 23 U/L / AST: 23 U/L / GGT: x           Urinalysis Basic - ( 23 Dec 2019 14:57 )    Color: Yellow / Appearance: Clear / S.015 / pH: x  Gluc: x / Ketone: Small  / Bili: Negative / Urobili: Negative mg/dL   Blood: x / Protein: 15 mg/dL / Nitrite: Negative   Leuk Esterase: Negative / RBC: 3-5 /HPF / WBC 0-2   Sq Epi: x / Non Sq Epi: Negative / Bacteria: Negative    Rapid Respiratory Viral Panel (19 @ 14:57)    Rapid RVP Result: Detected:     Resp Syncytial Virus (RapRVP): Detected      Culture - Urine (collected 23 Dec 2019 14:57)  Source: .Urine None  Final Report (24 Dec 2019 21:02):    <10,000 CFU/mL Normal Urogenital Karishma    Culture - Blood (collected 23 Dec 2019 13:55)  Source: .Blood Blood-Venous  Preliminary Report (25 Dec 2019 01:02):    No growth to date.    Culture - Blood (collected 23 Dec 2019 13:55)  Source: .Blood Blood-Peripheral  Preliminary Report (25 Dec 2019 01:02):    No growth to date.    Radiology: all available radiological tests reviewed    < from: Xray Chest 1 View- PORTABLE-Routine (19 @ 13:33) >  RIGHT lower lobe opacity/mass seen on prior 2019 concerning for carcinoma.    < end of copied text >    Advanced directives addressed: full resuscitation

## 2019-12-29 NOTE — PROGRESS NOTE ADULT - SUBJECTIVE AND OBJECTIVE BOX
HPI: Pt is an 84 y/o F with a PMHx of IBS, COPD/asthma, hx of walking Pna in the past  who was  sent to the ED by MD Griffin c/o wheezing and SOB worsening over the past 3 days. Pt saw MD Griffin  and had an outpt CT done which showed apparent new lung nodule concerning for infection and pna.  Pt completed a 10 day course of Prednisone  for asthma.  Then she  notes that symptoms began the day after she finished the Prednisone last week. She reports  that when she coughs, she loses her breath and SOB is exacerbated by ambulating. Pt  did not use her home ProAir inhaler.  She is not on home O2.  Pt c/o  productive yellow cough yellow  and decreased PO intake. Pt denies CP, weakness, dizziness, fever, congestion, or orthopnea. No urinary complaints, no rash.    12/24: no new complaints    12/25: condition same    12/26: sob persists  repeat cxr shows right lobar mass ? Cancer    12/27: no complaints    12/28: no complaints    12/30: c/o constipation    PHYSICAL EXAM:    Vital Signs Last 24 Hrs  T(C): 36.8 (29 Dec 2019 11:29), Max: 36.8 (29 Dec 2019 05:52)  T(F): 98.2 (29 Dec 2019 11:29), Max: 98.2 (29 Dec 2019 05:52)  HR: 109 (29 Dec 2019 11:29) (60 - 109)  BP: 149/95 (29 Dec 2019 11:29) (130/81 - 149/95)  BP(mean): --  RR: 20 (29 Dec 2019 11:29) (19 - 20)  SpO2: 95% (29 Dec 2019 11:29) (94% - 96%)    Constitutional: Weak appearing  HEENT: Atraumatic, LEI, Normal, No congestion  Respiratory: Breath Sounds normal, no rhonchi/wheeze  Cardiovascular: N S1S2;   Gastrointestinal: Abdomen soft, non tender, Bowel Sounds present  Extremities: No edema, peripheral pulses present  Neurological: AAO x 3, no gross focal motor deficits  Skin: Non cellulitic, no rash, ulcers  Lymph Nodes: No lymphadenopathy noted  Back: No CVA tenderness   Musculoskeletal: non tender  Breasts: Deferred  Genitourinary: deferred  Rectal: Deferred                             MICROBIOLOGY/CULTURES:  Culture Results:   <10,000 CFU/mL Normal Urogenital Karishma (12-23 @ 14:57)  Culture Results:   No growth to date. (12-23 @ 13:55)  Culture Results:   No growth to date. (12-23 @ 13:55)              MEDICATIONS  (STANDING):  albuterol/ipratropium for Nebulization 3 milliLiter(s) Nebulizer every 6 hours  budesonide 160 MICROgram(s)/formoterol 4.5 MICROgram(s) Inhaler 2 Puff(s) Inhalation two times a day  cefTRIAXone Injectable. 2000 milliGRAM(s) IV Push every 24 hours  DULoxetine 30 milliGRAM(s) Oral daily  guaiFENesin  milliGRAM(s) Oral every 12 hours  heparin  Injectable 5000 Unit(s) SubCutaneous every 12 hours  latanoprost 0.005% Ophthalmic Solution 1 Drop(s) Both EYES at bedtime  methylPREDNISolone sodium succinate Injectable 40 milliGRAM(s) IV Push every 12 hours  montelukast 10 milliGRAM(s) Oral daily    MEDICATIONS  (PRN):  ALPRAZolam 0.25 milliGRAM(s) Oral every 8 hours PRN anxiety  benzonatate 100 milliGRAM(s) Oral three times a day PRN Cough  gabapentin 300 milliGRAM(s) Oral daily PRN back pain  polyethylene glycol 3350 17 Gram(s) Oral daily PRN Constipation

## 2019-12-29 NOTE — PROGRESS NOTE ADULT - ASSESSMENT
-asthma exacerbation, secondary to RSV  -New masslike lesions/consolidation infectious/neoplastic  -Known bilateral lung nodules being followed up as an outpatient  -hypoxemia  -chronic back pain on opiates    Plan  -continue azithromycin, ceftriaxone  -continue with solumedrol, would decrease to BID as her breathing has improved and she is more anxious  -Continue with Symbicort and montelukast   -02 supplementation  -continue duonebs  -follow up CT in 6-8 weeks  -pain management as per medicine for chronic back pain  -cough suppressant medication, add tessalon or guiafenesin  -o2 needs eval prior to d/c -asthma exacerbation, secondary to RSV  -New masslike lesions/consolidation infectious/neoplastic  -Known bilateral lung nodules being followed up as an outpatient  -hypoxemia  -chronic back pain on opiates    Plan  -continue ceftriaxone  - s/p azithro  -continue with solumedrol, titrate down to once daily tomorrow  -Continue with Symbicort and montelukast   -02 supplementation  -continue duonebs  -follow up CT in 6-8 weeks  -pain management as per medicine for chronic back pain  -cough suppressant medication, add tessalon or guiafenesin  -o2 needs eval prior to d/c

## 2019-12-30 LAB
ANION GAP SERPL CALC-SCNC: 3 MMOL/L — LOW (ref 5–17)
BUN SERPL-MCNC: 28 MG/DL — HIGH (ref 7–23)
CALCIUM SERPL-MCNC: 9.1 MG/DL — SIGNIFICANT CHANGE UP (ref 8.5–10.1)
CHLORIDE SERPL-SCNC: 105 MMOL/L — SIGNIFICANT CHANGE UP (ref 96–108)
CO2 SERPL-SCNC: 31 MMOL/L — SIGNIFICANT CHANGE UP (ref 22–31)
CREAT SERPL-MCNC: 0.56 MG/DL — SIGNIFICANT CHANGE UP (ref 0.5–1.3)
GLUCOSE SERPL-MCNC: 95 MG/DL — SIGNIFICANT CHANGE UP (ref 70–99)
HCT VFR BLD CALC: 41 % — SIGNIFICANT CHANGE UP (ref 34.5–45)
HGB BLD-MCNC: 13.2 G/DL — SIGNIFICANT CHANGE UP (ref 11.5–15.5)
MCHC RBC-ENTMCNC: 29.6 PG — SIGNIFICANT CHANGE UP (ref 27–34)
MCHC RBC-ENTMCNC: 32.2 GM/DL — SIGNIFICANT CHANGE UP (ref 32–36)
MCV RBC AUTO: 91.9 FL — SIGNIFICANT CHANGE UP (ref 80–100)
PLATELET # BLD AUTO: 269 K/UL — SIGNIFICANT CHANGE UP (ref 150–400)
POTASSIUM SERPL-MCNC: 4.7 MMOL/L — SIGNIFICANT CHANGE UP (ref 3.5–5.3)
POTASSIUM SERPL-SCNC: 4.7 MMOL/L — SIGNIFICANT CHANGE UP (ref 3.5–5.3)
RBC # BLD: 4.46 M/UL — SIGNIFICANT CHANGE UP (ref 3.8–5.2)
RBC # FLD: 13.2 % — SIGNIFICANT CHANGE UP (ref 10.3–14.5)
SODIUM SERPL-SCNC: 139 MMOL/L — SIGNIFICANT CHANGE UP (ref 135–145)
WBC # BLD: 6.72 K/UL — SIGNIFICANT CHANGE UP (ref 3.8–10.5)
WBC # FLD AUTO: 6.72 K/UL — SIGNIFICANT CHANGE UP (ref 3.8–10.5)

## 2019-12-30 RX ORDER — MAGNESIUM HYDROXIDE 400 MG/1
30 TABLET, CHEWABLE ORAL DAILY
Refills: 0 | Status: DISCONTINUED | OUTPATIENT
Start: 2019-12-30 | End: 2020-01-03

## 2019-12-30 RX ORDER — ACETYLCYSTEINE 200 MG/ML
3 VIAL (ML) MISCELLANEOUS
Refills: 0 | Status: DISCONTINUED | OUTPATIENT
Start: 2019-12-30 | End: 2020-01-03

## 2019-12-30 RX ADMIN — Medication 3 MILLILITER(S): at 17:04

## 2019-12-30 RX ADMIN — Medication 600 MILLIGRAM(S): at 17:40

## 2019-12-30 RX ADMIN — MAGNESIUM HYDROXIDE 30 MILLILITER(S): 400 TABLET, CHEWABLE ORAL at 21:30

## 2019-12-30 RX ADMIN — Medication 3 MILLILITER(S): at 16:58

## 2019-12-30 RX ADMIN — DULOXETINE HYDROCHLORIDE 30 MILLIGRAM(S): 30 CAPSULE, DELAYED RELEASE ORAL at 11:39

## 2019-12-30 RX ADMIN — HEPARIN SODIUM 5000 UNIT(S): 5000 INJECTION INTRAVENOUS; SUBCUTANEOUS at 17:42

## 2019-12-30 RX ADMIN — Medication 100 MILLIGRAM(S): at 17:41

## 2019-12-30 RX ADMIN — Medication 40 MILLIGRAM(S): at 05:40

## 2019-12-30 RX ADMIN — Medication 3 MILLILITER(S): at 10:46

## 2019-12-30 RX ADMIN — HEPARIN SODIUM 5000 UNIT(S): 5000 INJECTION INTRAVENOUS; SUBCUTANEOUS at 05:40

## 2019-12-30 RX ADMIN — FENTANYL CITRATE 1 PATCH: 50 INJECTION INTRAVENOUS at 13:55

## 2019-12-30 RX ADMIN — Medication 40 MILLIGRAM(S): at 17:42

## 2019-12-30 RX ADMIN — LATANOPROST 1 DROP(S): 0.05 SOLUTION/ DROPS OPHTHALMIC; TOPICAL at 21:30

## 2019-12-30 RX ADMIN — BUDESONIDE AND FORMOTEROL FUMARATE DIHYDRATE 2 PUFF(S): 160; 4.5 AEROSOL RESPIRATORY (INHALATION) at 21:33

## 2019-12-30 RX ADMIN — Medication 3 MILLILITER(S): at 21:33

## 2019-12-30 RX ADMIN — MONTELUKAST 10 MILLIGRAM(S): 4 TABLET, CHEWABLE ORAL at 11:39

## 2019-12-30 RX ADMIN — Medication 600 MILLIGRAM(S): at 05:40

## 2019-12-30 RX ADMIN — CEFTRIAXONE 2000 MILLIGRAM(S): 500 INJECTION, POWDER, FOR SOLUTION INTRAMUSCULAR; INTRAVENOUS at 11:38

## 2019-12-30 RX ADMIN — GABAPENTIN 300 MILLIGRAM(S): 400 CAPSULE ORAL at 18:43

## 2019-12-30 RX ADMIN — BUDESONIDE AND FORMOTEROL FUMARATE DIHYDRATE 2 PUFF(S): 160; 4.5 AEROSOL RESPIRATORY (INHALATION) at 10:46

## 2019-12-30 NOTE — PROGRESS NOTE ADULT - ASSESSMENT
Pt is admitted w/    Resp Distress, hypoxia 87%  Comm Aqc Pna, failing out pt tx,     RSV pneumonitis with superimposed Pneumonia  Fever  Astma Exacerbation  New masslike lesions/consolidation infectious/neoplastic  r/o lung CA  Anxiety  Constipation    PLAN:   - admit  - iv steroids, nebs  cont rocephin as per ID  - CT chest in 6-8 weeks to document resolution  - ID and pulm consult appreciated  - DVT prophylaxis: heparin s/q  add xanax prn  mucinex po bid  miralax prn daily; document BM, add MOM    poc discussed with pt, team, Dr. Griffin

## 2019-12-30 NOTE — PROGRESS NOTE ADULT - SUBJECTIVE AND OBJECTIVE BOX
SUBJECTIVE       PAST MEDICAL & SURGICAL HISTORY:  COPD (chronic obstructive pulmonary disease)  Walking pneumonia  IBS (irritable bowel syndrome)  Chronic back pain  Asthma  Fracture of left wrist, closed, initial encounter: s/p repair x 2  Closed fracture of right femur, unspecified fracture morphology, unspecified portion of femur, initial encounter   delivery delivered: x 2  Bakers cyst  S/P cataract surgery: left  History of back surgery    OBJECTIVE   Vital Signs Last 24 Hrs  T(C): 36.7 (30 Dec 2019 05:39), Max: 36.8 (29 Dec 2019 11:29)  T(F): 98 (30 Dec 2019 05:39), Max: 98.2 (29 Dec 2019 11:29)  HR: 87 (30 Dec 2019 05:39) (80 - 109)  BP: 169/93 (30 Dec 2019 05:39) (137/77 - 169/93)  BP(mean): --  RR: 20 (30 Dec 2019 05:39) (18 - 20)  SpO2: 97% (30 Dec 2019 05:39) (94% - 97%)    Review of systems   as dictated in the history of present illness with the review of other systems non contributory     PHYSICAL EXAM:  Constitutional: , awake and alert, not in distress.  HEENT: Normo cephalic atraumatic  Neck: Soft and supple, No J.V.D   Respiratory: vesicular breathing , No wheezing, rales or rhonchi.   Cardiovascular: S1 and S2, regular rate .   Gastrointestinal:  soft, nontender,   Extremities: No  edema or calf tenderness .  Neurological: No new  focal deficits.    MEDICATIONS  (STANDING):  albuterol/ipratropium for Nebulization 3 milliLiter(s) Nebulizer every 6 hours  budesonide 160 MICROgram(s)/formoterol 4.5 MICROgram(s) Inhaler 2 Puff(s) Inhalation two times a day  cefTRIAXone Injectable. 2000 milliGRAM(s) IV Push every 24 hours  DULoxetine 30 milliGRAM(s) Oral daily  guaiFENesin  milliGRAM(s) Oral every 12 hours  heparin  Injectable 5000 Unit(s) SubCutaneous every 12 hours  latanoprost 0.005% Ophthalmic Solution 1 Drop(s) Both EYES at bedtime  methylPREDNISolone sodium succinate Injectable 40 milliGRAM(s) IV Push every 12 hours  montelukast 10 milliGRAM(s) Oral daily                            13.2   6.72  )-----------( 269      ( 30 Dec 2019 08:06 )             41.0     12-30    139  |  105  |  28<H>  ----------------------------<  95  4.7   |  31  |  0.56    Ca    9.1      30 Dec 2019 08:06 SUBJECTIVE      patient seen in the morning continued to complain of cough and wheezing and feels difficulty in getting the sputum out.   no fever complaining of constipation   feels she need to walk with walker with oxygen    PAST MEDICAL & SURGICAL HISTORY:  COPD (chronic obstructive pulmonary disease)  Walking pneumonia  IBS (irritable bowel syndrome)  Chronic back pain  Asthma  Fracture of left wrist, closed, initial encounter: s/p repair x 2  Closed fracture of right femur, unspecified fracture morphology, unspecified portion of femur, initial encounter   delivery delivered: x 2  Bakers cyst  S/P cataract surgery: left  History of back surgery    OBJECTIVE   Vital Signs Last 24 Hrs  T(C): 36.7 (30 Dec 2019 05:39), Max: 36.8 (29 Dec 2019 11:29)  T(F): 98 (30 Dec 2019 05:39), Max: 98.2 (29 Dec 2019 11:29)  HR: 87 (30 Dec 2019 05:39) (80 - 109)  BP: 169/93 (30 Dec 2019 05:39) (137/77 - 169/93)  BP(mean): --  RR: 20 (30 Dec 2019 05:39) (18 - 20)  SpO2: 97% (30 Dec 2019 05:39) (94% - 97%)    Review of systems   as dictated in the history of present illness with the review of other systems non contributory     PHYSICAL EXAM:  Constitutional: , awake and alert, not in distress  and breathing comfortably on the nasal cannula  HEENT: Normo cephalic atraumatic  Neck: Soft and supple, No J.V.D   Respiratory: vesicular breathing , unable to take deeper breaths with the cough   Cardiovascular: S1 and S2, regular rate .   Gastrointestinal:  soft, nontender,   Extremities: No  edema or calf tenderness .  Neurological: No new  focal deficits.    MEDICATIONS  (STANDING):  albuterol/ipratropium for Nebulization 3 milliLiter(s) Nebulizer every 6 hours  budesonide 160 MICROgram(s)/formoterol 4.5 MICROgram(s) Inhaler 2 Puff(s) Inhalation two times a day  cefTRIAXone Injectable. 2000 milliGRAM(s) IV Push every 24 hours  DULoxetine 30 milliGRAM(s) Oral daily  guaiFENesin  milliGRAM(s) Oral every 12 hours  heparin  Injectable 5000 Unit(s) SubCutaneous every 12 hours  latanoprost 0.005% Ophthalmic Solution 1 Drop(s) Both EYES at bedtime  methylPREDNISolone sodium succinate Injectable 40 milliGRAM(s) IV Push every 12 hours  montelukast 10 milliGRAM(s) Oral daily                            13.2   6.72  )-----------( 269      ( 30 Dec 2019 08:06 )             41.0         139  |  105  |  28<H>  ----------------------------<  95  4.7   |  31  |  0.56    Ca    9.1      30 Dec 2019 08:06

## 2019-12-30 NOTE — PROGRESS NOTE ADULT - SUBJECTIVE AND OBJECTIVE BOX
HPI: Pt is an 84 y/o F with a PMHx of IBS, COPD/asthma, hx of walking Pna in the past  who was  sent to the ED by MD Griffin c/o wheezing and SOB worsening over the past 3 days. Pt saw MD Griffin  and had an outpt CT done which showed apparent new lung nodule concerning for infection and pna.  Pt completed a 10 day course of Prednisone  for asthma.  Then she  notes that symptoms began the day after she finished the Prednisone last week. She reports  that when she coughs, she loses her breath and SOB is exacerbated by ambulating. Pt  did not use her home ProAir inhaler.  She is not on home O2.  Pt c/o  productive yellow cough yellow  and decreased PO intake. Pt denies CP, weakness, dizziness, fever, congestion, or orthopnea. No urinary complaints, no rash.    12/24: no new complaints    12/25: condition same    12/26: sob persists  repeat cxr shows right lobar mass ? Cancer    12/27: no complaints    12/28: no complaints    12/29: c/o constipation    12/30: no BM; add MOM  c/o diff bringing up phlegm, mucinex added      PHYSICAL EXAM:    Vital Signs Last 24 Hrs  T(C): 36.8 (30 Dec 2019 11:57), Max: 36.8 (30 Dec 2019 11:57)  T(F): 98.2 (30 Dec 2019 11:57), Max: 98.2 (30 Dec 2019 11:57)  HR: 52 (30 Dec 2019 11:57) (52 - 102)  BP: 125/73 (30 Dec 2019 11:57) (125/73 - 169/93)  BP(mean): --  RR: 19 (30 Dec 2019 11:57) (18 - 20)  SpO2: 94% (30 Dec 2019 11:57) (94% - 97%)    Constitutional: Weak appearing  HEENT: Atraumatic, LEI, Normal, No congestion  Respiratory: Breath Sounds normal, no rhonchi/wheeze  Cardiovascular: N S1S2;   Gastrointestinal: Abdomen soft, non tender, Bowel Sounds present  Extremities: No edema, peripheral pulses present  Neurological: AAO x 3, no gross focal motor deficits  Skin: Non cellulitic, no rash, ulcers  Lymph Nodes: No lymphadenopathy noted  Back: No CVA tenderness   Musculoskeletal: non tender  Breasts: Deferred  Genitourinary: deferred  Rectal: Deferred                                13.2   6.72   )----------(  269       ( 30 Dec 2019 08:06 )               41.0      139    |  105    |  28     ----------------------------<  95         ( 30 Dec 2019 08:06 )  4.7     |  31     |  0.56     Ca    9.1        ( 30 Dec 2019 08:06 )                              MICROBIOLOGY/CULTURES:  Culture Results:   <10,000 CFU/mL Normal Urogenital Karishma (12-23 @ 14:57)  Culture Results:   No growth to date. (12-23 @ 13:55)  Culture Results:   No growth to date. (12-23 @ 13:55)          MEDICATIONS  (STANDING):  acetylcysteine 10%  Inhalation 3 milliLiter(s) Inhalation four times a day  albuterol/ipratropium for Nebulization 3 milliLiter(s) Nebulizer every 6 hours  budesonide 160 MICROgram(s)/formoterol 4.5 MICROgram(s) Inhaler 2 Puff(s) Inhalation two times a day  cefTRIAXone Injectable. 2000 milliGRAM(s) IV Push every 24 hours  DULoxetine 30 milliGRAM(s) Oral daily  guaiFENesin  milliGRAM(s) Oral every 12 hours  heparin  Injectable 5000 Unit(s) SubCutaneous every 12 hours  latanoprost 0.005% Ophthalmic Solution 1 Drop(s) Both EYES at bedtime  methylPREDNISolone sodium succinate Injectable 40 milliGRAM(s) IV Push every 12 hours  montelukast 10 milliGRAM(s) Oral daily    MEDICATIONS  (PRN):  ALPRAZolam 0.25 milliGRAM(s) Oral every 8 hours PRN anxiety  benzonatate 100 milliGRAM(s) Oral three times a day PRN Cough  gabapentin 300 milliGRAM(s) Oral daily PRN back pain  magnesium hydroxide Suspension 30 milliLiter(s) Oral daily PRN Constipation  polyethylene glycol 3350 17 Gram(s) Oral daily PRN Constipation

## 2019-12-31 RX ORDER — FENTANYL CITRATE 50 UG/ML
1 INJECTION INTRAVENOUS
Refills: 0 | Status: DISCONTINUED | OUTPATIENT
Start: 2019-12-31 | End: 2020-01-03

## 2019-12-31 RX ORDER — LACTULOSE 10 G/15ML
10 SOLUTION ORAL THREE TIMES A DAY
Refills: 0 | Status: DISCONTINUED | OUTPATIENT
Start: 2019-12-31 | End: 2020-01-03

## 2019-12-31 RX ADMIN — MONTELUKAST 10 MILLIGRAM(S): 4 TABLET, CHEWABLE ORAL at 11:48

## 2019-12-31 RX ADMIN — Medication 3 MILLILITER(S): at 21:58

## 2019-12-31 RX ADMIN — HEPARIN SODIUM 5000 UNIT(S): 5000 INJECTION INTRAVENOUS; SUBCUTANEOUS at 05:27

## 2019-12-31 RX ADMIN — Medication 3 MILLILITER(S): at 09:42

## 2019-12-31 RX ADMIN — FENTANYL CITRATE 1 PATCH: 50 INJECTION INTRAVENOUS at 11:57

## 2019-12-31 RX ADMIN — HEPARIN SODIUM 5000 UNIT(S): 5000 INJECTION INTRAVENOUS; SUBCUTANEOUS at 16:37

## 2019-12-31 RX ADMIN — CEFTRIAXONE 2000 MILLIGRAM(S): 500 INJECTION, POWDER, FOR SOLUTION INTRAMUSCULAR; INTRAVENOUS at 11:53

## 2019-12-31 RX ADMIN — FENTANYL CITRATE 1 PATCH: 50 INJECTION INTRAVENOUS at 06:53

## 2019-12-31 RX ADMIN — DULOXETINE HYDROCHLORIDE 30 MILLIGRAM(S): 30 CAPSULE, DELAYED RELEASE ORAL at 11:48

## 2019-12-31 RX ADMIN — BUDESONIDE AND FORMOTEROL FUMARATE DIHYDRATE 2 PUFF(S): 160; 4.5 AEROSOL RESPIRATORY (INHALATION) at 09:42

## 2019-12-31 RX ADMIN — BUDESONIDE AND FORMOTEROL FUMARATE DIHYDRATE 2 PUFF(S): 160; 4.5 AEROSOL RESPIRATORY (INHALATION) at 22:01

## 2019-12-31 RX ADMIN — Medication 600 MILLIGRAM(S): at 16:37

## 2019-12-31 RX ADMIN — FENTANYL CITRATE 1 PATCH: 50 INJECTION INTRAVENOUS at 16:59

## 2019-12-31 RX ADMIN — Medication 40 MILLIGRAM(S): at 05:27

## 2019-12-31 RX ADMIN — Medication 40 MILLIGRAM(S): at 16:38

## 2019-12-31 RX ADMIN — FENTANYL CITRATE 1 PATCH: 50 INJECTION INTRAVENOUS at 17:56

## 2019-12-31 RX ADMIN — Medication 3 MILLILITER(S): at 14:02

## 2019-12-31 RX ADMIN — Medication 600 MILLIGRAM(S): at 05:28

## 2019-12-31 RX ADMIN — Medication 3 MILLILITER(S): at 21:59

## 2019-12-31 RX ADMIN — LACTULOSE 10 GRAM(S): 10 SOLUTION ORAL at 17:54

## 2019-12-31 RX ADMIN — LATANOPROST 1 DROP(S): 0.05 SOLUTION/ DROPS OPHTHALMIC; TOPICAL at 21:18

## 2019-12-31 NOTE — PROGRESS NOTE ADULT - ASSESSMENT
-asthma exacerbation, secondary to RSV  patient has mild improvement with the wheezing and still feels difficulty with the expectoration   -New masslike lesions/consolidation infectious/neoplastic  -Known bilateral lung nodules being followed up as an outpatient  -chronic back pain on opiates    Plan  - patient completed course of antibiotics for the suspected pneumonia   -continue with solumedrol, and taper to once daily dosing   -Continue with Symbicort and montelukast her baseline medication for the asthma   -02 supplementation  and check the O2 sat on room air and with ambulation for probable need for evaluation for home O2 therapy  -continue duonebs   -follow up CT in 6-8 weeks  for new masslike lesions in upper lobe and middle lobe and if persistent would consider obtaining the biopsy as an out patient   - encouraged ambulation with physical therapy with walker  - treatment of constipation as per the medicine

## 2019-12-31 NOTE — PROGRESS NOTE ADULT - SUBJECTIVE AND OBJECTIVE BOX
Date of service: 19 @ 14:43    Lying in bed in NAD  Weak looking  Cough is improving  No SOB at rest    ROS: no fever or chills; denies dizziness, no HA, no abdominal pain, no diarrhea or constipation; no dysuria, no legs pain, no rashes    MEDICATIONS  (STANDING):  acetylcysteine 10%  Inhalation 3 milliLiter(s) Inhalation four times a day  albuterol/ipratropium for Nebulization 3 milliLiter(s) Nebulizer every 6 hours  budesonide 160 MICROgram(s)/formoterol 4.5 MICROgram(s) Inhaler 2 Puff(s) Inhalation two times a day  cefTRIAXone Injectable. 2000 milliGRAM(s) IV Push every 24 hours  DULoxetine 30 milliGRAM(s) Oral daily  guaiFENesin  milliGRAM(s) Oral every 12 hours  heparin  Injectable 5000 Unit(s) SubCutaneous every 12 hours  latanoprost 0.005% Ophthalmic Solution 1 Drop(s) Both EYES at bedtime  methylPREDNISolone sodium succinate Injectable 40 milliGRAM(s) IV Push every 12 hours  montelukast 10 milliGRAM(s) Oral daily      Vital Signs Last 24 Hrs  T(C): 37.1 (31 Dec 2019 11:33), Max: 37.1 (31 Dec 2019 11:33)  T(F): 98.8 (31 Dec 2019 11:33), Max: 98.8 (31 Dec 2019 11:33)  HR: 118 (31 Dec 2019 11:33) (81 - 118)  BP: 151/71 (31 Dec 2019 11:33) (117/59 - 151/71)  BP(mean): --  RR: 20 (31 Dec 2019 11:33) (18 - 20)  SpO2: 95% (31 Dec 2019 11:33) (95% - 98%)    Physical Exam:      Constitutional: frail looking  HEENT: NC/AT, EOMI, PERRLA, conjunctivae clear  Neck: supple; thyroid not palpable  Back: no tenderness  Respiratory: respiratory effort normal; few basal crackles  Cardiovascular: S1S2 regular, no murmurs  Abdomen: soft, not tender, not distended, positive BS  Genitourinary: no suprapubic tenderness  Lymphatic: no LN palpable  Musculoskeletal: no muscle tenderness, no joint swelling or tenderness  Extremities: no pedal edema  Neurological/ Psychiatric: AxOx3, moving all extremities  Skin: no rashes; no palpable lesions    Labs: reviewed                        13.4   6.07  )-----------( 242      ( 23 Dec 2019 13:55 )             40.3         135  |  104  |  16  ----------------------------<  85  3.9   |  25  |  0.67    Ca    8.8      23 Dec 2019 13:55    TPro  6.7  /  Alb  3.2<L>  /  TBili  0.6  /  DBili  x   /  AST  23  /  ALT  23  /  AlkPhos  74  1223     LIVER FUNCTIONS - ( 23 Dec 2019 13:55 )  Alb: 3.2 g/dL / Pro: 6.7 gm/dL / ALK PHOS: 74 U/L / ALT: 23 U/L / AST: 23 U/L / GGT: x           Urinalysis Basic - ( 23 Dec 2019 14:57 )    Color: Yellow / Appearance: Clear / S.015 / pH: x  Gluc: x / Ketone: Small  / Bili: Negative / Urobili: Negative mg/dL   Blood: x / Protein: 15 mg/dL / Nitrite: Negative   Leuk Esterase: Negative / RBC: 3-5 /HPF / WBC 0-2   Sq Epi: x / Non Sq Epi: Negative / Bacteria: Negative    Rapid Respiratory Viral Panel (19 @ 14:57)    Rapid RVP Result: Detected:     Resp Syncytial Virus (RapRVP): Detected      Culture - Urine (collected 23 Dec 2019 14:57)  Source: .Urine None  Final Report (24 Dec 2019 21:02):    <10,000 CFU/mL Normal Urogenital Karishma    Culture - Blood (collected 23 Dec 2019 13:55)  Source: .Blood Blood-Venous  Preliminary Report (25 Dec 2019 01:02):    No growth to date.    Culture - Blood (collected 23 Dec 2019 13:55)  Source: .Blood Blood-Peripheral  Preliminary Report (25 Dec 2019 01:02):    No growth to date.    Radiology: all available radiological tests reviewed    < from: Xray Chest 1 View- PORTABLE-Routine (19 @ 13:33) >  RIGHT lower lobe opacity/mass seen on prior 2019 concerning for carcinoma.    < end of copied text >    Advanced directives addressed: full resuscitation

## 2019-12-31 NOTE — PROGRESS NOTE ADULT - ASSESSMENT
Pt is admitted w/    Resp Distress, hypoxia 87%  Comm Aqc Pna, failing out pt tx,     RSV pneumonitis with superimposed Pneumonia  Fever  Astma Exacerbation  New masslike lesions/consolidation infectious/neoplastic  r/o lung CA  Anxiety  Constipation    PLAN:   - admit  - iv steroids, nebs  cont rocephin as per ID; to be completed on 12/31  - CT chest in 6-8 weeks to document resolution  - ID and pulm consult appreciated  - DVT prophylaxis: heparin s/q  add xanax prn  mucinex po bid  miralax prn daily; document BM, added MOM, No BM; add enema and lactulose; if still no BM then GI consult/CT abdo    poc discussed with pt, team,

## 2019-12-31 NOTE — PROGRESS NOTE ADULT - ASSESSMENT
84 y/o Female with h/o IBS, COPD/asthma, prior Pna was admitted on 12/23 after she was sent to the ED by MD Griffin for wheezing and SOB worsening over the past 3 days. Pt saw MD Griffin  and had an outpt CT done which showed new lung infiltration concerning for infection and pna.  Pt completed a 10 day course of Prednisone  for asthma with improvement in her respiratory condition.. She became more SOB the day after she finished the Prednisone the week PTA. She reports  that when she coughs, she loses her breath and SOB is exacerbated by ambulating. Pt  did not use her home ProAir inhaler. She is not on home O2.  Pt has  productive yellow cough and decreased PO intake. In ER she received cefepime and vancomycin IV.     1. Pneumonitis with RSV improving. Probable underlying bacterial superimposed pneumonia. COPD.  -dyspnea improving  -cough is dry  -f/u BC x 2, sputum c/s  -on ceftriaxone 2 gm IV qd # 7  -tolerating abx well so far; no side effects noted  -respiratory care  -repeat CXR reviewed  -may d/c droplet isolation  -complete abx therapy today  -monitor temps  -f/u CBC  -supportive care  2. Other issues:   -care per medicine

## 2019-12-31 NOTE — PROGRESS NOTE ADULT - SUBJECTIVE AND OBJECTIVE BOX
HPI: Pt is an 84 y/o F with a PMHx of IBS, COPD/asthma, hx of walking Pna in the past  who was  sent to the ED by MD Griffin c/o wheezing and SOB worsening over the past 3 days. Pt saw MD Griffin  and had an outpt CT done which showed apparent new lung nodule concerning for infection and pna.  Pt completed a 10 day course of Prednisone  for asthma.  Then she  notes that symptoms began the day after she finished the Prednisone last week. She reports  that when she coughs, she loses her breath and SOB is exacerbated by ambulating. Pt  did not use her home ProAir inhaler.  She is not on home O2.  Pt c/o  productive yellow cough yellow  and decreased PO intake. Pt denies CP, weakness, dizziness, fever, congestion, or orthopnea. No urinary complaints, no rash.    12/24: no new complaints    12/25: condition same    12/26: sob persists  repeat cxr shows right lobar mass ? Cancer    12/27: no complaints    12/28: no complaints    12/29: c/o constipation    12/30: no BM; add MOM  c/o diff bringing up phlegm, mucinex added    12/31: No BM still; add lactulose on top of MOM, Miralax tap water enema today, document BM  still has difficulty bringing up phlegm    PHYSICAL EXAM:    Vital Signs Last 24 Hrs  T(C): 37.1 (31 Dec 2019 11:33), Max: 37.1 (31 Dec 2019 11:33)  T(F): 98.8 (31 Dec 2019 11:33), Max: 98.8 (31 Dec 2019 11:33)  HR: 82 (31 Dec 2019 14:05) (81 - 118)  BP: 151/71 (31 Dec 2019 11:33) (117/59 - 151/71)  BP(mean): --  RR: 20 (31 Dec 2019 11:33) (18 - 20)  SpO2: 95% (31 Dec 2019 11:33) (95% - 98%)    Constitutional: Weak appearing  HEENT: Atraumatic, LEI, Normal, No congestion  Respiratory: Breath Sounds normal, no rhonchi/wheeze  Cardiovascular: N S1S2;   Gastrointestinal: Abdomen soft, non tender, Bowel Sounds present  Extremities: No edema, peripheral pulses present  Neurological: AAO x 3, no gross focal motor deficits  Skin: Non cellulitic, no rash, ulcers  Lymph Nodes: No lymphadenopathy noted  Back: No CVA tenderness   Musculoskeletal: non tender  Breasts: Deferred  Genitourinary: deferred  Rectal: Deferred                 Lab Results:  CBC  CBC Full  -  ( 30 Dec 2019 08:06 )  WBC Count : 6.72 K/uL  RBC Count : 4.46 M/uL  Hemoglobin : 13.2 g/dL  Hematocrit : 41.0 %  Platelet Count - Automated : 269 K/uL  Mean Cell Volume : 91.9 fl  Mean Cell Hemoglobin : 29.6 pg  Mean Cell Hemoglobin Concentration : 32.2 gm/dL  Auto Neutrophil # : x  Auto Lymphocyte # : x  Auto Monocyte # : x  Auto Eosinophil # : x  Auto Basophil # : x  Auto Neutrophil % : x  Auto Lymphocyte % : x  Auto Monocyte % : x  Auto Eosinophil % : x  Auto Basophil % : x    .		Differential:	[] Automated		[] Manual  Chemistry  12-30    139  |  105  |  28<H>  ----------------------------<  95  4.7   |  31  |  0.56    Ca    9.1      30 Dec 2019 08:06                                MICROBIOLOGY/CULTURES:  Culture Results:   <10,000 CFU/mL Normal Urogenital Karishma (12-23 @ 14:57)  Culture Results:   No growth to date. (12-23 @ 13:55)  Culture Results:   No growth to date. (12-23 @ 13:55)          MEDICATIONS  (STANDING):  acetylcysteine 10%  Inhalation 3 milliLiter(s) Inhalation four times a day  albuterol/ipratropium for Nebulization 3 milliLiter(s) Nebulizer every 6 hours  budesonide 160 MICROgram(s)/formoterol 4.5 MICROgram(s) Inhaler 2 Puff(s) Inhalation two times a day  DULoxetine 30 milliGRAM(s) Oral daily  fentaNYL   Patch  25 MICROgram(s)/Hr 1 Patch Transdermal every 72 hours  guaiFENesin  milliGRAM(s) Oral every 12 hours  heparin  Injectable 5000 Unit(s) SubCutaneous every 12 hours  lactulose Syrup 10 Gram(s) Oral three times a day  latanoprost 0.005% Ophthalmic Solution 1 Drop(s) Both EYES at bedtime  methylPREDNISolone sodium succinate Injectable 40 milliGRAM(s) IV Push every 12 hours  montelukast 10 milliGRAM(s) Oral daily    MEDICATIONS  (PRN):  ALPRAZolam 0.25 milliGRAM(s) Oral every 8 hours PRN anxiety  benzonatate 100 milliGRAM(s) Oral three times a day PRN Cough  gabapentin 300 milliGRAM(s) Oral daily PRN back pain  magnesium hydroxide Suspension 30 milliLiter(s) Oral daily PRN Constipation  polyethylene glycol 3350 17 Gram(s) Oral daily PRN Constipation

## 2019-12-31 NOTE — PROGRESS NOTE ADULT - SUBJECTIVE AND OBJECTIVE BOX
SUBJECTIVE     Patient feels slight better with the less wheezing and still has cough   continue to complain of constipation   walked today and felt exhausted       PAST MEDICAL & SURGICAL HISTORY:  COPD (chronic obstructive pulmonary disease)  Walking pneumonia  IBS (irritable bowel syndrome)  Chronic back pain  Asthma  Fracture of left wrist, closed, initial encounter: s/p repair x 2  Closed fracture of right femur, unspecified fracture morphology, unspecified portion of femur, initial encounter   delivery delivered: x 2  Bakers cyst  S/P cataract surgery: left  History of back surgery    OBJECTIVE   Vital Signs Last 24 Hrs  T(C): 37.1 (31 Dec 2019 11:33), Max: 37.1 (31 Dec 2019 11:33)  T(F): 98.8 (31 Dec 2019 11:33), Max: 98.8 (31 Dec 2019 11:33)  HR: 82 (31 Dec 2019 14:05) (81 - 118)  BP: 151/71 (31 Dec 2019 11:33) (117/59 - 151/71)  BP(mean): --  RR: 20 (31 Dec 2019 11:33) (18 - 20)  SpO2: 95% (31 Dec 2019 11:33) (95% - 96%)    Review of systems   as dictated in the history of present illness with the review of other systems non contributory     PHYSICAL EXAM:  Constitutional: , awake and alert, not in distress comfortable on the nasal canula   HEENT: Normo cephalic atraumatic  Neck: Soft and supple, No J.V.D   Respiratory: vesicular breathing , No wheezing, rales or rhonchi today and has distant breath sounds today   Cardiovascular: S1 and S2, regular rate .   Gastrointestinal:  soft, nontender,   Extremities: No  edema or calf tenderness .  Neurological: No new  focal deficits.    MEDICATIONS  (STANDING):  acetylcysteine 10%  Inhalation 3 milliLiter(s) Inhalation four times a day  albuterol/ipratropium for Nebulization 3 milliLiter(s) Nebulizer every 6 hours  budesonide 160 MICROgram(s)/formoterol 4.5 MICROgram(s) Inhaler 2 Puff(s) Inhalation two times a day  DULoxetine 30 milliGRAM(s) Oral daily  fentaNYL   Patch  25 MICROgram(s)/Hr 1 Patch Transdermal every 72 hours  guaiFENesin  milliGRAM(s) Oral every 12 hours  heparin  Injectable 5000 Unit(s) SubCutaneous every 12 hours  lactulose Syrup 10 Gram(s) Oral three times a day  latanoprost 0.005% Ophthalmic Solution 1 Drop(s) Both EYES at bedtime  methylPREDNISolone sodium succinate Injectable 40 milliGRAM(s) IV Push every 12 hours  montelukast 10 milliGRAM(s) Oral daily                            13.2   6.72  )-----------( 269      ( 30 Dec 2019 08:06 )             41.0     12-30    139  |  105  |  28<H>  ----------------------------<  95  4.7   |  31  |  0.56    Ca    9.1      30 Dec 2019 08:06

## 2020-01-01 RX ADMIN — Medication 600 MILLIGRAM(S): at 05:54

## 2020-01-01 RX ADMIN — BUDESONIDE AND FORMOTEROL FUMARATE DIHYDRATE 2 PUFF(S): 160; 4.5 AEROSOL RESPIRATORY (INHALATION) at 19:44

## 2020-01-01 RX ADMIN — Medication 600 MILLIGRAM(S): at 18:20

## 2020-01-01 RX ADMIN — MONTELUKAST 10 MILLIGRAM(S): 4 TABLET, CHEWABLE ORAL at 13:09

## 2020-01-01 RX ADMIN — LACTULOSE 10 GRAM(S): 10 SOLUTION ORAL at 05:54

## 2020-01-01 RX ADMIN — Medication 3 MILLILITER(S): at 20:34

## 2020-01-01 RX ADMIN — FENTANYL CITRATE 1 PATCH: 50 INJECTION INTRAVENOUS at 17:32

## 2020-01-01 RX ADMIN — Medication 3 MILLILITER(S): at 19:45

## 2020-01-01 RX ADMIN — HEPARIN SODIUM 5000 UNIT(S): 5000 INJECTION INTRAVENOUS; SUBCUTANEOUS at 18:20

## 2020-01-01 RX ADMIN — Medication 3 MILLILITER(S): at 13:49

## 2020-01-01 RX ADMIN — Medication 40 MILLIGRAM(S): at 05:53

## 2020-01-01 RX ADMIN — HEPARIN SODIUM 5000 UNIT(S): 5000 INJECTION INTRAVENOUS; SUBCUTANEOUS at 05:53

## 2020-01-01 RX ADMIN — Medication 3 MILLILITER(S): at 08:57

## 2020-01-01 RX ADMIN — Medication 40 MILLIGRAM(S): at 18:21

## 2020-01-01 RX ADMIN — LATANOPROST 1 DROP(S): 0.05 SOLUTION/ DROPS OPHTHALMIC; TOPICAL at 21:19

## 2020-01-01 RX ADMIN — DULOXETINE HYDROCHLORIDE 30 MILLIGRAM(S): 30 CAPSULE, DELAYED RELEASE ORAL at 13:10

## 2020-01-01 RX ADMIN — BUDESONIDE AND FORMOTEROL FUMARATE DIHYDRATE 2 PUFF(S): 160; 4.5 AEROSOL RESPIRATORY (INHALATION) at 08:57

## 2020-01-01 RX ADMIN — FENTANYL CITRATE 1 PATCH: 50 INJECTION INTRAVENOUS at 07:09

## 2020-01-01 RX ADMIN — Medication 3 MILLILITER(S): at 08:55

## 2020-01-01 NOTE — PROGRESS NOTE ADULT - ATTENDING COMMENTS
patient seen and examined with PA student Kristy Wagner. I was physically present for the key portions of the evaluation and management (E/M) service provided.  I agree with the above history, physical, and plan which I have reviewed and edited where appropriate.  - pulm input noted and will continue solumedrol for now and possible change to prednisone in AM  - check ambulatory o2 sat in AM prior to dc

## 2020-01-01 NOTE — PROGRESS NOTE ADULT - SUBJECTIVE AND OBJECTIVE BOX
TK JEFFREY  MRN: [id number]    S: 2020: Comfortable at rest but c/o dyspnea when walking in the [place] and when going to the bathroom. Feels "worse than yesterday". No c/o chest pain. Having trouble coughing up secretions.     PAST MEDICAL & SURGICAL HISTORY:  COPD (chronic obstructive pulmonary disease)  Walking pneumonia  IBS (irritable bowel syndrome)  Chronic back pain  Asthma  Fracture of left wrist, closed, initial encounter: s/p repair x 2  Closed fracture of right femur, unspecified fracture morphology, unspecified portion of femur, initial encounter   delivery delivered: x 2  Bakers cyst  S/P cataract surgery: left  History of back surgery      O: T(C): 36.6 (20 @ 12:08), Max: 36.7 (19 @ 21:42)  HR: 84 (20 @ 13:50) (67 - 106)  BP: 111/60 (20 @ 12:08) (111/60 - 139/75)  RR: 18 (20 @ 12:08) (18 - 19)  SpO2: 95% (20 @ 12:08) (95% - 97%)  Wt(kg): --    PHYSICAL EXAM:      GENERAL: no respiratory distress    NEURO: awake and alert    NECK: no JVD    CHEST: scattered wheezing    CARDIAC: RR    EXT: no edema      < from: Xray Chest 1 View- PORTABLE-Routine (19 @ 13:33) >  EXAM:  XR CHEST PORTABLE ROUTINE 1V                            PROCEDURE DATE:  2019          INTERPRETATION:  Chest radiograph        CLINICAL INFORMATION: Short of breath.     TECHNIQUE:  PA view of the chest     FINDINGS:  No previous examinations are available for review.    The lungs are show wedge-shaped opacity RIGHT lung base. Review of prior CT scan 2019 shows a RIGHT middle lobe a mass measuring 3.4 x 1.8 cm concerning for carcinoma. Additional smaller nodules are seen on chest CT scan are not appreciated on plain film radiography.      The heart and mediastinum appear intact.  Aortic arch calcified.    Visualized osseous structures are intact.    IMPRESSION:    RIGHT lower lobe opacity/mass seen on prior 2019 concerning for carcinoma.      KAMILLA GONZALEZ     < end of copied text >      MEDICATIONS  (STANDING):  acetylcysteine 10%  Inhalation 3 milliLiter(s) Inhalation four times a day  albuterol/ipratropium for Nebulization 3 milliLiter(s) Nebulizer every 6 hours  budesonide 160 MICROgram(s)/formoterol 4.5 MICROgram(s) Inhaler 2 Puff(s) Inhalation two times a day  DULoxetine 30 milliGRAM(s) Oral daily  fentaNYL   Patch  25 MICROgram(s)/Hr 1 Patch Transdermal every 72 hours  guaiFENesin  milliGRAM(s) Oral every 12 hours  heparin  Injectable 5000 Unit(s) SubCutaneous every 12 hours  lactulose Syrup 10 Gram(s) Oral three times a day  latanoprost 0.005% Ophthalmic Solution 1 Drop(s) Both EYES at bedtime  methylPREDNISolone sodium succinate Injectable 40 milliGRAM(s) IV Push daily  montelukast 10 milliGRAM(s) Oral daily    MEDICATIONS  (PRN):  ALPRAZolam 0.25 milliGRAM(s) Oral every 8 hours PRN anxiety  benzonatate 100 milliGRAM(s) Oral three times a day PRN Cough  gabapentin 300 milliGRAM(s) Oral daily PRN back pain  magnesium hydroxide Suspension 30 milliLiter(s) Oral daily PRN Constipation  polyethylene glycol 3350 17 Gram(s) Oral daily PRN Constipation        A/P: 1. Asthma exacerbation secondary to RSV infection. More short of breath today; solumedrol dose decreased yesterday. Will resume twice daily dosing of solumedrol. Continue nebulized bronchodilators, mucolytics. O2 supplement.     2. RLL density. Will need outpatient follow up imaging.     D/W Dr. Gonzalez. Dr. Gaby jasmine [facility] up in AM.

## 2020-01-01 NOTE — PROGRESS NOTE ADULT - SUBJECTIVE AND OBJECTIVE BOX
HPI: Pt is an 84 y/o F with a PMHx of IBS, COPD/asthma, hx of walking Pna in the past  who was  sent to the ED by MD Griffin c/o wheezing and SOB worsening over the past 3 days. Pt saw MD Griffin  and had an outpt CT done which showed apparent new lung nodule concerning for infection and pna.  Pt completed a 10 day course of Prednisone  for asthma.  Then she  notes that symptoms began the day after she finished the Prednisone last week. She reports  that when she coughs, she loses her breath and SOB is exacerbated by ambulating. Pt  did not use her home ProAir inhaler.  She is not on home O2.  Pt c/o  productive yellow cough yellow  and decreased PO intake. Pt denies CP, weakness, dizziness, fever, congestion, or orthopnea. No urinary complaints, no rash.    12/24: no new complaints  12/25: condition same  12/26: sob persists repeat cxr shows right lobar mass ? Cancer  12/27: no complaints  12/28: no complaints  12/29: c/o constipation  12/30: no BM; add MOM c/o diff bringing up phlegm, mucinex added  12/31: No BM still; add lactulose on top of MOM, Miralax tap water enema today, document BM still has difficulty bringing up phlegm  1/1: Patient seen and examined at bedside. Patient reports feeling more SOB than she felt before coming into the hospital. She reports feeling this way since this morning. She reports not being able to walk herself to the bathroom this morning due to the SOB. Patient denies chest pains, headaches, abdominal pain, nausea, vomiting, diarrhea, calf pains.    PHYSICAL EXAM:    Vital Signs Last 24 Hrs  T(C): 36.3 (01 Jan 2020 06:34), Max: 37.1 (31 Dec 2019 11:33)  T(F): 97.3 (01 Jan 2020 06:34), Max: 98.8 (31 Dec 2019 11:33)  HR: 86 (01 Jan 2020 08:55) (67 - 118)  BP: 139/75 (01 Jan 2020 06:34) (112/70 - 151/71)  BP(mean): --  RR: 19 (01 Jan 2020 06:34) (19 - 20)      Constitutional: Weak appearing, using accessory muscles to aid breathing.   HEENT: Atraumatic, No congestion  Respiratory: labored breathing. breath sounds equal b/l. no wheeze  Cardiovascular: N S1S2; No murmur rubs gallops   Gastrointestinal: Abdomen soft, non tender, Bowel Sounds present  Extremities: No edema   Neurological: AAO x 3, no gross focal motor deficits  Skin: Non cellulitic, no rash, ulcers  Musculoskeletal: no calf pain  Breasts: Deferred  Genitourinary: deferred  Rectal: Deferred           LABS   Basic Metabolic Panel in AM (12.30.19 @ 08:06)    Sodium, Serum: 139 mmol/L    Potassium, Serum: 4.7 mmol/L    Chloride, Serum: 105 mmol/L    Carbon Dioxide, Serum: 31 mmol/L    Anion Gap, Serum: 3 mmol/L    Blood Urea Nitrogen, Serum: 28 mg/dL    Creatinine, Serum: 0.56 mg/dL    Glucose, Serum: 95 mg/dL    Calcium, Total Serum: 9.1 mg/dL    eGFR if Non : 85: Interpretative comment    Complete Blood Count in AM (12.30.19 @ 08:06)    WBC Count: 6.72 K/uL    RBC Count: 4.46 M/uL    Hemoglobin: 13.2 g/dL    Hematocrit: 41.0 %    Mean Cell Volume: 91.9 fl    Mean Cell Hemoglobin: 29.6 pg    Mean Cell Hemoglobin Conc: 32.2 gm/dL    Red Cell Distrib Width: 13.2 %    Platelet Count - Automated: 269 K/uL    Prothrombin Time and INR, Plasma in AM (12.23.19 @ 13:55)    Prothrombin Time, Plasma: 10.8: Effective October 30th, 2018 the reference range for PT has changed. sec    INR: 0.97      MICROBIOLOGY/CULTURES:  Culture Results:   <10,000 CFU/mL Normal Urogenital Karishma (12-23 @ 14:57)  Culture Results:   No growth to date. (12-23 @ 13:55)  Culture Results:   No growth to date. (12-23 @ 13:55)    RADIOLOGY  < from: Xray Chest 1 View- PORTABLE-Routine (12.26.19 @ 13:33) >  IMPRESSION:    RIGHT lower lobe opacity/mass seen on prior 12/23/2019 concerning for carcinoma.      MEDICATIONS  (STANDING):  acetylcysteine 10%  Inhalation 3 milliLiter(s) Inhalation four times a day  albuterol/ipratropium for Nebulization 3 milliLiter(s) Nebulizer every 6 hours  budesonide 160 MICROgram(s)/formoterol 4.5 MICROgram(s) Inhaler 2 Puff(s) Inhalation two times a day  DULoxetine 30 milliGRAM(s) Oral daily  fentaNYL   Patch  25 MICROgram(s)/Hr 1 Patch Transdermal every 72 hours  guaiFENesin  milliGRAM(s) Oral every 12 hours  heparin  Injectable 5000 Unit(s) SubCutaneous every 12 hours  lactulose Syrup 10 Gram(s) Oral three times a day  latanoprost 0.005% Ophthalmic Solution 1 Drop(s) Both EYES at bedtime  methylPREDNISolone sodium succinate Injectable 40 milliGRAM(s) IV Push daily  montelukast 10 milliGRAM(s) Oral daily    MEDICATIONS  (PRN):  ALPRAZolam 0.25 milliGRAM(s) Oral every 8 hours PRN anxiety  benzonatate 100 milliGRAM(s) Oral three times a day PRN Cough  gabapentin 300 milliGRAM(s) Oral daily PRN back pain  magnesium hydroxide Suspension 30 milliLiter(s) Oral daily PRN Constipation  polyethylene glycol 3350 17 Gram(s) Oral daily PRN Constipation

## 2020-01-01 NOTE — PROGRESS NOTE ADULT - ASSESSMENT
Pt is admitted w/  #Resp Distress, hypoxia 87%//Astma Exacerbation  - admit  - iv steroids, nebs  - Mucinex po bid  -Pulm consult appreciated      #CAP, failing out pt tx// RSV pneumonitis with superimposed Pneumonia  - RVP postive  - Resp syncytial virus positive  - Urine cx negtive  - Completed Rocephin as per ID    #New masslike lesions/consolidation infectious/neoplastic  - CT -> Right lower lung nodule.  - r/o lung CA  - CT chest in 6-8 weeks to document resolution  - ID consult appreciated    #Anxiety  -add xanax prn    #Constipation  -miralax prn daily; document BM, added MOM, No BM; add enema and lactulose; if still no BM then GI consult/CT abdo    #DVT prophylaxis  -heparin s/q    poc discussed with pt, team, Pt is admitted w/  #Resp Distress, hypoxia 87%//Astma Exacerbation  - admit  - iv solumedrol  - Joaquín, Symbicort, montelukast    - Mucinex po bid  -Pulm consult appreciated      #CAP, failing out pt tx// RSV pneumonitis with superimposed Pneumonia  - RVP postive  - Resp syncytial virus positive  - Urine cx negtive  - Completed Rocephin as per ID    #New masslike lesions/consolidation infectious/neoplastic  - CT -> Right lower lung nodule.  - r/o lung CA  - CT chest in 6-8 weeks to document resolution  - ID consult appreciated    #Anxiety  -add xanax prn    #Constipation  -miralax prn daily; document BM, added MOM, No BM; add enema and lactulose; if still no BM then GI consult/CT abdo    #DVT prophylaxis  -heparin s/q    poc discussed with pt, team,

## 2020-01-02 PROCEDURE — 71045 X-RAY EXAM CHEST 1 VIEW: CPT | Mod: 26

## 2020-01-02 RX ADMIN — Medication 600 MILLIGRAM(S): at 06:01

## 2020-01-02 RX ADMIN — GABAPENTIN 300 MILLIGRAM(S): 400 CAPSULE ORAL at 19:05

## 2020-01-02 RX ADMIN — MONTELUKAST 10 MILLIGRAM(S): 4 TABLET, CHEWABLE ORAL at 11:49

## 2020-01-02 RX ADMIN — Medication 3 MILLILITER(S): at 14:03

## 2020-01-02 RX ADMIN — Medication 40 MILLIGRAM(S): at 06:01

## 2020-01-02 RX ADMIN — HEPARIN SODIUM 5000 UNIT(S): 5000 INJECTION INTRAVENOUS; SUBCUTANEOUS at 06:00

## 2020-01-02 RX ADMIN — FENTANYL CITRATE 1 PATCH: 50 INJECTION INTRAVENOUS at 06:39

## 2020-01-02 RX ADMIN — BUDESONIDE AND FORMOTEROL FUMARATE DIHYDRATE 2 PUFF(S): 160; 4.5 AEROSOL RESPIRATORY (INHALATION) at 21:56

## 2020-01-02 RX ADMIN — HEPARIN SODIUM 5000 UNIT(S): 5000 INJECTION INTRAVENOUS; SUBCUTANEOUS at 17:14

## 2020-01-02 RX ADMIN — Medication 3 MILLILITER(S): at 21:57

## 2020-01-02 RX ADMIN — Medication 40 MILLIGRAM(S): at 17:16

## 2020-01-02 RX ADMIN — FENTANYL CITRATE 1 PATCH: 50 INJECTION INTRAVENOUS at 17:19

## 2020-01-02 RX ADMIN — DULOXETINE HYDROCHLORIDE 30 MILLIGRAM(S): 30 CAPSULE, DELAYED RELEASE ORAL at 11:49

## 2020-01-02 RX ADMIN — LATANOPROST 1 DROP(S): 0.05 SOLUTION/ DROPS OPHTHALMIC; TOPICAL at 21:23

## 2020-01-02 RX ADMIN — Medication 600 MILLIGRAM(S): at 17:16

## 2020-01-02 NOTE — PROGRESS NOTE ADULT - ASSESSMENT
-asthma exacerbation, secondary to RSV patient noticed increased symptoms yesterday   -New masslike lesions/consolidation infectious/neoplastic  -Known bilateral lung nodules being followed up as an outpatient  -chronic back pain on opiates    Plan  - patient completed course of antibiotics for the suspected pneumonia   - will continue with the current dose of solumedrol today and try to taper down from tomorrow   -Continue with Symbicort and montelukast her baseline medication for the asthma   - repeat cxr to rule out any atelectasis  and incentive spirometry   - continue duonebs and mucomyst   - follow up CT in 6-8 weeks  for new masslike lesions in upper lobe and middle lobe and if persistent would consider obtaining the biopsy as an out patient

## 2020-01-02 NOTE — PROGRESS NOTE ADULT - ASSESSMENT
Pt is admitted w/  #Resp Distress, hypoxia 87%//Astma Exacerbation  - admit  - Continue iv solumedrol, will taper down tomorrow as per Pulm  - Joaquín, Symbicort, montelukast    - Mucinex po bid  - Pulm consult appreciated  - Order CXR r/o atelectasis  - incentive spirometry      #CAP, failing out pt tx// RSV pneumonitis with superimposed Pneumonia  - RVP postive  - Resp syncytial virus positive  - Urine cx negtive  - Completed Rocephin as per ID    #New masslike lesions/consolidation infectious/neoplastic  - CT -> Right lower lung nodule.  - r/o lung CA  - CT chest in 6-8 weeks to document resolution  - ID consult appreciated    #Anxiety  -continue xanax prn    #Constipation  -miralax prn daily; document BM, added MOM, No BM; add enema and lactulose; if still no BM then GI consult/CT abdo    #DVT prophylaxis  -heparin s/q    poc discussed with pt, team, Pt is admitted w/  #Resp Distress, hypoxia 87%/COPD/Asthma exacerbation - both COPD and pna are resolving and patient is in a chronic and stable state  - home oxygen at time of discharge  - taper down to po steroids but continuing iv solumedrol as per pulm recs  - Joaquín, Symbicort, montelukast    - Mucinex po bid  - Pulm consult appreciated  - Order CXR r/o atelectasis  - incentive spirometry      #CAP, failing out pt tx// RSV pneumonitis with superimposed Pneumonia  - RVP postive  - Resp syncytial virus positive  - Urine cx negtive  - Completed Rocephin as per ID    #New masslike lesions/consolidation infectious/neoplastic  - CT -> Right lower lung nodule.  - r/o lung CA  - CT chest in 6-8 weeks to document resolution  - ID consult appreciated    #Anxiety  -continue xanax prn    #Constipation  -miralax prn daily; document BM, added MOM, No BM; add enema and lactulose; if still no BM then GI consult/CT abdo    #DVT prophylaxis  -heparin s/q    dispo - home tomorrow if cleared by pulm

## 2020-01-02 NOTE — PROGRESS NOTE ADULT - SUBJECTIVE AND OBJECTIVE BOX
HPI: Pt is an 86 y/o F with a PMHx of IBS, COPD/asthma, hx of walking Pna in the past  who was  sent to the ED by MD Griffin c/o wheezing and SOB worsening over the past 3 days. Pt saw MD Griffin  and had an outpt CT done which showed apparent new lung nodule concerning for infection and pna.  Pt completed a 10 day course of Prednisone  for asthma.  Then she  notes that symptoms began the day after she finished the Prednisone last week. She reports  that when she coughs, she loses her breath and SOB is exacerbated by ambulating. Pt  did not use her home ProAir inhaler.  She is not on home O2.  Pt c/o  productive yellow cough yellow  and decreased PO intake. Pt denies CP, weakness, dizziness, fever, congestion, or orthopnea. No urinary complaints, no rash.    12/24: no new complaints  12/25: condition same  12/26: sob persists repeat cxr shows right lobar mass ? Cancer  12/27: no complaints  12/28: no complaints  12/29: c/o constipation  12/30: no BM; add MOM c/o diff bringing up phlegm, mucinex added  12/31: No BM still; add lactulose on top of MOM, Miralax tap water enema today, document BM still has difficulty bringing up phlegm  1/1: Patient seen and examined at bedside. Patient reports feeling more SOB than she felt before coming into the hospital. She reports feeling this way since this morning. She reports not being able to walk herself to the bathroom this morning due to the SOB. Patient denies chest pains, headaches, abdominal pain, nausea, vomiting, diarrhea, calf pains.  1/2: Patient seen and examined at bedside. Patient reports still feeling SOB. Patient express concerns that she felt she was doing better and now she feels worst. Patient express wanting PT becuase she has not gotten up to walk around like she used to. Patient reports no new pains. Denies chest pains, headaches, abdominal pain, nausea, vomiting, calf pains.      PHYSICAL EXAM:    Vital Signs Last 24 Hrs  T(C): 36.7 (02 Jan 2020 11:28), Max: 36.7 (02 Jan 2020 11:28)  T(F): 98.1 (02 Jan 2020 11:28), Max: 98.1 (02 Jan 2020 11:28)  HR: 82 (02 Jan 2020 14:05) (79 - 102)  BP: 126/66 (02 Jan 2020 11:28) (126/66 - 151/81)  BP(mean): --  RR: 18 (02 Jan 2020 11:28) (17 - 18)  SpO2: 96% (02 Jan 2020 11:28) (96% - 96%)      Constitutional: Weak appearing, using accessory muscles to aid breathing.   HEENT: Atraumatic, No congestion  Respiratory: labored breathing. breath sounds equal b/l. no wheeze  Cardiovascular: N S1S2; No murmur rubs gallops   Gastrointestinal: Abdomen soft, non tender, Bowel Sounds present  Extremities: No edema   Neurological: AAO x 3, no gross focal motor deficits  Skin: Non cellulitic, no rash, ulcers  Musculoskeletal: no calf pain  Breasts: Deferred  Genitourinary: deferred  Rectal: Deferred      LABS   Basic Metabolic Panel in AM (12.30.19 @ 08:06)    Sodium, Serum: 139 mmol/L    Potassium, Serum: 4.7 mmol/L    Chloride, Serum: 105 mmol/L    Carbon Dioxide, Serum: 31 mmol/L    Anion Gap, Serum: 3 mmol/L    Blood Urea Nitrogen, Serum: 28 mg/dL    Creatinine, Serum: 0.56 mg/dL    Glucose, Serum: 95 mg/dL    Calcium, Total Serum: 9.1 mg/dL    eGFR if Non : 85: Interpretative comment    Complete Blood Count in AM (12.30.19 @ 08:06)    WBC Count: 6.72 K/uL    RBC Count: 4.46 M/uL    Hemoglobin: 13.2 g/dL    Hematocrit: 41.0 %    Mean Cell Volume: 91.9 fl    Mean Cell Hemoglobin: 29.6 pg    Mean Cell Hemoglobin Conc: 32.2 gm/dL    Red Cell Distrib Width: 13.2 %    Platelet Count - Automated: 269 K/uL    Prothrombin Time and INR, Plasma in AM (12.23.19 @ 13:55)    Prothrombin Time, Plasma: 10.8: Effective October 30th, 2018 the reference range for PT has changed. sec    INR: 0.97      MICROBIOLOGY/CULTURES:  Culture Results:   <10,000 CFU/mL Normal Urogenital Karishma (12-23 @ 14:57)  Culture Results:   No growth to date. (12-23 @ 13:55)  Culture Results:   No growth to date. (12-23 @ 13:55)    RADIOLOGY  < from: Xray Chest 1 View- PORTABLE-Routine (12.26.19 @ 13:33) >  IMPRESSION:    RIGHT lower lobe opacity/mass seen on prior 12/23/2019 concerning for carcinoma.      MEDICATIONS  (STANDING):  acetylcysteine 10%  Inhalation 3 milliLiter(s) Inhalation four times a day  albuterol/ipratropium for Nebulization 3 milliLiter(s) Nebulizer every 6 hours  budesonide 160 MICROgram(s)/formoterol 4.5 MICROgram(s) Inhaler 2 Puff(s) Inhalation two times a day  DULoxetine 30 milliGRAM(s) Oral daily  fentaNYL   Patch  25 MICROgram(s)/Hr 1 Patch Transdermal every 72 hours  guaiFENesin  milliGRAM(s) Oral every 12 hours  heparin  Injectable 5000 Unit(s) SubCutaneous every 12 hours  lactulose Syrup 10 Gram(s) Oral three times a day  latanoprost 0.005% Ophthalmic Solution 1 Drop(s) Both EYES at bedtime  methylPREDNISolone sodium succinate Injectable 40 milliGRAM(s) IV Push every 12 hours  montelukast 10 milliGRAM(s) Oral daily    MEDICATIONS  (PRN):  ALPRAZolam 0.25 milliGRAM(s) Oral every 8 hours PRN anxiety  benzonatate 100 milliGRAM(s) Oral three times a day PRN Cough  gabapentin 300 milliGRAM(s) Oral daily PRN back pain  magnesium hydroxide Suspension 30 milliLiter(s) Oral daily PRN Constipation  polyethylene glycol 3350 17 Gram(s) Oral daily PRN Constipation HPI: Pt is an 84 y/o F with a PMHx of IBS, COPD/asthma, hx of walking Pna in the past  who was  sent to the ED by MD Griffin c/o wheezing and SOB worsening over the past 3 days. Pt saw MD Griffin  and had an outpt CT done which showed apparent new lung nodule concerning for infection and pna.  Pt completed a 10 day course of Prednisone  for asthma.  Then she  notes that symptoms began the day after she finished the Prednisone last week. She reports  that when she coughs, she loses her breath and SOB is exacerbated by ambulating. Pt  did not use her home ProAir inhaler.  She is not on home O2.  Pt c/o  productive yellow cough yellow  and decreased PO intake. Pt denies CP, weakness, dizziness, fever, congestion, or orthopnea. No urinary complaints, no rash.    12/24: no new complaints  12/25: condition same  12/26: sob persists repeat cxr shows right lobar mass ? Cancer  12/27: no complaints  12/28: no complaints  12/29: c/o constipation  12/30: no BM; add MOM c/o diff bringing up phlegm, mucinex added  12/31: No BM still; add lactulose on top of MOM, Miralax tap water enema today, document BM still has difficulty bringing up phlegm  1/1: Patient seen and examined at bedside. Patient reports feeling more SOB than she felt before coming into the hospital. She reports feeling this way since this morning. She reports not being able to walk herself to the bathroom this morning due to the SOB. Patient denies chest pains, headaches, abdominal pain, nausea, vomiting, diarrhea, calf pains.  1/2: Patient seen and examined at bedside. Patient reports still feeling SOB. Patient express concerns that she felt she was doing better and now she feels worse. Patient express wanting PT becuase she has not gotten up to walk around like she used to. Patient reports no new pains. Denies chest pains, headaches, abdominal pain, nausea, vomiting, calf pains.      PHYSICAL EXAM:    Vital Signs Last 24 Hrs  T(C): 36.7 (02 Jan 2020 11:28), Max: 36.7 (02 Jan 2020 11:28)  T(F): 98.1 (02 Jan 2020 11:28), Max: 98.1 (02 Jan 2020 11:28)  HR: 82 (02 Jan 2020 14:05) (79 - 102)  BP: 126/66 (02 Jan 2020 11:28) (126/66 - 151/81)  BP(mean): --  RR: 18 (02 Jan 2020 11:28) (17 - 18)  SpO2: 96% (02 Jan 2020 11:28) (96% - 96%)      Constitutional: Weak appearing, using accessory muscles to aid breathing.   HEENT: Atraumatic, No congestion  Respiratory: labored breathing. breath sounds equal b/l. no wheeze  Cardiovascular: N S1S2; No murmur rubs gallops   Gastrointestinal: Abdomen soft, non tender, Bowel Sounds present  Extremities: No edema   Neurological: AAO x 3, no gross focal motor deficits  Skin: Non cellulitic, no rash, ulcers  Musculoskeletal: no calf pain  Breasts: Deferred  Genitourinary: deferred  Rectal: Deferred    labs reviewed    RADIOLOGY  < from: Xray Chest 1 View- PORTABLE-Routine (12.26.19 @ 13:33) >  IMPRESSION:    RIGHT lower lobe opacity/mass seen on prior 12/23/2019 concerning for carcinoma.      MEDICATIONS  (STANDING):  acetylcysteine 10%  Inhalation 3 milliLiter(s) Inhalation four times a day  albuterol/ipratropium for Nebulization 3 milliLiter(s) Nebulizer every 6 hours  budesonide 160 MICROgram(s)/formoterol 4.5 MICROgram(s) Inhaler 2 Puff(s) Inhalation two times a day  DULoxetine 30 milliGRAM(s) Oral daily  fentaNYL   Patch  25 MICROgram(s)/Hr 1 Patch Transdermal every 72 hours  guaiFENesin  milliGRAM(s) Oral every 12 hours  heparin  Injectable 5000 Unit(s) SubCutaneous every 12 hours  lactulose Syrup 10 Gram(s) Oral three times a day  latanoprost 0.005% Ophthalmic Solution 1 Drop(s) Both EYES at bedtime  methylPREDNISolone sodium succinate Injectable 40 milliGRAM(s) IV Push every 12 hours  montelukast 10 milliGRAM(s) Oral daily    MEDICATIONS  (PRN):  ALPRAZolam 0.25 milliGRAM(s) Oral every 8 hours PRN anxiety  benzonatate 100 milliGRAM(s) Oral three times a day PRN Cough  gabapentin 300 milliGRAM(s) Oral daily PRN back pain  magnesium hydroxide Suspension 30 milliLiter(s) Oral daily PRN Constipation  polyethylene glycol 3350 17 Gram(s) Oral daily PRN Constipation HPI: Pt is an 84 y/o F with a PMHx of IBS, COPD/asthma, hx of walking Pna in the past  who was  sent to the ED by MD Griffin c/o wheezing and SOB worsening over the past 3 days. Pt saw MD Griffin  and had an outpt CT done which showed apparent new lung nodule concerning for infection and pna.  Pt completed a 10 day course of Prednisone  for asthma.  Then she  notes that symptoms began the day after she finished the Prednisone last week. She reports  that when she coughs, she loses her breath and SOB is exacerbated by ambulating. Pt  did not use her home ProAir inhaler.  She is not on home O2.  Pt c/o  productive yellow cough yellow  and decreased PO intake. Pt denies CP, weakness, dizziness, fever, congestion, or orthopnea. No urinary complaints, no rash.    12/24: no new complaints  12/25: condition same  12/26: sob persists repeat cxr shows right lobar mass ? Cancer  12/27: no complaints  12/28: no complaints  12/29: c/o constipation  12/30: no BM; add MOM c/o diff bringing up phlegm, mucinex added  12/31: No BM still; add lactulose on top of MOM, Miralax tap water enema today, document BM still has difficulty bringing up phlegm  1/1: Patient seen and examined at bedside. Patient reports feeling more SOB than she felt before coming into the hospital. She reports feeling this way since this morning. She reports not being able to walk herself to the bathroom this morning due to the SOB. Patient denies chest pains, headaches, abdominal pain, nausea, vomiting, diarrhea, calf pains.  1/2: Patient seen and examined at bedside. Patient reports still feeling SOB. Patient express concerns that she felt she was doing better and now she feels worse. Patient express wanting PT becuase she has not gotten up to walk around like she used to. Patient reports no new pains. Denies chest pains, headaches, abdominal pain, nausea, vomiting, calf pains.      PHYSICAL EXAM:    Vital Signs Last 24 Hrs  T(C): 36.7 (02 Jan 2020 11:28), Max: 36.7 (02 Jan 2020 11:28)  T(F): 98.1 (02 Jan 2020 11:28), Max: 98.1 (02 Jan 2020 11:28)  HR: 82 (02 Jan 2020 14:05) (79 - 102)  BP: 126/66 (02 Jan 2020 11:28) (126/66 - 151/81)  BP(mean): --  RR: 18 (02 Jan 2020 11:28) (17 - 18)  SpO2: 96% (02 Jan 2020 11:28) (96% - 96%)      Constitutional: Weak appearing, using accessory muscles to aid breathing.   HEENT: Atraumatic, No congestion  Respiratory: labored breathing. breath sounds equal b/l. no wheeze  Cardiovascular: N S1S2; No murmur rubs gallops   Gastrointestinal: Abdomen soft, non tender, Bowel Sounds present  Extremities: No edema   Neurological: AAO x 3, no gross focal motor deficits  Skin: Non cellulitic, no rash, ulcers  Musculoskeletal: no calf pain  Breasts: Deferred  Genitourinary: deferred  Rectal: Deferred    labs reviewed    RADIOLOGY  < from: Xray Chest 1 View- PORTABLE-Routine (12.26.19 @ 13:33) >  IMPRESSION:    RIGHT lower lobe opacity/mass seen on prior 12/23/2019 concerning for carcinoma.  /    MEDICATIONS  (STANDING):  acetylcysteine 10%  Inhalation 3 milliLiter(s) Inhalation four times a day  albuterol/ipratropium for Nebulization 3 milliLiter(s) Nebulizer every 6 hours  budesonide 160 MICROgram(s)/formoterol 4.5 MICROgram(s) Inhaler 2 Puff(s) Inhalation two times a day  DULoxetine 30 milliGRAM(s) Oral daily  fentaNYL   Patch  25 MICROgram(s)/Hr 1 Patch Transdermal every 72 hours  guaiFENesin  milliGRAM(s) Oral every 12 hours  heparin  Injectable 5000 Unit(s) SubCutaneous every 12 hours  lactulose Syrup 10 Gram(s) Oral three times a day  latanoprost 0.005% Ophthalmic Solution 1 Drop(s) Both EYES at bedtime  methylPREDNISolone sodium succinate Injectable 40 milliGRAM(s) IV Push every 12 hours  montelukast 10 milliGRAM(s) Oral daily    MEDICATIONS  (PRN):  ALPRAZolam 0.25 milliGRAM(s) Oral every 8 hours PRN anxiety  benzonatate 100 milliGRAM(s) Oral three times a day PRN Cough  gabapentin 300 milliGRAM(s) Oral daily PRN back pain  magnesium hydroxide Suspension 30 milliLiter(s) Oral daily PRN Constipation  polyethylene glycol 3350 17 Gram(s) Oral daily PRN Constipation

## 2020-01-02 NOTE — PROGRESS NOTE ADULT - ATTENDING COMMENTS
patient seen and examined with PA student Kristy Wagner. I was physically present for the key portions of the evaluation and management (E/M) service provided.  I agree with the above history, physical, and plan which I have reviewed and edited where appropriate.  - pulm input noted and will continue solumedrol for now and possible change to prednisone in AM  - check ambulatory o2 sat in AM prior to dc patient seen and examined with PA student Kristy Wagner. I was physically present for the key portions of the evaluation and management (E/M) service provided.  I agree with the above history, physical, and plan which I have reviewed and edited where appropriate.  - pulm input noted and will continue solumedrol for one more day prior to taper now and possible change to prednisone in AM  - f/u xray   - check ambulatory o2 sat in AM prior to dc

## 2020-01-02 NOTE — PROGRESS NOTE ADULT - SUBJECTIVE AND OBJECTIVE BOX
SUBJECTIVE     patient has episode of increased sob with the difficulty in expectoration   sob with the ambulation and producing thick mucus   no fever   dose of the solumedrol was increased yesterday in view of the sob       PAST MEDICAL & SURGICAL HISTORY:  COPD (chronic obstructive pulmonary disease)  Walking pneumonia  IBS (irritable bowel syndrome)  Chronic back pain  Asthma  Fracture of left wrist, closed, initial encounter: s/p repair x 2  Closed fracture of right femur, unspecified fracture morphology, unspecified portion of femur, initial encounter   delivery delivered: x 2  Bakers cyst  S/P cataract surgery: left  History of back surgery    OBJECTIVE   Vital Signs Last 24 Hrs  T(C): 36.4 (2020 05:50), Max: 36.6 (2020 12:08)  T(F): 97.5 (2020 05:50), Max: 97.8 (2020 12:08)  HR: 79 (2020 05:50) (79 - 106)  BP: 142/78 (2020 05:50) (111/60 - 151/81)  BP(mean): --  RR: 18 (2020 05:50) (17 - 18)  SpO2: 96% (2020 05:50) (95% - 96%)    Review of systems   as dictated in the history of present illness with the review of other systems non contributory     PHYSICAL EXAM:  Constitutional: , awake and alert, not in distress.  HEENT: Normo cephalic atraumatic  Neck: Soft and supple, No J.V.D   Respiratory: vesicular breathing ,distant breath sounds with the prolonged expiration   Cardiovascular: S1 and S2, regular rate .   Gastrointestinal:  soft, nontender,   Extremities: No  edema or calf tenderness .  Neurological: No new  focal deficits.    MEDICATIONS  (STANDING):  acetylcysteine 10%  Inhalation 3 milliLiter(s) Inhalation four times a day  albuterol/ipratropium for Nebulization 3 milliLiter(s) Nebulizer every 6 hours  budesonide 160 MICROgram(s)/formoterol 4.5 MICROgram(s) Inhaler 2 Puff(s) Inhalation two times a day  DULoxetine 30 milliGRAM(s) Oral daily  fentaNYL   Patch  25 MICROgram(s)/Hr 1 Patch Transdermal every 72 hours  guaiFENesin  milliGRAM(s) Oral every 12 hours  heparin  Injectable 5000 Unit(s) SubCutaneous every 12 hours  lactulose Syrup 10 Gram(s) Oral three times a day  latanoprost 0.005% Ophthalmic Solution 1 Drop(s) Both EYES at bedtime  methylPREDNISolone sodium succinate Injectable 40 milliGRAM(s) IV Push every 12 hours  montelukast 10 milliGRAM(s) Oral daily

## 2020-01-03 ENCOUNTER — TRANSCRIPTION ENCOUNTER (OUTPATIENT)
Age: 85
End: 2020-01-03

## 2020-01-03 VITALS
DIASTOLIC BLOOD PRESSURE: 70 MMHG | OXYGEN SATURATION: 99 % | TEMPERATURE: 97 F | HEART RATE: 110 BPM | RESPIRATION RATE: 19 BRPM | SYSTOLIC BLOOD PRESSURE: 104 MMHG

## 2020-01-03 RX ORDER — MONTELUKAST 4 MG/1
1 TABLET, CHEWABLE ORAL
Qty: 30 | Refills: 0
Start: 2020-01-03

## 2020-01-03 RX ORDER — POLYETHYLENE GLYCOL 3350 17 G/17G
17 POWDER, FOR SOLUTION ORAL
Qty: 30 | Refills: 0
Start: 2020-01-03

## 2020-01-03 RX ADMIN — FENTANYL CITRATE 1 PATCH: 50 INJECTION INTRAVENOUS at 07:44

## 2020-01-03 RX ADMIN — Medication 3 MILLILITER(S): at 09:17

## 2020-01-03 RX ADMIN — Medication 40 MILLIGRAM(S): at 11:51

## 2020-01-03 RX ADMIN — FENTANYL CITRATE 1 PATCH: 50 INJECTION INTRAVENOUS at 16:38

## 2020-01-03 RX ADMIN — Medication 600 MILLIGRAM(S): at 05:01

## 2020-01-03 RX ADMIN — Medication 10 MILLIGRAM(S): at 11:54

## 2020-01-03 RX ADMIN — MONTELUKAST 10 MILLIGRAM(S): 4 TABLET, CHEWABLE ORAL at 11:50

## 2020-01-03 RX ADMIN — Medication 40 MILLIGRAM(S): at 05:01

## 2020-01-03 RX ADMIN — HEPARIN SODIUM 5000 UNIT(S): 5000 INJECTION INTRAVENOUS; SUBCUTANEOUS at 05:01

## 2020-01-03 RX ADMIN — BUDESONIDE AND FORMOTEROL FUMARATE DIHYDRATE 2 PUFF(S): 160; 4.5 AEROSOL RESPIRATORY (INHALATION) at 09:18

## 2020-01-03 RX ADMIN — DULOXETINE HYDROCHLORIDE 30 MILLIGRAM(S): 30 CAPSULE, DELAYED RELEASE ORAL at 11:50

## 2020-01-03 NOTE — DISCHARGE NOTE PROVIDER - NSDCCPCAREPLAN_GEN_ALL_CORE_FT
PRINCIPAL DISCHARGE DIAGNOSIS  Diagnosis: Pneumonia  Assessment and Plan of Treatment: community acquired pneumonia.  RSV bronchitis/ pneumonitis.      SECONDARY DISCHARGE DIAGNOSES  Diagnosis: Lesion of lung  Assessment and Plan of Treatment: CT chest in 6-8 weeks to document resolution    Diagnosis: Hypoxia  Assessment and Plan of Treatment: home oxygen    Diagnosis: COPD (chronic obstructive pulmonary disease)  Assessment and Plan of Treatment: use your inhalers and medications as prescribed.  pulmonary follow up

## 2020-01-03 NOTE — DISCHARGE NOTE PROVIDER - NSDCMRMEDTOKEN_GEN_ALL_CORE_FT
albuterol 90 mcg/inh inhalation aerosol: 2 puff(s) inhaled 4 times a day, As Needed -for shortness of breath and/or wheezing  DULoxetine 30 mg oral delayed release capsule: 1 cap(s) orally once a day  fentaNYL 25 mcg/hr transdermal film, extended release: 1 film(s) transdermal every 72 hours  ***Last patch placed on 12/22/19***  fluticasone 50 mcg/inh nasal spray: 1 spray(s) nasal 2 times a day  gabapentin 100 mg oral capsule: 3 cap(s) orally once a day, As Needed for back pain  guaiFENesin 600 mg oral tablet, extended release: 1 tab(s) orally every 12 hours  Lumigan 0.01% ophthalmic solution: 1 drop(s) to each affected eye once a day (in the evening)  montelukast 10 mg oral tablet: 1 tab(s) orally once a day  polyethylene glycol 3350 oral powder for reconstitution: 17 gram(s) orally once a day, As needed, Constipation  predniSONE 10 mg oral tablet: take 4 tabs PO daily x 2 days, then 3 tabs PO daily x 3 days, then 2 tabs PO daily x 3 days, then 1 tab PO daily x 3 days  Symbicort 160 mcg-4.5 mcg/inh inhalation aerosol: 2 puff(s) inhaled 2 times a day

## 2020-01-03 NOTE — DISCHARGE NOTE PROVIDER - HOSPITAL COURSE
A/P:    Pt is an 84 y/o F with a PMHx of IBS, COPD/asthma, hx of walking Pna in the past  who was  sent to the ED by MD Griffin c/o wheezing and SOB worsening over the past 3 days        #Resp Distress, hypoxia 87%/COPD/Asthma exacerbation - both COPD and pna are resolving and patient is in a chronic and stable state    - home oxygen at time of discharge - Delivered    - taper down steroids, oral prednisone    - Joaquín, Symbicort, montelukast      - Mucinex po bid    - Pulm consult appreciated    - incentive spirometry        #CAP, failing out pt tx// RSV pneumonitis with superimposed Pneumonia    - RVP was positive for Resp syncytial virus - improved    - Urine cx negtive    - Completed Rocephin as per ID        #New masslike lesions/consolidation infectious/neoplastic    - CT -> Right lower lung nodule.    - r/o lung CA    - CT chest in 6-8 weeks to document resolution    - ID consult appreciated        #Anxiety    -continue xanax prn        #Constipation    - miralax    -dulc supp given and pt able to have BM and feels better        T(C): 36.3 (01-03-20 @ 11:59), Max: 36.7 (01-02-20 @ 20:56)    HR: 110 (01-03-20 @ 11:59) (75 - 110)    BP: 104/70 (01-03-20 @ 11:59) (104/70 - 140/66)    RR: 19 (01-03-20 @ 11:59) (17 - 20)    SpO2: 99% (01-03-20 @ 11:59) (96% - 99%)        Gen - Pleasant, cooperative, in no acute distress    HEENT- PERRL, moist mucus membranes, OP clear    CV - RRR, No m/r/g, +pulses, no edema    Lungs - Good effort, Clear to auscultation bilaterally    Abdomen - Soft, nontender, nondistended, +BS, No rebound/rigidity/guarding    Ext - No cyanosis/clubbing.     Skin - No rashes, No jaundice    Psych- Alert & oriented x 3    Neuro- fluent speech, no facial droop, EOMI. moves all ext        Dispo: discharge to home in stable condition        Final diagnosis, treatment plan, and follow-up recommendations were discussed and explained to the patient. The patient was given an opportunity to ask questions concerning the diagnosis and treatment plan. The patient acknowledged understanding of the diagnosis, treatment, and follow-up recommendations. The patient was advised to seek urgent care upon discharge if worsening symptoms develop prior to scheduled follow-up. Time spent on discharge included time with the patient, and also coordinating discharge care as outlined below.        Total time spent: 50 min

## 2020-01-03 NOTE — DISCHARGE NOTE PROVIDER - PROVIDER TOKENS
PROVIDER:[TOKEN:[[id number]],FOLLOWUP:[1-3 days]],FREE:[LAST:[Dr. Webster],FIRST:[L],PHONE:[(   )    -],FAX:[(   )    -],FOLLOWUP:[1-3 days]]

## 2020-01-03 NOTE — PROGRESS NOTE ADULT - REASON FOR ADMISSION
Resp Distress  Comm Aqc Pna, concern for RSV Pneumonia  Fever  COPD Exac

## 2020-01-03 NOTE — PROGRESS NOTE ADULT - PROVIDER SPECIALTY LIST ADULT
Hospitalist
Infectious Disease
Pulmonology
Hospitalist
Hospitalist

## 2020-01-03 NOTE — DISCHARGE NOTE PROVIDER - CARE PROVIDER_API CALL
Gela Griffin (MD)  Critical Care Medicine; Internal Medicine; Pulmonary Disease; Sleep Medicine  [address] [place] [address]  Phone: [phone]  Fax: [phone]  Follow Up Time: 1-3 days    FABIANO Shafer  Phone: (   )    -  Fax: (   )    -  Follow Up Time: 1-3 days

## 2020-01-03 NOTE — DISCHARGE NOTE PROVIDER - CARE PROVIDERS DIRECT ADDRESSES
[email or web address]

## 2020-01-03 NOTE — DISCHARGE NOTE PROVIDER - NSDCFUADDINST_GEN_ALL_CORE_FT
PCP- follow up in 1-3 days. Bring these papers with you to that appointment so your PCP can request records from your hospital stay.

## 2020-01-03 NOTE — PROGRESS NOTE ADULT - ASSESSMENT
-asthma exacerbation, secondary to RSV patient is feeling some what better today   -New masslike lesions/consolidation likely infection with the improvement in the right middle lobe consolidation   -Known bilateral lung nodules being followed up as an outpatient  -chronic back pain on opiates    Plan  - change of sterods to po prednisone with the tapering course with the continued use of symbicort and nebs along with the singulair as an out patient   - check the sat on the room air with ambulation at the time of the discharge for the evaluation of the home 02 therapy   - reviewed the cxr results with the patient and mentioned that right middle lobe consolidation seems to be resolved indicative of more of infection and would repeat in the few weeks for the resolution of right upper lobe nodule

## 2020-01-03 NOTE — PROGRESS NOTE ADULT - SUBJECTIVE AND OBJECTIVE BOX
SUBJECTIVE     Patient seen in the morning and says her breathing is some what better still has cough and able to bring the sputum   no fever episodes   follow up cxr noted shows resolution of the right middle lobe consolidation indicative of the infection     PAST MEDICAL & SURGICAL HISTORY:  COPD (chronic obstructive pulmonary disease)  Walking pneumonia  IBS (irritable bowel syndrome)  Chronic back pain  Asthma  Fracture of left wrist, closed, initial encounter: s/p repair x 2  Closed fracture of right femur, unspecified fracture morphology, unspecified portion of femur, initial encounter   delivery delivered: x 2  Bakers cyst  S/P cataract surgery: left  History of back surgery    OBJECTIVE   Vital Signs Last 24 Hrs  T(C): 36.3 (2020 11:59), Max: 36.7 (2020 20:56)  T(F): 97.3 (2020 11:59), Max: 98.1 (2020 20:56)  HR: 110 (2020 11:59) (75 - 110)  BP: 104/70 (2020 11:59) (104/70 - 140/66)  BP(mean): --  RR: 19 (2020 11:59) (17 - 20)  SpO2: 99% (2020 11:59) (96% - 99%)    Review of systems   as dictated in the history of present illness with the review of other systems non contributory     PHYSICAL EXAM:  Constitutional: , awake and alert, not in distress.  HEENT: Normo cephalic atraumatic  Neck: Soft and supple, No J.V.D   Respiratory: vesicular breathing has distant breath sounds with the prolonged expiration   Cardiovascular: S1 and S2, regular rate .   Gastrointestinal:  soft, nontender,   Extremities: No  edema or calf tenderness .  Neurological: No new  focal deficits.    MEDICATIONS  (STANDING):  acetylcysteine 10%  Inhalation 3 milliLiter(s) Inhalation four times a day  albuterol/ipratropium for Nebulization 3 milliLiter(s) Nebulizer every 6 hours  budesonide 160 MICROgram(s)/formoterol 4.5 MICROgram(s) Inhaler 2 Puff(s) Inhalation two times a day  DULoxetine 30 milliGRAM(s) Oral daily  fentaNYL   Patch  25 MICROgram(s)/Hr 1 Patch Transdermal every 72 hours  guaiFENesin  milliGRAM(s) Oral every 12 hours  heparin  Injectable 5000 Unit(s) SubCutaneous every 12 hours  lactulose Syrup 10 Gram(s) Oral three times a day  latanoprost 0.005% Ophthalmic Solution 1 Drop(s) Both EYES at bedtime  montelukast 10 milliGRAM(s) Oral daily  predniSONE   Tablet 40 milliGRAM(s) Oral daily

## 2020-01-08 DIAGNOSIS — Z77.22 CONTACT WITH AND (SUSPECTED) EXPOSURE TO ENVIRONMENTAL TOBACCO SMOKE (ACUTE) (CHRONIC): ICD-10-CM

## 2020-01-08 DIAGNOSIS — J44.0 CHRONIC OBSTRUCTIVE PULMONARY DISEASE WITH (ACUTE) LOWER RESPIRATORY INFECTION: ICD-10-CM

## 2020-01-08 DIAGNOSIS — J44.1 CHRONIC OBSTRUCTIVE PULMONARY DISEASE WITH (ACUTE) EXACERBATION: ICD-10-CM

## 2020-01-08 DIAGNOSIS — J12.1 RESPIRATORY SYNCYTIAL VIRUS PNEUMONIA: ICD-10-CM

## 2020-01-08 DIAGNOSIS — Z79.52 LONG TERM (CURRENT) USE OF SYSTEMIC STEROIDS: ICD-10-CM

## 2020-01-08 DIAGNOSIS — R91.1 SOLITARY PULMONARY NODULE: ICD-10-CM

## 2020-01-08 DIAGNOSIS — K58.9 IRRITABLE BOWEL SYNDROME WITHOUT DIARRHEA: ICD-10-CM

## 2020-01-08 DIAGNOSIS — Z79.891 LONG TERM (CURRENT) USE OF OPIATE ANALGESIC: ICD-10-CM

## 2020-01-08 DIAGNOSIS — Z98.42 CATARACT EXTRACTION STATUS, LEFT EYE: ICD-10-CM

## 2020-01-08 DIAGNOSIS — E43 UNSPECIFIED SEVERE PROTEIN-CALORIE MALNUTRITION: ICD-10-CM

## 2020-01-08 DIAGNOSIS — G89.29 OTHER CHRONIC PAIN: ICD-10-CM

## 2020-01-08 DIAGNOSIS — F41.9 ANXIETY DISORDER, UNSPECIFIED: ICD-10-CM

## 2020-01-08 DIAGNOSIS — J45.901 UNSPECIFIED ASTHMA WITH (ACUTE) EXACERBATION: ICD-10-CM

## 2020-01-08 DIAGNOSIS — Z79.82 LONG TERM (CURRENT) USE OF ASPIRIN: ICD-10-CM

## 2020-01-08 DIAGNOSIS — K59.00 CONSTIPATION, UNSPECIFIED: ICD-10-CM

## 2020-03-07 NOTE — H&P ADULT - GUM GEN PE MLT EXAM PC
"Chief Complaint   Patient presents with   • Ankle Injury     Patient states \"a little over a week ago\" she stumbled over a curb and hurt Bilateral ankles, no trauma noted, bilateral CMS intact, denies numbness and tingling   • Back Pain     Started after \"stumble\" she has upper and lower back pain. able to void and pass stool normally.      Patient ambulatory with steady gait to triage.  Patient was seen 03/03 patient continues to have pain around ankles and back.  Patient states Ibuprofen is not effective. Patient denies any headache, vision changes, CP, ABD pain, nor SOB.      /89   Pulse 70   Temp 35.9 °C (96.6 °F) (Temporal)   Resp 14   Ht 1.638 m (5' 4.5\")   Wt 92.6 kg (204 lb 2.3 oz)   LMP  (LMP Unknown)   SpO2 96%   BMI 34.50 kg/m²     "
detailed exam

## 2021-05-24 NOTE — ED ADULT NURSE NOTE - NS ED NURSE DC INFO COMPLEXITY
Regular solids/ thin liquids   No straws  Reduce mixed consistencies -- example- soups, cereal, etc -- >separate liquids/solids   Medication to be given in applesauce NOT with thin liquids as it is a mixed consistency   Aspiration precautions   Single sips and bites   Slow rate Simple: Patient demonstrates quick and easy understanding/Verbalized Understanding

## 2024-02-10 NOTE — H&P ADULT - ASSESSMENT
Pt Aox4, refusing bed alarm. Educated pt on fall risk and precautions, pt verbalized understanding and still refusing.                  Pt is admitted w/    Resp Distress  Comm Aqc Pna, concern for RSV Pneumonia  Fever  COPD Exac  - s/p Nebs, solumedrol, will cont  - Rocephin and Zithromax in the ED, cont  - ID consult  - DVT prophylaxis: heparin  - IMPROVE VTE Individual Risk Assessment    RISK                                                                Points    [  ] Previous VTE                                                  3    [  ] Thrombophilia                                               2    [  ] Lower limb paralysis                                      2        (unable to hold up >15 seconds)      [  ] Current Cancer                                              2         (within 6 months)    [ X ] Immobilization > 24 hrs                                1    [  ] ICU/CCU stay > 24 hours                              1    [X ] Age > 60                                                      1    IMPROVE VTE Score ______2___    IMPROVE Score 0-1: Low Risk, No VTE prophylaxis required for most patients, encourage ambulation.   IMPROVE Score 2-3: At risk, pharmacologic VTE prophylaxis is indicated for most patients (in the absence of a contraindication)  IMPROVE Score > or = 4: High Risk, pharmacologic VTE prophylaxis is indicated for most patients (in the absence of a contraindication) Pt is admitted w/    Resp Distress, hypoxia 87%  Comm Aqc Pna, failing outpt tx,  concern for RSV Pneumonia  Fever  COPD Exac  - s/p Nebs, solumedrol, will cont  - Cefepime and Vanco in the ED, cont  - review reading of outpt CT  - ID and pulm consult  - DVT prophylaxis: heparin  - IMPROVE VTE Individual Risk Assessment    RISK                                                                Points    [  ] Previous VTE                                                  3    [  ] Thrombophilia                                               2    [  ] Lower limb paralysis                                      2        (unable to hold up >15 seconds)      [  ] Current Cancer                                              2         (within 6 months)    [ X ] Immobilization > 24 hrs                                1    [  ] ICU/CCU stay > 24 hours                              1    [X ] Age > 60                                                      1    IMPROVE VTE Score ______2___    IMPROVE Score 0-1: Low Risk, No VTE prophylaxis required for most patients, encourage ambulation.   IMPROVE Score 2-3: At risk, pharmacologic VTE prophylaxis is indicated for most patients (in the absence of a contraindication)  IMPROVE Score > or = 4: High Risk, pharmacologic VTE prophylaxis is indicated for most patients (in the absence of a contraindication)

## 2024-02-12 NOTE — ED STATDOCS - DR. NAME
What Type Of Note Output Would You Prefer (Optional)?: Bullet Format
How Severe Is Your Skin Lesion?: moderate
Has Your Skin Lesion Been Treated?: not been treated
Is This A New Presentation, Or A Follow-Up?: Skin Lesion
David

## 2024-09-09 NOTE — PHYSICAL THERAPY INITIAL EVALUATION ADULT - MODALITIES TREATMENT COMMENTS
Unable to assess due to patient's cognitive impairment Patient returned to bed by request, call bell in reach and bed alarm active. Patient may ambulate per nursing in the halls, RN informed.
